# Patient Record
Sex: MALE | Race: OTHER | HISPANIC OR LATINO | URBAN - METROPOLITAN AREA
[De-identification: names, ages, dates, MRNs, and addresses within clinical notes are randomized per-mention and may not be internally consistent; named-entity substitution may affect disease eponyms.]

---

## 2019-09-30 ENCOUNTER — EMERGENCY (EMERGENCY)
Facility: HOSPITAL | Age: 49
LOS: 1 days | Discharge: HOME | End: 2019-09-30
Attending: INTERNAL MEDICINE | Admitting: EMERGENCY MEDICINE
Payer: MEDICAID

## 2019-09-30 VITALS
TEMPERATURE: 98 F | RESPIRATION RATE: 16 BRPM | HEART RATE: 67 BPM | OXYGEN SATURATION: 100 % | DIASTOLIC BLOOD PRESSURE: 94 MMHG | SYSTOLIC BLOOD PRESSURE: 159 MMHG

## 2019-09-30 LAB
ALBUMIN SERPL ELPH-MCNC: 4.6 G/DL — SIGNIFICANT CHANGE UP (ref 3.3–5)
ALP SERPL-CCNC: 97 U/L — SIGNIFICANT CHANGE UP (ref 40–120)
ALT FLD-CCNC: 32 U/L — SIGNIFICANT CHANGE UP (ref 4–41)
ANION GAP SERPL CALC-SCNC: 14 MMO/L — SIGNIFICANT CHANGE UP (ref 7–14)
APTT BLD: 29.8 SEC — SIGNIFICANT CHANGE UP (ref 27.5–36.3)
AST SERPL-CCNC: 19 U/L — SIGNIFICANT CHANGE UP (ref 4–40)
BILIRUB SERPL-MCNC: < 0.2 MG/DL — LOW (ref 0.2–1.2)
BUN SERPL-MCNC: 28 MG/DL — HIGH (ref 7–23)
CALCIUM SERPL-MCNC: 10.1 MG/DL — SIGNIFICANT CHANGE UP (ref 8.4–10.5)
CHLORIDE SERPL-SCNC: 98 MMOL/L — SIGNIFICANT CHANGE UP (ref 98–107)
CO2 SERPL-SCNC: 24 MMOL/L — SIGNIFICANT CHANGE UP (ref 22–31)
CREAT SERPL-MCNC: 0.82 MG/DL — SIGNIFICANT CHANGE UP (ref 0.5–1.3)
D DIMER BLD IA.RAPID-MCNC: 155 NG/ML — SIGNIFICANT CHANGE UP
GLUCOSE SERPL-MCNC: 354 MG/DL — HIGH (ref 70–99)
HBA1C BLD-MCNC: 12.5 % — HIGH (ref 4–5.6)
HCT VFR BLD CALC: 38.9 % — LOW (ref 39–50)
HGB BLD-MCNC: 13 G/DL — SIGNIFICANT CHANGE UP (ref 13–17)
INR BLD: 0.85 — LOW (ref 0.88–1.17)
MCHC RBC-ENTMCNC: 29.1 PG — SIGNIFICANT CHANGE UP (ref 27–34)
MCHC RBC-ENTMCNC: 33.4 % — SIGNIFICANT CHANGE UP (ref 32–36)
MCV RBC AUTO: 87 FL — SIGNIFICANT CHANGE UP (ref 80–100)
NRBC # FLD: 0 K/UL — SIGNIFICANT CHANGE UP (ref 0–0)
PLATELET # BLD AUTO: 233 K/UL — SIGNIFICANT CHANGE UP (ref 150–400)
PMV BLD: 10.4 FL — SIGNIFICANT CHANGE UP (ref 7–13)
POTASSIUM SERPL-MCNC: 4.8 MMOL/L — SIGNIFICANT CHANGE UP (ref 3.5–5.3)
POTASSIUM SERPL-SCNC: 4.8 MMOL/L — SIGNIFICANT CHANGE UP (ref 3.5–5.3)
PROT SERPL-MCNC: 7.5 G/DL — SIGNIFICANT CHANGE UP (ref 6–8.3)
PROTHROM AB SERPL-ACNC: 9.6 SEC — LOW (ref 9.8–13.1)
RBC # BLD: 4.47 M/UL — SIGNIFICANT CHANGE UP (ref 4.2–5.8)
RBC # FLD: 11.4 % — SIGNIFICANT CHANGE UP (ref 10.3–14.5)
SODIUM SERPL-SCNC: 136 MMOL/L — SIGNIFICANT CHANGE UP (ref 135–145)
TROPONIN T, HIGH SENSITIVITY: 27 NG/L — SIGNIFICANT CHANGE UP (ref ?–14)
TROPONIN T, HIGH SENSITIVITY: 27 NG/L — SIGNIFICANT CHANGE UP (ref ?–14)
WBC # BLD: 7.69 K/UL — SIGNIFICANT CHANGE UP (ref 3.8–10.5)
WBC # FLD AUTO: 7.69 K/UL — SIGNIFICANT CHANGE UP (ref 3.8–10.5)

## 2019-09-30 PROCEDURE — 93010 ELECTROCARDIOGRAM REPORT: CPT | Mod: 59

## 2019-09-30 PROCEDURE — 99218: CPT | Mod: 25

## 2019-09-30 PROCEDURE — 71046 X-RAY EXAM CHEST 2 VIEWS: CPT | Mod: 26

## 2019-09-30 RX ORDER — ASPIRIN/CALCIUM CARB/MAGNESIUM 324 MG
162 TABLET ORAL ONCE
Refills: 0 | Status: COMPLETED | OUTPATIENT
Start: 2019-09-30 | End: 2019-10-01

## 2019-09-30 RX ORDER — SODIUM CHLORIDE 9 MG/ML
1000 INJECTION INTRAMUSCULAR; INTRAVENOUS; SUBCUTANEOUS ONCE
Refills: 0 | Status: COMPLETED | OUTPATIENT
Start: 2019-09-30 | End: 2019-09-30

## 2019-09-30 RX ADMIN — SODIUM CHLORIDE 1000 MILLILITER(S): 9 INJECTION INTRAMUSCULAR; INTRAVENOUS; SUBCUTANEOUS at 23:42

## 2019-09-30 NOTE — ED CDU PROVIDER INITIAL DAY NOTE - OBJECTIVE STATEMENT
49 year old male with a PMHx of type 2 diabetes sent in by PCP for EKG changes. Pt endorses that he has had waxing/waning left sided shoulder/chest pain for ~2 days. Pain is pressure in nature, not reproducible. Denies n/v/f/c, diaphoresis, lightheadedness, SOB, abdominal pain, cough, dysuria, hematuria, melena, hematochezia, recent travel, sick contacts, travel, LE edema, calf pain, hx of tobacco use.   In the ED: 49 year old male with a PMHx of type 2 diabetes sent in by PCP (Dr. Bubba Campoverde) for EKG changes. Pt endorses that he has had waxing/waning left sided shoulder/chest pain for ~2 days. Pain is pressure in nature, not reproducible. Denies n/v/f/c, diaphoresis, lightheadedness, SOB, abdominal pain, cough, dysuria, hematuria, melena, hematochezia, recent travel, sick contacts, travel, LE edema, calf pain, hx of tobacco use.   In the ED: EKG showed 1 mm ST elevation in V1 and V2(concave-up in V2), no ST depressions, T-wave inversions in II, III, aVF, sent pt to the CDU for stress test and tele monitor.

## 2019-09-30 NOTE — ED CDU PROVIDER INITIAL DAY NOTE - MEDICAL DECISION MAKING DETAILS
49 year old male with a PMHx of type 2 diabetes sent in by PCP (Dr. Bubba Campoverde) for EKG changes.  Plan: stress test, tele monitor

## 2019-09-30 NOTE — ED CDU PROVIDER INITIAL DAY NOTE - ATTENDING CONTRIBUTION TO CARE
I Alvin Orlando MD have personally performed a face to face diagnostic evaluation on this patient.  I have reviewed the ACP note and agree with the history, exam, and plan of care, except as noted.   My medical decision making and observations are found above.  The patient is stable for CDU.    Awake, Alert, Conversant.  Resting comfortably.  Breath sounds clear in all lung fields.  Normal and regular heart rate without murmurs, rubs, or gallops.  Normal S1/S2.  Abdomen soft and nontender.  No lower extremity swelling or tenderness.  Oriented and conversant with fluent speech, moving all extremities with good strength.    Plan for serial troponins, aspirin, AM stress and cardiology consult

## 2019-09-30 NOTE — ED PROVIDER NOTE - ST/T WAVE
No ST elevations or Depressions, No T-wave inversions. 1 mm ST elevation in V1 and V2(concave-up in V2), no ST depressions, T-wave inversions in II, III, aVF

## 2019-09-30 NOTE — ED PROVIDER NOTE - CARDIAC, MLM
Normal rate, regular rhythm.  Heart sounds S1, S2.  No murmurs, rubs or gallops. Radial pulses 2+ Normal rate, regular rhythm.  Heart sounds S1, S2.  No murmurs, rubs or gallops. Radial pulses 2+ B/L

## 2019-09-30 NOTE — ED PROVIDER NOTE - CLINICAL SUMMARY MEDICAL DECISION MAKING FREE TEXT BOX
48 y/o male with history of diabetes sent in for atypical chest pain with EKG abnormalities not seen on EKG performed at triage at the ED. Currently comfortable. Will r/o ACS. Low doubt for bacterial infection such as pneumonia because there is no fever, cough, or chills. Low risk PE. Appropriate to rule out with d-dimer Doubt aortic dissection given no pain radiation to back. Pt nontoxic on exam and not hypertensive. Radial pulses 2+ on exam. 50 y/o male with history of diabetes sent in for atypical chest pain with EKG abnormalities. EKG in triage does not meet STEMI criteria.  Currently comfortable. Will r/o ACS. Low suspicion for pneumonia because there is no fever, cough, or chills. Low risk for PE and appropriate to rule out with d-dimer. Doubt aortic dissection given no pain radiation to back, Patient is nontoxic on exam and not hypertensive..

## 2019-09-30 NOTE — ED CDU PROVIDER INITIAL DAY NOTE - PROGRESS NOTE DETAILS
Emailed endocrine for consult - for hyperglycemia and A1c of 12.5. Will start pt pn weight based insulin. Emailed endocrine for consult - for hyperglycemia and A1c of 12.5. Will start pt on weight based insulin. Pt refusing insulin - discussed only giving insulin for hospital stay and discussing alternate options with endocrinologist - pt refusing insulin and would prefer to speak with endocrine tomorrow regarding alternate diabetes regimen.

## 2019-09-30 NOTE — ED ADULT NURSE REASSESSMENT NOTE - NS ED NURSE REASSESS COMMENT FT1
pt stable and comfortable denies n/v d cp sob, nsr on monitor, second trop sent to lab, will monitor

## 2019-09-30 NOTE — ED ADULT NURSE NOTE - OBJECTIVE STATEMENT
received pt A&Ox3 in no apparent distress at this time. #20g IVL to L AC, bloods drawn and sent to the lab. no s/s of infiltration noted at this time. family and MD at bedside. vss. dispo pending

## 2019-09-30 NOTE — ED PROVIDER NOTE - OBJECTIVE STATEMENT
48 y/o male with significant PMHx of type 2 diabetes presents to the ED with left sided chest discomfort described as pressure radiating to left arm constant since yesterday. No clear provoking factors. The patient was sent to the ED by his PMD due to EKG abnormalities. The EKG had 1mm ST elevation in view 1 and 2 without T-wave inversions or ST depressions. Patient denies SOB, diaphoresis, bloody or melenic stool, fevers, chills, cough, N/V/D, recent travel, sick contacts, or any other medical problems. 50 y/o male with significant PMHx of type 2 diabetes presents to the ED with left sided chest discomfort described as pressure radiating to left arm constant since yesterday. No clear provoking factors. The patient was sent to the ED by his PMD due to EKG abnormalities. The EKG had 1mm ST elevation in view 1 and 2 without T-wave inversions or ST depressions. Patient denies SOB, diaphoresis, bloody or melenic stool, fevers, chills, cough, N/V/D, recent travel, sick contacts, or any other medical problems.  No family hx of MI  Denies tobacco use 48 y/o male with significant PMHx of type 2 diabetes presents to the ED with left sided chest discomfort described as pressure radiating to left arm constant since yesterday. No clear provoking factors. The patient was sent to the ED by his PMD due to EKG abnormalities.  Patient denies SOB, diaphoresis, bloody or melenic stool, fevers, chills, cough, N/V/D, recent travel, sick contacts, or any other medical problems.  No family hx of MI  Denies tobacco use

## 2019-09-30 NOTE — ED ADULT TRIAGE NOTE - CHIEF COMPLAINT QUOTE
C/o left shoulder/arm and chest pain since yesterday. Seen at pmd today and told to come to ED for abnormal ekg. Denies sob or dizziness. H/o DM.

## 2019-10-01 VITALS
SYSTOLIC BLOOD PRESSURE: 161 MMHG | HEART RATE: 75 BPM | DIASTOLIC BLOOD PRESSURE: 74 MMHG | RESPIRATION RATE: 16 BRPM | OXYGEN SATURATION: 100 % | TEMPERATURE: 98 F

## 2019-10-01 DIAGNOSIS — I10 ESSENTIAL (PRIMARY) HYPERTENSION: ICD-10-CM

## 2019-10-01 DIAGNOSIS — E78.49 OTHER HYPERLIPIDEMIA: ICD-10-CM

## 2019-10-01 DIAGNOSIS — E11.65 TYPE 2 DIABETES MELLITUS WITH HYPERGLYCEMIA: ICD-10-CM

## 2019-10-01 PROCEDURE — 78452 HT MUSCLE IMAGE SPECT MULT: CPT | Mod: 26

## 2019-10-01 PROCEDURE — 99284 EMERGENCY DEPT VISIT MOD MDM: CPT | Mod: GC

## 2019-10-01 PROCEDURE — 93018 CV STRESS TEST I&R ONLY: CPT | Mod: GC

## 2019-10-01 PROCEDURE — 99217: CPT

## 2019-10-01 PROCEDURE — 93016 CV STRESS TEST SUPVJ ONLY: CPT | Mod: GC

## 2019-10-01 RX ORDER — SITAGLIPTIN AND METFORMIN HYDROCHLORIDE 500; 50 MG/1; MG/1
1 TABLET, FILM COATED ORAL
Qty: 28 | Refills: 0
Start: 2019-10-01 | End: 2019-10-14

## 2019-10-01 RX ORDER — SODIUM CHLORIDE 9 MG/ML
1000 INJECTION INTRAMUSCULAR; INTRAVENOUS; SUBCUTANEOUS ONCE
Refills: 0 | Status: COMPLETED | OUTPATIENT
Start: 2019-10-01 | End: 2019-10-01

## 2019-10-01 RX ORDER — GLIMEPIRIDE 1 MG
1 TABLET ORAL
Qty: 28 | Refills: 0
Start: 2019-10-01 | End: 2019-10-14

## 2019-10-01 RX ADMIN — Medication 162 MILLIGRAM(S): at 07:00

## 2019-10-01 RX ADMIN — SODIUM CHLORIDE 1000 MILLILITER(S): 9 INJECTION INTRAMUSCULAR; INTRAVENOUS; SUBCUTANEOUS at 13:28

## 2019-10-01 NOTE — ED CDU PROVIDER DISPOSITION NOTE - PATIENT PORTAL LINK FT
You can access the FollowMyHealth Patient Portal offered by [facility] by registering at the following website: [email or web address]. By joining [facility]’s FollowMyHealth portal, you will also be able to view your health information using other applications (apps) compatible with our system.

## 2019-10-01 NOTE — CONSULT NOTE ADULT - SUBJECTIVE AND OBJECTIVE BOX
HISTORY OF PRESENT ILLNESS: HPI:    49 year old male with a PMHx of type 2 diabetes sent in by PCP (Dr. Bubba Campoverde) for EKG changes. Pt c/o left shoulder intermittent pain. EKG showed 1 mm ST elevation in V1 and V2(concave-up in V2), no ST depressions, T-wave inversions in II, III, aVF. Cardiology consulted for r/o ACS, Abnormal EKG.       PAST MEDICAL & SURGICAL HISTORY:  Diabetes type 2, controlled  No significant past surgical history    MEDICATIONS:  MEDICATIONS  (STANDING):      Allergies    No Known Allergies    Intolerances        FAMILY HISTORY:    Non-contributary for premature coronary disease or sudden cardiac death    SOCIAL HISTORY:    [X ] Non-smoker  [ ] Smoker  [ ] Alcohol      REVIEW OF SYSTEMS:  [ ]chest pain  [  ]shortness of breath  [  ]palpitations  [  ]syncope  [ ]near syncope [ ]upper extremity weakness   [ ] lower extremity weakness  [  ]diplopia  [  ]altered mental status   [  ]fevers  [ ]chills [ ]nausea  [ ]vomitting  [  ]dysphagia    [ ]abdominal pain  [ ]melena  [ ]BRBPR    [  ]epistaxis  [  ]rash    [ ]lower extremity edema        [X ] All others negative	  [ ] Unable to obtain    LABS:	 	    CARDIAC MARKERS:                          13.0   7.69  )-----------( 233      ( 30 Sep 2019 16:30 )             38.9     Hb Trend: 13.0<--    09-30    136  |  98  |  28<H>  ----------------------------<  354<H>  4.8   |  24  |  0.82    Ca    10.1      30 Sep 2019 18:11    TPro  7.5  /  Alb  4.6  /  TBili  < 0.2<L>  /  DBili  x   /  AST  19  /  ALT  32  /  AlkPhos  97  09-30    Creatinine Trend: 0.82<--    Coags:  PT/INR - ( 30 Sep 2019 16:30 )   PT: 9.6 SEC;   INR: 0.85          PTT - ( 30 Sep 2019 16:30 )  PTT:29.8 SEC    proBNP:   Lipid Profile:   HgA1c: Hemoglobin A1C, Whole Blood: 12.5 % (09-30 @ 16:12)    TSH:       PHYSICAL EXAM:  T(C): 36.8 (10-01-19 @ 10:12), Max: 36.8 (09-30-19 @ 20:13)  HR: 69 (10-01-19 @ 10:12) (64 - 69)  BP: 138/76 (10-01-19 @ 10:12) (138/76 - 159/94)  RR: 16 (10-01-19 @ 10:12) (14 - 18)  SpO2: 100% (10-01-19 @ 10:12) (98% - 100%)  Wt(kg): --  I&O's Summary      Gen: Appears well in NAD  HEENT:  (-)icterus (-)pallor  CV: N S1 S2 1/6 GUI (+)2 Pulses B/l  Resp:  Clear to auscultation B/L, normal effort  GI: (+) BS Soft, NT, ND  Lymph:  (-)Edema, (-)obvious lymphadenopathy  Skin: Warm to touch, Normal turgor  Psych: Appropriate mood and affect      TELEMETRY: 	  NSR     ECG:  	  < from: 12 Lead ECG (09.30.19 @ 15:35) >  Normal sinus rhythm  Voltage criteria for left ventricular hypertrophy  Nonspecific T wave abnormality  Abnormal ECG    < end of copied text >      RADIOLOGY:         CXR:   < from: Xray Chest 2 Views PA/Lat (09.30.19 @ 17:52) >  IMPRESSION:   Clear lungs.    JAM ARROYO, RADIOLOGY RESIDENT  This document has been electronically signed.  NELA RAMIREZ M.D., ATTENDING RADIOLOGIST    < end of copied text >    ASSESSMENT/PLAN: 	    49 year old male with a PMHx of type 2 diabetes sent in by PCP (Dr. Bubba Campoverde) for EKG changes. Pt c/o left shoulder intermittent pain. EKG showed 1 mm ST elevation in V1 and V2(concave-up in V2), no ST depressions, T-wave inversions in II, III, aVF. Cardiology consulted for r/o ACS, Abnormal EKG.     -- no cp, or sob   -- trop hs 27 --> 27, indeterminant  -- pending NST results  -- if NST normal recommend oupt cardiac work up   --on d/c pt to follow with Dr. Sullivan  10/7/19 10:15 am HISTORY OF PRESENT ILLNESS: HPI:    49 year old male with a PMHx of type 2 diabetes sent in by PCP (Dr. Bubba Campoverde) for EKG changes. Pt c/o left shoulder intermittent pain. EKG showed 1 mm ST elevation in V1 and V2(concave-up in V2), no ST depressions, T-wave inversions in II, III, aVF. Cardiology consulted for r/o ACS, Abnormal EKG.  Pt. denies chest pain.  His left shoulder pain occurred at rest and was not worsened with exertion.  The patient denies any exertional symptoms in the past.       PAST MEDICAL & SURGICAL HISTORY:  Diabetes type 2, controlled  No significant past surgical history    MEDICATIONS:  MEDICATIONS  (STANDING):      Allergies    No Known Allergies    Intolerances        FAMILY HISTORY:    Non-contributary for premature coronary disease or sudden cardiac death    SOCIAL HISTORY:    [X ] Non-smoker  [ ] Smoker  [ ] Alcohol      REVIEW OF SYSTEMS:  [ ]chest pain  [  ]shortness of breath  [  ]palpitations  [  ]syncope  [ ]near syncope [ ]upper extremity weakness   [ ] lower extremity weakness  [  ]diplopia  [  ]altered mental status   [  ]fevers  [ ]chills [ ]nausea  [ ]vomitting  [  ]dysphagia    [ ]abdominal pain  [ ]melena  [ ]BRBPR    [  ]epistaxis  [  ]rash    [ ]lower extremity edema        [X ] All others negative	  [ ] Unable to obtain    LABS:	 	    CARDIAC MARKERS:                          13.0   7.69  )-----------( 233      ( 30 Sep 2019 16:30 )             38.9     Hb Trend: 13.0<--    09-30    136  |  98  |  28<H>  ----------------------------<  354<H>  4.8   |  24  |  0.82    Ca    10.1      30 Sep 2019 18:11    TPro  7.5  /  Alb  4.6  /  TBili  < 0.2<L>  /  DBili  x   /  AST  19  /  ALT  32  /  AlkPhos  97  09-30    Creatinine Trend: 0.82<--    Coags:  PT/INR - ( 30 Sep 2019 16:30 )   PT: 9.6 SEC;   INR: 0.85          PTT - ( 30 Sep 2019 16:30 )  PTT:29.8 SEC    proBNP:   Lipid Profile:   HgA1c: Hemoglobin A1C, Whole Blood: 12.5 % (09-30 @ 16:12)    TSH:       PHYSICAL EXAM:  T(C): 36.8 (10-01-19 @ 10:12), Max: 36.8 (09-30-19 @ 20:13)  HR: 69 (10-01-19 @ 10:12) (64 - 69)  BP: 138/76 (10-01-19 @ 10:12) (138/76 - 159/94)  RR: 16 (10-01-19 @ 10:12) (14 - 18)  SpO2: 100% (10-01-19 @ 10:12) (98% - 100%)  Wt(kg): --  I&O's Summary      Gen: Appears well in NAD  HEENT:  (-)icterus (-)pallor  CV: N S1 S2 1/6 GUI (+)2 Pulses B/l  Resp:  Clear to auscultation B/L, normal effort  GI: (+) BS Soft, NT, ND  Lymph:  (-)Edema, (-)obvious lymphadenopathy  Skin: Warm to touch, Normal turgor  Psych: Appropriate mood and affect      TELEMETRY: 	  NSR     ECG:  	  < from: 12 Lead ECG (09.30.19 @ 15:35) >  Normal sinus rhythm  Voltage criteria for left ventricular hypertrophy  Nonspecific T wave abnormality  Abnormal ECG    < end of copied text >      RADIOLOGY:         CXR:   < from: Xray Chest 2 Views PA/Lat (09.30.19 @ 17:52) >  IMPRESSION:   Clear lungs.    JAM ARROYO, RADIOLOGY RESIDENT  This document has been electronically signed.  NELA RAMIREZ M.D., ATTENDING RADIOLOGIST    < end of copied text >    ASSESSMENT/PLAN: 	    49 year old male with a PMHx of type 2 diabetes sent in by PCP (Dr. Bubba Campoverde) for EKG changes. Pt c/o left shoulder intermittent pain. EKG showed 1 mm ST elevation in V1 and V2(concave-up in V2), no ST depressions, T-wave inversions in II, III, aVF. Cardiology consulted for r/o ACS, Abnormal EKG.     -- no cp, or sob   -- trop hs 27 --> 27, indeterminant  -- pending NST results  -- if NST normal recommend oupt cardiac work up   --on d/c pt to follow with Dr. Sullivan  10/7/19 10:15 am

## 2019-10-01 NOTE — ED CDU PROVIDER SUBSEQUENT DAY NOTE - HISTORY
49 year old male with a PMHx of type 2 diabetes sent in by PCP (Dr. Bubba Campoverde) for EKG changes. EKG showed 1 mm ST elevation in V1 and V2(concave-up in V2), no ST depressions, T-wave inversions in II, III, aVF, sent pt to the CDU for stress test and tele monitor. 49 year old male with a PMHx of type 2 diabetes sent in by PCP (Dr. Bubba Campoverde) for EKG changes. EKG showed 1 mm ST elevation in V1 and V2(concave-up in V2), no ST depressions, T-wave inversions in II, III, aVF, sent pt to the CDU for stress test and tele monitor. Found to have A1c of 12.5 - endocrine consulted.

## 2019-10-01 NOTE — CONSULT NOTE ADULT - ASSESSMENT
Patient is a 49 year old male admitted to the hospital with left shoulder pain and EKG changes. Endocrine consulted for uncontrolled Type 2 DM with a Hemoglobin A1C of 12.5%. Patient reports following his PCP Dr. Campoverde for his DM, denies following an Endocrinologist. Patient currently on Januvia 1000 mg PO BID and currently refusing insulin inpatient. Blood glucose ranges > 200 currently.    1. Type 2 DM  - Blood glucose goal 100-180 mg/dl  - RD consult      2. Hypertension  - SBP running 138-153 in patient  - Recommend starting an ACE for renal protection    3. R/O Hyperlipidemia  - Send lipid profile Patient is a 49 year old male admitted to the hospital with left shoulder pain and EKG changes. Endocrine consulted for uncontrolled Type 2 DM with a Hemoglobin A1C of 12.5%. Patient does not follow an endocrinologist for DM management. Patient currently on Januvia at home (dose unknown) and currently refusing insulin inpatient. Blood glucose ranges > 200 currently.    1. Type 2 DM  - Blood glucose goal 100-180 mg/dl  - Recommend RD consult  - Recommend checking FS TID AC and QHS  - Recommend Lantus 12 units QHS, Humalog 3 units TID AC, Humalog low dose correctional scale TID AC, and Humalog low dose correctional scale QHS  - Patient refusing insulin, discussed at length patient risks with Hemoglobin A1C of 12.5%  - Check c-peptide with FS at same time, as well as SHEKHAR and islet cell antibodies    Outpatient plan:  - Ideally, would recommend Basal insulin (per insurance)- however patient is refusing insulin. Will then recommend to maximize oral agents  - Recommend checking FS TID at home  - If patient continues to refuse insulin, will then recommend to maximize oral agents  - Recommend keeping patient on home Januvia dosing as well as adding Glimepiride 2mg PO daily  - Follow up at office: Endocrinology at [place] @ [address], [address]      2. Hypertension  - SBP running 138-153 in patient  - Recommend starting an ACE for renal protection    3. R/O Hyperlipidemia  - Send lipid profile  - Recommend starting low dose statin Patient is a 49 year old male admitted to the hospital with left shoulder pain and EKG changes. Endocrine consulted for uncontrolled Type 2 DM with a Hemoglobin A1C of 12.5%. Patient does not follow an endocrinologist for DM management. Patient currently on Janumet at home and currently refusing insulin inpatient. Blood glucose ranges > 200 currently.    1. Type 2 DM  - Blood glucose goal 100-180 mg/dl  - Recommend RD consult  - Recommend checking FS TID AC and QHS  - Recommend Lantus 12 units QHS, Humalog 3 units TID AC, Humalog low dose correctional scale TID AC, and Humalog low dose correctional scale QHS  - Patient refusing insulin, discussed at length patient risks with Hemoglobin A1C of 12.5%  - Check c-peptide with FS at same time, as well as SHEKHAR and islet cell antibodies in AM or outpatient    Outpatient plan:  - Ideally, would recommend Basal insulin (per insurance)- however patient is refusing insulin. Will then recommend to maximize oral agents  - Recommend checking FS TID at home for goal FS .   - If patient continues to refuse insulin, will then recommend to maximize oral agents  - Recommend keeping patient on home Janumet 50/1000 mg BID and Glimepiride 2 mg BID  - Follow up at office: Endocrinology at [place] @ [address], [address]    2. Hypertension  - SBP running 138-153 in patient  - Recommend starting an ACE low dose, Lisinopril 2.5 mg QD  - Patient refusing any additional medications as he feels overwhelmed with "too many pills"    3. R/O Hyperlipidemia  - Send lipid profile  - Recommend starting low dose statin  - Patient refusing any additional medications as he feels overwhelmed with "too many pills"

## 2019-10-01 NOTE — ED CDU PROVIDER SUBSEQUENT DAY NOTE - MEDICAL DECISION MAKING DETAILS
49 year old male with a PMHx of type 2 diabetes sent in by PCP (Dr. Bubba Campoverde) for EKG changes. Plan: stress test and tele monitor. 49 year old male with a PMHx of type 2 diabetes sent in by PCP (Dr. Bubba Campoverde) for EKG changes. Plan: stress test and tele monitor. Endocrine consult for A1c of 12.5.

## 2019-10-01 NOTE — ED CDU PROVIDER SUBSEQUENT DAY NOTE - ATTENDING CONTRIBUTION TO CARE
I performed a face to face bedside interview with patient regarding history of present illness, review of symptoms and past medical history. I completed an independent physical exam.  I have discussed patient's plan of care.   I agree with note as stated above, having amended the EMR as needed to reflect my findings. I have discussed the assessment and plan of care.  This includes during the time I functioned as the attending physician for this patient.  Attending Contribution to Care: agree with plan of pa. 49 year old male with a PMHx of type 2 diabetes sent in by PCP (Dr. Bubba Campoverde) for EKG changes. EKG showed 1 mm ST elevation in V1 and V2(concave-up in V2), no ST depressions, T-wave inversions in II, III, aVF, sent pt to the CDU for stress test and tele monitor. Found to have A1c of 12.5 - endocrine consulted.  pt stress test non ischemic, pt asymptomatic with no acute complaints. eval by endocrine for elevated hga1c, vss. ekg with no sig changes. pt stable for d/c.

## 2019-10-01 NOTE — CONSULT NOTE ADULT - SUBJECTIVE AND OBJECTIVE BOX
Reason For Consult: Hemoglobin A1C 12.5%    HPI:  Patient is a 49-year-old male admitted to the hospital with left sided shoulder pain and EKG changes. Endocrine consulted for uncontrolled DMT2 with a hemoglobin A1C of 12.5%. Patient states he has been diagnosed with Type 2 DM for over twenty years. Patient states he has a TrueTrack glucometer and reports checking his blood glucose levels once daily in the morning. Per patient, average AM fasting blood glucose level is "around 200".  Patient average meals during the day for the past 5 months includes: at 8 am 4 hard boiled eggs, at 10 am a handful of cashews, at 12 pm a piece of broiled salmon, at 2 pm one pomegranate, at 4 pm a handful of almonds and at 6pm another piece of fish. Patient denies having any other snacks throughout the day,  "some times" he will make a smoothie with "green vegetables" but otherwise he states he only drinks water throughout the day. Patient denies any symptoms of polyphagia, polyuria, polydipsia, change in vision, neuropathy, weight gain/loss, heat/cold intolerances or N/V/D/C.    PAST MEDICAL & SURGICAL HISTORY:  Diabetes type 2, controlled  No significant past surgical history      FAMILY HISTORY: Denies any significant history      Social History: Denies any tobacco, alcohol or illicit drug use.    Outpatient Medications:   Januvia 1000 MG PO BID    MEDICATIONS  (STANDING): One time dose of Aspirin 162 mg PO    MEDICATIONS  (PRN): None      Allergies    No Known Allergies    Intolerances      Review of Systems:  Constitutional: No fever  Eyes: No blurry vision  HEENT: No pain  Cardiovascular: No chest pain, palpitations  Respiratory: No SOB, no cough  GI: No nausea, vomiting, abdominal pain  : No dysuria  Endocrine: no polyuria, polydipsia    ALL OTHER SYSTEMS REVIEWED AND NEGATIVE      PHYSICAL EXAM:  VITALS: T(C): 36.8 (10-01-19 @ 10:12)  T(F): 98.3 (10-01-19 @ 10:12), Max: 98.3 (09-30-19 @ 20:13)  HR: 69 (10-01-19 @ 10:12) (64 - 69)  BP: 138/76 (10-01-19 @ 10:12) (138/76 - 159/94)  RR:  (14 - 18)  SpO2:  (98% - 100%)  Wt(kg): --  GENERAL: NAD, well-groomed, well-developed  EYES: No proptosis, no lid lag, anicteric  HEENT:  Atraumatic, Normocephalic, moist mucous membranes  THYROID: Normal size, no palpable nodules  RESPIRATORY: Clear to auscultation bilaterally; No rales, rhonchi, wheezing, or rubs  CARDIOVASCULAR: Regular rate and rhythm; No murmurs; no peripheral edema  GI: Soft, nontender, non distended, normal bowel sounds  PSYCH: Alert and oriented x 3, normal affect, normal mood      POCT Blood Glucose.: 274 mg/dL (10-01-19 @ 09:35)  POCT Blood Glucose.: 227 mg/dL (09-30-19 @ 22:40)                            13.0   7.69  )-----------( 233      ( 30 Sep 2019 16:30 )             38.9       09-30    136  |  98  |  28<H>  ----------------------------<  354<H>  4.8   |  24  |  0.82    EGFR if : 122  EGFR if non : 105    Ca    10.1      09-30    TPro  7.5  /  Alb  4.6  /  TBili  < 0.2<L>  /  DBili  x   /  AST  19  /  ALT  32  /  AlkPhos  97  09-30        Hemoglobin A1C, Whole Blood: 12.5 % <H> [4.0 - 5.6] (09-30-19 @ 16:12) Reason For Consult: Hemoglobin A1C 12.5%    HPI:  Patient is a 49-year-old male admitted to the hospital with left sided shoulder pain and EKG changes. Endocrine consulted for uncontrolled DMT2 with a hemoglobin A1C of 12.5%. Patient states he has been diagnosed with Type 2 DM for over twenty years. Patient states he has a TrueTrack glucometer and reports checking his blood glucose levels once daily in the morning. Per patient, average AM fasting blood glucose level is "around 200".  Patient reports following-up with his PCP approximately 8 months ago and his Hemoglobin A1C was "around 12". Patient average meals during the day for the past 5 months includes: at 8 am 4 hard boiled eggs, at 10 am a handful of cashews, at 12 pm a piece of broiled salmon, at 2 pm one pomegranate, at 4 pm a handful of almonds and at 6pm another piece of fish. Patient denies having any other snacks throughout the day,  "some times" he will make a smoothie with "green vegetables" but otherwise he states he only drinks water throughout the day. Patient denies following an endocrinologist- his PCP manages his DM. Denies following an opthalmologist or podiatrist. Patient denies any symptoms of polyphagia, polyuria, polydipsia, change in vision, neuropathy, weight gain/loss, heat/cold intolerances or N/V/D/C.    PAST MEDICAL & SURGICAL HISTORY:  Diabetes type 2, controlled  No significant past surgical history      FAMILY HISTORY: Denies any significant history      Social History: Denies any tobacco, alcohol or illicit drug use.    Outpatient Medications:   Januvia- dose unknown    MEDICATIONS  (STANDING): One time dose of Aspirin 162 mg PO    MEDICATIONS  (PRN): None      Allergies    No Known Allergies    Intolerances      Review of Systems:  Constitutional: No fever  Eyes: No blurry vision  HEENT: No pain  Cardiovascular: No chest pain, palpitations  Respiratory: No SOB, no cough  GI: No nausea, vomiting, abdominal pain  : No dysuria  Endocrine: no polyuria, polydipsia    ALL OTHER SYSTEMS REVIEWED AND NEGATIVE      PHYSICAL EXAM:  VITALS: T(C): 36.8 (10-01-19 @ 10:12)  T(F): 98.3 (10-01-19 @ 10:12), Max: 98.3 (09-30-19 @ 20:13)  HR: 69 (10-01-19 @ 10:12) (64 - 69)  BP: 138/76 (10-01-19 @ 10:12) (138/76 - 159/94)  RR:  (14 - 18)  SpO2:  (98% - 100%)  Wt(kg): --  GENERAL: NAD, well-groomed, well-developed, thin  EYES: No proptosis  HEENT:  Atraumatic, Normocephalic, moist mucous membranes  THYROID: Normal size, no palpable nodules  RESPIRATORY: Clear to auscultation bilaterally; No rales, rhonchi, wheezing, or rubs  CARDIOVASCULAR: Regular rate and rhythm; No murmurs; no peripheral edema  GI: Soft, nontender, non distended, normal bowel sounds  PSYCH: Alert and oriented x 3, normal affect, normal mood      POCT Blood Glucose.: 274 mg/dL (10-01-19 @ 09:35)  POCT Blood Glucose.: 227 mg/dL (09-30-19 @ 22:40)                            13.0   7.69  )-----------( 233      ( 30 Sep 2019 16:30 )             38.9       09-30    136  |  98  |  28<H>  ----------------------------<  354<H>  4.8   |  24  |  0.82    EGFR if : 122  EGFR if non : 105    Ca    10.1      09-30    TPro  7.5  /  Alb  4.6  /  TBili  < 0.2<L>  /  DBili  x   /  AST  19  /  ALT  32  /  AlkPhos  97  09-30        Hemoglobin A1C, Whole Blood: 12.5 % <H> [4.0 - 5.6] (09-30-19 @ 16:12) Reason For Consult: Hemoglobin A1C 12.5%    HPI: Patient is a 49-year-old male admitted to the hospital with left sided shoulder pain and EKG changes. Endocrine consulted for uncontrolled DMT2 with a hemoglobin A1C of 12.5%. Patient states he has been diagnosed with Type 2 DM for over twenty years. Patient states he has a TrueTrack glucometer and reports checking his blood glucose levels once daily in the morning. Per patient, average AM fasting blood glucose level is "around 200".  Patient reports following-up with his PCP approximately 8 months ago and his Hemoglobin A1C was "around 12". Patient average meals during the day for the past 5 months includes: at 8 am 4 hard boiled eggs, at 10 am a handful of cashews, at 12 pm a piece of broiled salmon, at 2 pm one pomegranate, at 4 pm a handful of almonds and at 6pm another piece of fish. Patient denies having any other snacks throughout the day,  "some times" he will make a smoothie with "green vegetables" but otherwise he states he only drinks water throughout the day. Patient denies following an endocrinologist- his PCP manages his DM. Denies following an opthalmologist or podiatrist. Patient denies any symptoms of polyphagia, polyuria, polydipsia, change in vision, neuropathy, weight gain/loss, heat/cold intolerances or N/V/D/C.    Patient persistently refuses all insulin to CDU providers.    PAST MEDICAL & SURGICAL HISTORY:  Diabetes type 2, controlled  No significant past surgical history      FAMILY HISTORY: Denies any significant history of T2DM or autoimmune disease    Social History: Denies any tobacco, alcohol or illicit drug use. Admits to having kids.    Outpatient Medications:   Janumet 50/1000 mg BID as per patient, last filled in Dec 2018 as per pharmacy.    MEDICATIONS  (STANDING): One time dose of Aspirin 162 mg PO    MEDICATIONS  (PRN): None      Allergies  No Known Allergies    Review of Systems:  Constitutional: No fever  Eyes: No blurry vision  HEENT: No pain  Cardiovascular: No chest pain, palpitations  Respiratory: No SOB, no cough  GI: No nausea, vomiting, abdominal pain  : No dysuria  Endocrine: no polyuria, polydipsia    ALL OTHER SYSTEMS REVIEWED AND NEGATIVE      PHYSICAL EXAM:  VITALS: T(C): 36.8 (10-01-19 @ 10:12)  T(F): 98.3 (10-01-19 @ 10:12), Max: 98.3 (09-30-19 @ 20:13)  HR: 69 (10-01-19 @ 10:12) (64 - 69)  BP: 138/76 (10-01-19 @ 10:12) (138/76 - 159/94)  RR:  (14 - 18)  SpO2:  (98% - 100%)  Wt(kg): 70 kg    GENERAL: NAD, well-groomed, well-developed, normal weight, muscular male  EYES: No proptosis  HEENT:  Atraumatic, Normocephalic, moist mucous membranes  THYROID: Normal size, no palpable nodules  RESPIRATORY: Clear to auscultation bilaterally; No rales, rhonchi, wheezing, or rubs  CARDIOVASCULAR: Regular rate and rhythm; No murmurs; no peripheral edema  GI: Soft, nontender, non distended, normal bowel sounds  PSYCH: Alert and oriented x 3, normal affect, normal mood      POCT Blood Glucose.: 274 mg/dL (10-01-19 @ 09:35)  POCT Blood Glucose.: 227 mg/dL (09-30-19 @ 22:40)                            13.0   7.69  )-----------( 233      ( 30 Sep 2019 16:30 )             38.9       09-30    136  |  98  |  28<H>  ----------------------------<  354<H>  4.8   |  24  |  0.82    EGFR if : 122  EGFR if non : 105    Ca    10.1      09-30    TPro  7.5  /  Alb  4.6  /  TBili  < 0.2<L>  /  DBili  x   /  AST  19  /  ALT  32  /  AlkPhos  97  09-30        Hemoglobin A1C, Whole Blood: 12.5 % <H> [4.0 - 5.6] (09-30-19 @ 16:12)

## 2019-10-01 NOTE — ED CDU PROVIDER SUBSEQUENT DAY NOTE - PROGRESS NOTE DETAILS
TENZIN Griffith: pt NAD, VSS, no acute complaints. Pt resting comfortably. No events on tele. Will continue to monitor. Pending stress test. Pt has been resting comfortably. Seen by cardiology Dr. Angeles, if stress negative dc home with outpatient follow up. Stress normal study. Also seen by Endocrinology for elevated A1c. Pt has been refusing insulin in the hospital and at [facility].  understands the risks of untreated elevated sugar levels. Seen by Endocrinology who recommend melissa discharge Janumet 50/1000 BID and Glimeperide 2 mg BID. Pt to followup with PMD/Cards/ Endo outpatient. strict return precautions given, ready for dc. Pt has been resting comfortably. Seen by cardiology Dr. Angeles, if stress negative dc home with outpatient follow up. Stress normal study. Also seen by Endocrinology for elevated A1c. Pt has been refusing insulin in the hospital and at home.  understands the risks of untreated elevated sugar levels. Seen by Endocrinology who recommend for discharge Janumet 50/1000 BID and Glimeperide 2 mg BID. finger stick BID, pt has glucometer and test strips. additional labs of requested by Endo sent off ( does not have to wait fo results) left message with PA admin to call patient with results. Pt to followup with PMD/Cards/ Endo outpatient. strict return precautions given, ready for dc.

## 2019-10-01 NOTE — ED CDU PROVIDER DISPOSITION NOTE - NSFOLLOWUPINSTRUCTIONS_ED_ALL_ED_FT
Take Janumet twice day  Take Glimeperide twice a day   Check your Finger Stick twice a day as discussed  Follow up with your primary care physician in 48-72 hours- bring copies of your results.    Follow up with Endocrinology at [place] @ [address], [address] [phone]  Follow up with Cardiologist Dr. Sullivan 10/7/19 at 10:15 am.      Return to the Emergency Department for worsening or persistent symptoms OR ANY NEW OR CONCERNING SYMPTOMS. Take Janumet twice day  Take Glimeperide twice a day   Check your Finger Stick twice a day as discussed  Follow up with your primary care physician in 48-72 hours- bring copies of your results.    Follow up with Endocrinology at [place] @ [address], [address] [phone]  Follow up with Cardiologist Dr. Sullivan [phone] , on 10/7/19 at 10:15 am.      Return to the Emergency Department for worsening or persistent symptoms OR ANY NEW OR CONCERNING SYMPTOMS.

## 2019-10-01 NOTE — ED CDU PROVIDER DISPOSITION NOTE - CLINICAL COURSE
49 year old male with a PMHx of type 2 diabetes sent in by PCP (Dr. Bubba Campoverde) for EKG changes. EKG showed 1 mm ST elevation in V1 and V2(concave-up in V2), no ST depressions, T-wave inversions in II, III, aVF, sent pt to the CDU for stress test and tele monitor. Found to have A1c of 12.5 - endocrine consulted.  pt stress test non ischemic, pt asymptomatic with no acute complaints. eval by endocrine for elevated hga1c, vss. ekg with no sig changes. pt stable for d/c.

## 2019-10-01 NOTE — CONSULT NOTE ADULT - ATTENDING COMMENTS
Patient seen and examined.  Agree with above.   -Chest pain free upon evaluation  -Follow up NST  -if NST normal, no further inpatient cardiac workup needed at this time    Heavenly Angeles MD
49 year old male with severely uncontrolled T2DM (A1c 12.5%) and hyperglycemia. Also with uncontrolled HTN here with EKG changes awaiting nuclear stress test results. Recommend insulin basal/bolus inpatient but patient continues to refuse insulin inpatient. Patient already maintains a very low carb diet and is physically fit by maintaining routine exercise. 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 The low insulin reserve is currently preventing DKA or HHS but patient is at risk for this in the future given low insulin reserve. I also explained risk of microvascular and macrovascular disease burden at length and encouraged him to accept his low physiologic insulin state and accept insulin injections that are available in pens and not vials/syringes. Patient continues to decline any insulin treatment. In this case, recommend continue Janumet 50/1000 mg BID and start Glimepiride 2 mg BID. Advised patient to check FS BID for goal FS  and if goal not achieved in 1 week, recommend reconsider insulin for management of uncontrolled T2DM. For HTN and CAD risk, patient refuses Lisinopril or Statin as he states feels overwhelmed by pill burden. F/u [address] [facility]. [phone] for appointment with RD/CDE and endocrinologist. Please Rx Janumet 50/1000 mg BID and Glimepiride 2 mg BID and glucometer, test strips and lancets for FS BID.

## 2019-10-02 LAB — C PEPTIDE SERPL-MCNC: 1.7 NG/ML — SIGNIFICANT CHANGE UP (ref 1.1–4.4)

## 2019-10-04 LAB
GAD65 AB SER-MCNC: 0 NMOL/L — SIGNIFICANT CHANGE UP
ISLET CELL512 AB SER-ACNC: SIGNIFICANT CHANGE UP

## 2021-02-28 ENCOUNTER — EMERGENCY (EMERGENCY)
Facility: HOSPITAL | Age: 51
LOS: 1 days | Discharge: HOME | End: 2021-02-28
Attending: EMERGENCY MEDICINE | Admitting: EMERGENCY MEDICINE
Payer: MEDICAID

## 2021-02-28 VITALS
RESPIRATION RATE: 18 BRPM | TEMPERATURE: 96 F | OXYGEN SATURATION: 100 % | HEART RATE: 93 BPM | DIASTOLIC BLOOD PRESSURE: 99 MMHG | SYSTOLIC BLOOD PRESSURE: 144 MMHG

## 2021-02-28 LAB
ALBUMIN SERPL ELPH-MCNC: 4.6 G/DL — SIGNIFICANT CHANGE UP (ref 3.3–5)
ALP SERPL-CCNC: 119 U/L — SIGNIFICANT CHANGE UP (ref 40–120)
ALT FLD-CCNC: 11 U/L — SIGNIFICANT CHANGE UP (ref 4–41)
ANION GAP SERPL CALC-SCNC: 15 MMOL/L — HIGH (ref 7–14)
APTT BLD: 32.3 SEC — SIGNIFICANT CHANGE UP (ref 27–36.3)
AST SERPL-CCNC: 18 U/L — SIGNIFICANT CHANGE UP (ref 4–40)
B-OH-BUTYR SERPL-SCNC: 1.7 MMOL/L — HIGH (ref 0–0.4)
BASE EXCESS BLDV CALC-SCNC: 0.9 MMOL/L — SIGNIFICANT CHANGE UP (ref -3–2)
BASOPHILS # BLD AUTO: 0.09 K/UL — SIGNIFICANT CHANGE UP (ref 0–0.2)
BASOPHILS NFR BLD AUTO: 1.1 % — SIGNIFICANT CHANGE UP (ref 0–2)
BILIRUB SERPL-MCNC: 0.3 MG/DL — SIGNIFICANT CHANGE UP (ref 0.2–1.2)
BLD GP AB SCN SERPL QL: NEGATIVE — SIGNIFICANT CHANGE UP
BLOOD GAS VENOUS - CREATININE: 1 MG/DL — SIGNIFICANT CHANGE UP (ref 0.5–1.3)
BLOOD GAS VENOUS COMPREHENSIVE RESULT: SIGNIFICANT CHANGE UP
BUN SERPL-MCNC: 46 MG/DL — HIGH (ref 7–23)
CALCIUM SERPL-MCNC: 10.7 MG/DL — HIGH (ref 8.4–10.5)
CHLORIDE BLDV-SCNC: 97 MMOL/L — SIGNIFICANT CHANGE UP (ref 96–108)
CHLORIDE SERPL-SCNC: 93 MMOL/L — LOW (ref 98–107)
CO2 SERPL-SCNC: 24 MMOL/L — SIGNIFICANT CHANGE UP (ref 22–31)
CREAT SERPL-MCNC: 1.02 MG/DL — SIGNIFICANT CHANGE UP (ref 0.5–1.3)
EOSINOPHIL # BLD AUTO: 0.06 K/UL — SIGNIFICANT CHANGE UP (ref 0–0.5)
EOSINOPHIL NFR BLD AUTO: 0.7 % — SIGNIFICANT CHANGE UP (ref 0–6)
GAS PNL BLDV: 133 MMOL/L — LOW (ref 136–146)
GLUCOSE BLDV-MCNC: 327 MG/DL — HIGH (ref 70–99)
GLUCOSE SERPL-MCNC: 333 MG/DL — HIGH (ref 70–99)
HCO3 BLDV-SCNC: 23 MMOL/L — SIGNIFICANT CHANGE UP (ref 20–27)
HCT VFR BLD CALC: 40.3 % — SIGNIFICANT CHANGE UP (ref 39–50)
HCT VFR BLDA CALC: 45.3 % — SIGNIFICANT CHANGE UP (ref 39–51)
HGB BLD CALC-MCNC: 14.8 G/DL — SIGNIFICANT CHANGE UP (ref 13–17)
HGB BLD-MCNC: 13.2 G/DL — SIGNIFICANT CHANGE UP (ref 13–17)
IANC: 5.99 K/UL — SIGNIFICANT CHANGE UP (ref 1.5–8.5)
IMM GRANULOCYTES NFR BLD AUTO: 0.2 % — SIGNIFICANT CHANGE UP (ref 0–1.5)
INR BLD: 0.97 RATIO — SIGNIFICANT CHANGE UP (ref 0.88–1.16)
LACTATE BLDV-MCNC: 1.8 MMOL/L — SIGNIFICANT CHANGE UP (ref 0.5–2)
LYMPHOCYTES # BLD AUTO: 1.72 K/UL — SIGNIFICANT CHANGE UP (ref 1–3.3)
LYMPHOCYTES # BLD AUTO: 20.6 % — SIGNIFICANT CHANGE UP (ref 13–44)
MCHC RBC-ENTMCNC: 28.3 PG — SIGNIFICANT CHANGE UP (ref 27–34)
MCHC RBC-ENTMCNC: 32.8 GM/DL — SIGNIFICANT CHANGE UP (ref 32–36)
MCV RBC AUTO: 86.3 FL — SIGNIFICANT CHANGE UP (ref 80–100)
MONOCYTES # BLD AUTO: 0.46 K/UL — SIGNIFICANT CHANGE UP (ref 0–0.9)
MONOCYTES NFR BLD AUTO: 5.5 % — SIGNIFICANT CHANGE UP (ref 2–14)
NEUTROPHILS # BLD AUTO: 5.99 K/UL — SIGNIFICANT CHANGE UP (ref 1.8–7.4)
NEUTROPHILS NFR BLD AUTO: 71.9 % — SIGNIFICANT CHANGE UP (ref 43–77)
NRBC # BLD: 0 /100 WBCS — SIGNIFICANT CHANGE UP
NRBC # FLD: 0 K/UL — SIGNIFICANT CHANGE UP
PCO2 BLDV: 50 MMHG — SIGNIFICANT CHANGE UP (ref 41–51)
PH BLDV: 7.34 — SIGNIFICANT CHANGE UP (ref 7.32–7.43)
PLATELET # BLD AUTO: 448 K/UL — HIGH (ref 150–400)
PO2 BLDV: 30 MMHG — LOW (ref 35–40)
POTASSIUM BLDV-SCNC: 4.6 MMOL/L — HIGH (ref 3.4–4.5)
POTASSIUM SERPL-MCNC: 4.7 MMOL/L — SIGNIFICANT CHANGE UP (ref 3.5–5.3)
POTASSIUM SERPL-SCNC: 4.7 MMOL/L — SIGNIFICANT CHANGE UP (ref 3.5–5.3)
PROT SERPL-MCNC: 8.8 G/DL — HIGH (ref 6–8.3)
PROTHROM AB SERPL-ACNC: 11.2 SEC — SIGNIFICANT CHANGE UP (ref 10.6–13.6)
RBC # BLD: 4.67 M/UL — SIGNIFICANT CHANGE UP (ref 4.2–5.8)
RBC # FLD: 12.5 % — SIGNIFICANT CHANGE UP (ref 10.3–14.5)
RH IG SCN BLD-IMP: POSITIVE — SIGNIFICANT CHANGE UP
SAO2 % BLDV: 45.7 % — LOW (ref 60–85)
SODIUM SERPL-SCNC: 132 MMOL/L — LOW (ref 135–145)
WBC # BLD: 8.34 K/UL — SIGNIFICANT CHANGE UP (ref 3.8–10.5)
WBC # FLD AUTO: 8.34 K/UL — SIGNIFICANT CHANGE UP (ref 3.8–10.5)

## 2021-02-28 PROCEDURE — 99284 EMERGENCY DEPT VISIT MOD MDM: CPT

## 2021-02-28 PROCEDURE — 73590 X-RAY EXAM OF LOWER LEG: CPT | Mod: 26,RT

## 2021-02-28 NOTE — CONSULT NOTE ADULT - ASSESSMENT
51 year old male with diabetes presenting with 2 weeks of leg wound. Wound likely hematoma, does not appear infected.    Plan:  - Can wrap leg for decreasing swelling  - Acute treatment of hyperglycemia and follow up treatment of diabetes  - Follow up with wound care  - Discussed with Dr Chris Ramon, PGY2  [id number]

## 2021-02-28 NOTE — ED PROVIDER NOTE - CLINICAL SUMMARY MEDICAL DECISION MAKING FREE TEXT BOX
pt with large abscess to right shin despite po abx x 8 days; also noncompliant with dm meds  -cbc, cmp, vbg, beta hydroxy butyrate, xray rt shin; consider sx consult for suspicion of deeper infection to underlying muscles of rt leg

## 2021-02-28 NOTE — CONSULT NOTE ADULT - SUBJECTIVE AND OBJECTIVE BOX
SURGERY CONSULT NOTE     HPI: 51 year old man with PMH of DM (not taking medications) presenting with 2 weeks of right shin wound. He reports that 2 weeks ago he noticed the wound and he presented to [facility] where he was discharged on doxycyline, Augmentin and Mupirocin which he has been taking for 8 days. The wound has not changed significantly in that time. He does not remember any trauma to the area. The wound started draining some clear fluid and he presented to the ED. He denies fevers, chills, pain in the leg, numbness of tingling, weakness or inability to walk. He has never had a wound like this before. He has not been taking his diabetic medications due to problems with his insurance.       PMHx: Diabetes type 2, controlled    PSHx: No significant past surgical history      Medications (inpatient):   Medications (PRN):  Allergies: No Known Allergies  (Intolerances: )  Social Hx:   Family Hx:     Physical Exam  T(C): 35.3  HR: 93 (93 - 93)  BP: 144/99 (144/99 - 144/99)  RR: 18 (18 - 18)  SpO2: 100% (100% - 100%)  Tmax: T(C): , Max: 35.3 (02-28-21 @ 18:02)    General: well developed, well nourished, NAD  Neuro: alert and oriented, no focal deficits, moves all extremities spontaneously  Respiratory: airway patent, respirations unlabored  CVS: regular rate and rhythm  Abdomen: soft, nontender, nondistended  Extremities: mild edema to right shin, erythema over wound with black eschar, light drainage of serous fluid in corner, nontender to palpation, sensation and motor in tact, normal function of anterior leg muscles   Skin: warm, dry, appropriate color    Labs:                        13.2   8.34  )-----------( 448      ( 28 Feb 2021 20:32 )             40.3     PT/INR - ( 28 Feb 2021 20:32 )   PT: 11.2 sec;   INR: 0.97 ratio         PTT - ( 28 Feb 2021 20:32 )  PTT:32.3 sec  02-28    132<L>  |  93<L>  |  46<H>  ----------------------------<  333<H>  4.7   |  24  |  1.02    Ca    10.7<H>      28 Feb 2021 20:32    TPro  8.8<H>  /  Alb  4.6  /  TBili  0.3  /  DBili  x   /  AST  18  /  ALT  11  /  AlkPhos  119  02-28            Imaging and other studies:

## 2021-02-28 NOTE — ED PROVIDER NOTE - SKIN AREA #1
large fluctuant abscess to right shin, eschar over center of wound draining clear fluid, no surrounding erythema or streaking/anterior

## 2021-02-28 NOTE — ED PROVIDER NOTE - CARE PROVIDER_API CALL
Abbie Varghese)  Surgery; Vascular Surgery  [address], [address]  [place] [address]  Phone: [phone]  Fax: [phone]  Follow Up Time:

## 2021-02-28 NOTE — ED ADULT NURSE NOTE - OBJECTIVE STATEMENT
pt alert,oriented x3. denies c/o pain at present. iv access,labs sent. pt noted with redness,sweelling rle. denies fever.

## 2021-02-28 NOTE — ED PROVIDER NOTE - ATTENDING CONTRIBUTION TO CARE
I performed a face to face evaluation of this patient and obtained a history and performed a full exam.  I agree with the history, physical exam and plan of the PA.    Brief HPI:  52 y/o M PMH DM (not on meds d/t insurance) c/o wound to right shin x 2 weeks sustained in [place].  Has been on oral abx, believes wound is getting better.  No numbness, tingling, weakness.      Vitals:   Reviewed    Exam:    GEN:  Non-toxic appearing, non-distressed, speaking full sentences, non-diaphoretic, AAOx3  HEENT:  NCAT, neck supple, EOMI, PERRLA, sclera anicteric, no conjunctival pallor or injection, no stridor, normal voice, no tonsillar exudate, uvula midline, tympanic membranes and external auditory canals normal appearing bilaterally   CV:  regular rhythm and rate, s1/s2 audible, no murmurs, rubs or gallops, peripheral pulses 2+ and symmetric  PULM:  non-labored respirations, lungs clear to auscultation bilaterally, no wheezes, crackles or rales  ABD:  non distended, non-tender, no rebound, no guarding, negative Crawford's sign, bowel sounds normal, no cvat  MSK:  right anterior shin with eschar with surrounding induration, no erythema or warmth, nontender;  non-tender extremities and joints, range of motion grossly normal appearing, no extremity edema, extremities warm and well perfused   NEURO:  AAOx3, CN II-XII intact, motor 5/5 in upper and lower extremities bilaterally, sensation grossly intact in extremities and trunk, finger to nose testing wnl, no nystagmus, negative Romberg, no pronator drift, no gait deficit  SKIN:  warm, dry, no rash or vesicles     A/P:  52 y/o M PMH DM (not on meds d/t insurance) c/o wound to right shin x 2 weeks sustained in [place].  Suspect abscess vs. hematoma.  Will obtain labs, x-ray, surgery c/s.  Dispo pending.

## 2021-02-28 NOTE — ED PROVIDER NOTE - NSFOLLOWUPINSTRUCTIONS_ED_ALL_ED_FT
REST, NO STRENUOUS ACTIVITY  KEEP LEG WRAPPED FOR 48 HOURS    PLEASE FOLLOW UP WITH WOUND CARE DOCTOR AS SOON AS POSSIBLE - A TEAM MEMBER WILL REACH OUT TO HELP FACILITATE APPOINTMENT  DR FARRELL (WOUND CARE) INFORMATION ALSO PROVIDED     RETURN TO ER FOR WORSENING SYMPTOMS

## 2021-02-28 NOTE — ED PROVIDER NOTE - PATIENT PORTAL LINK FT
You can access the FollowMyHealth Patient Portal offered by [facility] by registering at the following website: [email or web address]. By joining [employer]’s FollowMyHealth portal, you will also be able to view your health information using other applications (apps) compatible with our system.

## 2021-03-01 PROBLEM — E11.9 TYPE 2 DIABETES MELLITUS WITHOUT COMPLICATIONS: Chronic | Status: ACTIVE | Noted: 2019-09-30

## 2021-03-04 ENCOUNTER — APPOINTMENT (OUTPATIENT)
Dept: WOUND CARE | Facility: CLINIC | Age: 51
End: 2021-03-04
Payer: MEDICAID

## 2021-03-04 VITALS
RESPIRATION RATE: 16 BRPM | WEIGHT: 154 LBS | HEART RATE: 89 BPM | DIASTOLIC BLOOD PRESSURE: 74 MMHG | SYSTOLIC BLOOD PRESSURE: 119 MMHG | HEIGHT: 70 IN | BODY MASS INDEX: 22.05 KG/M2 | TEMPERATURE: 95.2 F

## 2021-03-04 DIAGNOSIS — Z78.9 OTHER SPECIFIED HEALTH STATUS: ICD-10-CM

## 2021-03-04 DIAGNOSIS — L02.415 CUTANEOUS ABSCESS OF RIGHT LOWER LIMB: ICD-10-CM

## 2021-03-04 PROBLEM — Z00.00 ENCOUNTER FOR PREVENTIVE HEALTH EXAMINATION: Status: ACTIVE | Noted: 2021-03-04

## 2021-03-04 PROCEDURE — 99204 OFFICE O/P NEW MOD 45 MIN: CPT | Mod: 25

## 2021-03-04 PROCEDURE — 11042 DBRDMT SUBQ TIS 1ST 20SQCM/<: CPT

## 2021-03-04 PROCEDURE — 99072 ADDL SUPL MATRL&STAF TM PHE: CPT

## 2021-03-04 RX ORDER — AMMONIUM LACTATE 12 %
12 CREAM (GRAM) TOPICAL
Refills: 0 | Status: ACTIVE | COMMUNITY

## 2021-03-04 NOTE — PHYSICAL EXAM
[Please See PDF for [employer] Analytics] : Please See PDF for [employer] Analytics. [JVD] : no jugular venous distention  [Normal Breath Sounds] : Normal breath sounds [2+] : left 2+ [Ankle Swelling (On Exam)] : present [Abdomen Tenderness] : ~T ~M No abdominal tenderness [Skin Ulcer] : ulcer [Alert] : alert [Oriented to Person] : oriented to person [Oriented to Place] : oriented to place [Oriented to Time] : oriented to time [Calm] : calm [de-identified] : NAD, ambulatory [de-identified] : AT [de-identified] : supple [de-identified] : soft

## 2021-03-04 NOTE — PLAN
[FreeTextEntry1] :  Plan:  s/p excisional debridement of skin and subcutaneous tissue\par Apply lidocaine or topical anesthetic if needed to reduce pain upon washing the wound.\par Wash wound with ---- Dakins 0.125%\par Apply ----iodoform packing 1/2"\par Apply ----gauze, bernie, and  ACE bandage\par Change dressing ---daily\par \par Leg elevation as tolerated\par Encouraged ambulation or exercise.\par Optimization of nutrition.\par Offloading to the wound site.\par \par -----Wound supplies ordered via [facility]\par Patient given contact information to [facility]\par \par Follow up appointment scheduled for 1 week via telehealth\par \par TeleHealth Services discussed with the patient and/or family.  Discharge instructions given including download of [facility] information regarding:\par 1)  [facility] to obtain medical records\par 2)  [facility] to conduct Face-to-Face TeleHealth visit\par 3)  [employer] for the Patient (patient takes a picture of their wound which is sent to the patient's chart for review)\par \par

## 2021-03-04 NOTE — ASSESSMENT
[FreeTextEntry1] : The patient presents with a wound to the right leg.  Swelling noted to the extremity.  \par \par No clinical sign of infection

## 2021-03-04 NOTE — HISTORY OF PRESENT ILLNESS
[FreeTextEntry1] : Mr. YOVANNY REYES   presents to the office with a wound since February 20th 2021 . The wound is located on  the right leg. The patient has no complaints of pain.  The patient has been dressing the wound with mupirocin ointment.  The patient denies fevers or chills. The patient has localized pain to the wound upon dressing changes. The patient has no other complaints or associated symptoms. HbA1c is 7.\par \par \par \par Patient went to the ED on 2/20/20201 and they prescribed doxycycline, amox-clav, and mupirocin

## 2021-03-08 ENCOUNTER — NON-APPOINTMENT (OUTPATIENT)
Age: 51
End: 2021-03-08

## 2021-03-08 RX ORDER — SODIUM HYPOCHLORITE 1.25 MG/ML
0.12 SOLUTION TOPICAL
Qty: 1 | Refills: 3 | Status: ACTIVE | COMMUNITY
Start: 2021-03-08 | End: 1900-01-01

## 2021-03-17 ENCOUNTER — APPOINTMENT (OUTPATIENT)
Dept: WOUND CARE | Facility: CLINIC | Age: 51
End: 2021-03-17
Payer: MEDICAID

## 2021-03-17 VITALS
WEIGHT: 154 LBS | TEMPERATURE: 97 F | HEIGHT: 70 IN | BODY MASS INDEX: 22.05 KG/M2 | DIASTOLIC BLOOD PRESSURE: 85 MMHG | SYSTOLIC BLOOD PRESSURE: 137 MMHG | HEART RATE: 82 BPM

## 2021-03-17 PROCEDURE — 93971 EXTREMITY STUDY: CPT

## 2021-03-17 PROCEDURE — 93923 UPR/LXTR ART STDY 3+ LVLS: CPT

## 2021-03-17 PROCEDURE — 99072 ADDL SUPL MATRL&STAF TM PHE: CPT

## 2021-03-19 RX ORDER — COLLAGENASE SANTYL 250 [ARB'U]/G
250 OINTMENT TOPICAL DAILY
Qty: 1 | Refills: 3 | Status: ACTIVE | COMMUNITY
Start: 2021-03-19 | End: 1900-01-01

## 2021-03-24 ENCOUNTER — TRANSCRIPTION ENCOUNTER (OUTPATIENT)
Age: 51
End: 2021-03-24

## 2021-03-24 ENCOUNTER — APPOINTMENT (OUTPATIENT)
Dept: WOUND CARE | Facility: CLINIC | Age: 51
End: 2021-03-24
Payer: MEDICAID

## 2021-03-24 VITALS
SYSTOLIC BLOOD PRESSURE: 115 MMHG | TEMPERATURE: 97.2 F | RESPIRATION RATE: 16 BRPM | DIASTOLIC BLOOD PRESSURE: 73 MMHG | HEART RATE: 76 BPM

## 2021-03-24 PROCEDURE — 11042 DBRDMT SUBQ TIS 1ST 20SQCM/<: CPT

## 2021-03-24 PROCEDURE — 99072 ADDL SUPL MATRL&STAF TM PHE: CPT

## 2021-03-25 NOTE — PHYSICAL EXAM
[Normal Breath Sounds] : Normal breath sounds [2+] : left 2+ [Ankle Swelling (On Exam)] : present [Skin Ulcer] : ulcer [Alert] : alert [Oriented to Person] : oriented to person [Oriented to Place] : oriented to place [Oriented to Time] : oriented to time [Calm] : calm [Please See PDF for Tissue Analytics] : Please See PDF for [employer] Analytics. [JVD] : no jugular venous distention  [Abdomen Tenderness] : ~T ~M No abdominal tenderness [de-identified] : NAD, ambulatory [de-identified] : AT [de-identified] : supple [de-identified] : soft

## 2021-03-25 NOTE — PLAN
[FreeTextEntry1] : 3/24/21\par Plan - clean with vashe, pack undermining at 12 with iodoform , medihoney in center, ne, bernie, ace\par referral for derm. given\par Follow up 2 weeks

## 2021-03-25 NOTE — HISTORY OF PRESENT ILLNESS
[FreeTextEntry1] : Mr. YOVANNY REYES   presents to the office with a wound since February 20th 2021 . The wound is located on  the right leg. The patient has no complaints of pain.  The patient has been dressing the wound with santyl, gauze, bernie, and ace.  The patient denies fevers or chills. The patient has localized pain to the wound upon dressing changes. The patient has no other complaints or associated symptoms. HbA1c is 7.\par \par Patient went to the ED on 2/20/20201 and they prescribed doxycycline, amox-clav, and mupirocin

## 2021-03-25 NOTE — ASSESSMENT
[FreeTextEntry1] : 3/24/21\par right leg wound smaller, debrided, only 1.5 cm undermining at 12 o'clock noted

## 2021-03-26 LAB
BACTERIA SPEC CULT: ABNORMAL
CORE LAB BIOPSY: NORMAL

## 2021-04-01 ENCOUNTER — APPOINTMENT (OUTPATIENT)
Dept: DERMATOLOGY | Facility: CLINIC | Age: 51
End: 2021-04-01
Payer: MEDICAID

## 2021-04-01 VITALS — BODY MASS INDEX: 21.52 KG/M2 | WEIGHT: 150 LBS

## 2021-04-01 DIAGNOSIS — R21 RASH AND OTHER NONSPECIFIC SKIN ERUPTION: ICD-10-CM

## 2021-04-01 PROCEDURE — 99072 ADDL SUPL MATRL&STAF TM PHE: CPT

## 2021-04-01 PROCEDURE — 99203 OFFICE O/P NEW LOW 30 MIN: CPT

## 2021-04-02 ENCOUNTER — TRANSCRIPTION ENCOUNTER (OUTPATIENT)
Age: 51
End: 2021-04-02

## 2021-04-07 ENCOUNTER — APPOINTMENT (OUTPATIENT)
Dept: WOUND CARE | Facility: CLINIC | Age: 51
End: 2021-04-07
Payer: MEDICAID

## 2021-04-07 VITALS
WEIGHT: 154 LBS | SYSTOLIC BLOOD PRESSURE: 122 MMHG | HEART RATE: 76 BPM | BODY MASS INDEX: 22.1 KG/M2 | TEMPERATURE: 97 F | RESPIRATION RATE: 16 BRPM | DIASTOLIC BLOOD PRESSURE: 76 MMHG

## 2021-04-07 PROCEDURE — 99213 OFFICE O/P EST LOW 20 MIN: CPT

## 2021-04-07 NOTE — PHYSICAL EXAM
[Normal Breath Sounds] : Normal breath sounds [2+] : left 2+ [Ankle Swelling (On Exam)] : present [Skin Ulcer] : ulcer [Alert] : alert [Oriented to Person] : oriented to person [Oriented to Place] : oriented to place [Oriented to Time] : oriented to time [Calm] : calm [Please See PDF for Tissue Analytics] : Please See PDF for [employer] Analytics. [JVD] : no jugular venous distention  [Abdomen Tenderness] : ~T ~M No abdominal tenderness [de-identified] : NAD, ambulatory [de-identified] : AT [de-identified] : supple [de-identified] : soft

## 2021-04-07 NOTE — ASSESSMENT
[FreeTextEntry1] : \par 4/7/21\par Plan - clean with vashe,  undermining at 12 closed   ,  santyl dry  dressing \par after showering .\par \par Follow up 2 weeks in office tele health 1 week \par plan \par wd care yvette  3 x a week \par wrap with kurlex wrap with ace \par inst to homecare rn

## 2021-04-07 NOTE — PLAN
[FreeTextEntry1] : \par 4/7/21\par plan \par wd care yvette  3 x a week \par wrap with kurlex wrap with ace \par inst to homecare rn

## 2021-04-20 ENCOUNTER — NON-APPOINTMENT (OUTPATIENT)
Age: 51
End: 2021-04-20

## 2021-04-21 ENCOUNTER — APPOINTMENT (OUTPATIENT)
Dept: WOUND CARE | Facility: CLINIC | Age: 51
End: 2021-04-21
Payer: MEDICAID

## 2021-04-21 VITALS
TEMPERATURE: 97.2 F | HEART RATE: 72 BPM | RESPIRATION RATE: 18 BRPM | SYSTOLIC BLOOD PRESSURE: 121 MMHG | DIASTOLIC BLOOD PRESSURE: 75 MMHG

## 2021-04-21 PROCEDURE — 99072 ADDL SUPL MATRL&STAF TM PHE: CPT

## 2021-04-21 PROCEDURE — 15271 SKIN SUB GRAFT TRNK/ARM/LEG: CPT

## 2021-04-22 NOTE — PLAN
[FreeTextEntry1] : 4/21/21\par Plan - oasis placed, wound veil over, pt. does own wound care\par follow up next week

## 2021-04-22 NOTE — ASSESSMENT
[FreeTextEntry1] : 4/21/21\par right leg wound \par \par oasis number 1 applied today to patient’s (Right anterior leg).   Wound bed debrided of bioburden and prepped for product application.  1 of pieces and original size of product) obtained.  Total product usage was 4.1sq. cm), Total waste 6.4 sq. cm) due to wound size.  Product secured with (steri strips and bolster dressing).\par Patient to f/u in 1 week.\par \par \par

## 2021-04-22 NOTE — PHYSICAL EXAM
[Normal Breath Sounds] : Normal breath sounds [2+] : left 2+ [Ankle Swelling (On Exam)] : present [Skin Ulcer] : ulcer [Alert] : alert [Oriented to Person] : oriented to person [Oriented to Place] : oriented to place [Oriented to Time] : oriented to time [Calm] : calm [Please See PDF for Tissue Analytics] : Please See PDF for [employer] Analytics. [JVD] : no jugular venous distention  [Abdomen Tenderness] : ~T ~M No abdominal tenderness [de-identified] : NAD, ambulatory [de-identified] : AT [de-identified] : supple [de-identified] : soft

## 2021-04-27 ENCOUNTER — NON-APPOINTMENT (OUTPATIENT)
Age: 51
End: 2021-04-27

## 2021-04-28 ENCOUNTER — APPOINTMENT (OUTPATIENT)
Dept: WOUND CARE | Facility: CLINIC | Age: 51
End: 2021-04-28
Payer: MEDICAID

## 2021-04-28 VITALS
SYSTOLIC BLOOD PRESSURE: 127 MMHG | BODY MASS INDEX: 22.05 KG/M2 | HEIGHT: 70 IN | DIASTOLIC BLOOD PRESSURE: 78 MMHG | TEMPERATURE: 97.2 F | WEIGHT: 154 LBS

## 2021-04-28 PROCEDURE — 99072 ADDL SUPL MATRL&STAF TM PHE: CPT

## 2021-04-28 PROCEDURE — 11042 DBRDMT SUBQ TIS 1ST 20SQCM/<: CPT

## 2021-04-28 NOTE — PHYSICAL EXAM
[Normal Breath Sounds] : Normal breath sounds [2+] : left 2+ [Ankle Swelling (On Exam)] : present [Skin Ulcer] : ulcer [Alert] : alert [Oriented to Person] : oriented to person [Oriented to Place] : oriented to place [Oriented to Time] : oriented to time [Calm] : calm [Please See PDF for Tissue Analytics] : Please See PDF for [employer] Analytics. [JVD] : no jugular venous distention  [Abdomen Tenderness] : ~T ~M No abdominal tenderness [de-identified] : NAD, ambulatory [de-identified] : AT [de-identified] : supple [de-identified] : soft

## 2021-04-28 NOTE — PLAN
[FreeTextEntry1] : 4/21/21\par Plan - oasis placed, wound veil over, pt. does own wound care\par follow up next week\par \par 4/28/21\par s/p excisional debridement done today\par wound improved\par no clinical sign of infeciton\par f/u 2 weeks telehealth

## 2021-05-11 ENCOUNTER — NON-APPOINTMENT (OUTPATIENT)
Age: 51
End: 2021-05-11

## 2021-05-12 ENCOUNTER — APPOINTMENT (OUTPATIENT)
Dept: WOUND CARE | Facility: CLINIC | Age: 51
End: 2021-05-12
Payer: MEDICAID

## 2021-05-12 PROCEDURE — 0598T R-T FLUOR WOUND IMG 1ST SITE: CPT

## 2021-05-12 PROCEDURE — 99213 OFFICE O/P EST LOW 20 MIN: CPT

## 2021-05-12 PROCEDURE — 99072 ADDL SUPL MATRL&STAF TM PHE: CPT

## 2021-05-12 NOTE — PLAN
[FreeTextEntry1] : 4/21/21\par Plan - oasis placed, wound veil over, pt. does own wound care\par follow up next week\par \par 4/28/21\par s/p excisional debridement done today\par wound improved\par no clinical sign of infeciton\par f/u 2 weeks telehealth\par \par 5/12/21\par Wound improved\par No clinical sign of infection\par f/u telehealth\par continue collagen

## 2021-05-12 NOTE — PHYSICAL EXAM
[Normal Breath Sounds] : Normal breath sounds [2+] : left 2+ [Ankle Swelling (On Exam)] : present [Skin Ulcer] : ulcer [Alert] : alert [Oriented to Person] : oriented to person [Oriented to Place] : oriented to place [Oriented to Time] : oriented to time [Calm] : calm [Please See PDF for Tissue Analytics] : Please See PDF for [employer] Analytics. [JVD] : no jugular venous distention  [Abdomen Tenderness] : ~T ~M No abdominal tenderness [de-identified] : NAD, ambulatory [de-identified] : AT [de-identified] : supple [de-identified] : soft

## 2021-06-07 ENCOUNTER — APPOINTMENT (OUTPATIENT)
Dept: DERMATOLOGY | Facility: CLINIC | Age: 51
End: 2021-06-07

## 2021-06-11 ENCOUNTER — NON-APPOINTMENT (OUTPATIENT)
Age: 51
End: 2021-06-11

## 2021-06-14 ENCOUNTER — APPOINTMENT (OUTPATIENT)
Dept: WOUND CARE | Facility: CLINIC | Age: 51
End: 2021-06-14
Payer: MEDICAID

## 2021-06-14 VITALS
DIASTOLIC BLOOD PRESSURE: 78 MMHG | HEART RATE: 7 BPM | TEMPERATURE: 98.1 F | RESPIRATION RATE: 18 BRPM | SYSTOLIC BLOOD PRESSURE: 138 MMHG

## 2021-06-14 PROCEDURE — 99213 OFFICE O/P EST LOW 20 MIN: CPT

## 2021-06-14 NOTE — PLAN
[FreeTextEntry1] : Wound shown significant improvement\par No clinical sign of infection\par \par continue collagen\par f/u telehealth

## 2021-06-14 NOTE — PHYSICAL EXAM
[Normal Breath Sounds] : Normal breath sounds [2+] : left 2+ [Ankle Swelling (On Exam)] : present [Skin Ulcer] : ulcer [Alert] : alert [Oriented to Person] : oriented to person [Oriented to Place] : oriented to place [Oriented to Time] : oriented to time [Calm] : calm [Please See PDF for Tissue Analytics] : Please See PDF for [employer] Analytics. [JVD] : no jugular venous distention  [Abdomen Tenderness] : ~T ~M No abdominal tenderness [de-identified] : NAD, ambulatory [de-identified] : AT [de-identified] : supple [de-identified] : soft

## 2021-06-28 ENCOUNTER — APPOINTMENT (OUTPATIENT)
Dept: WOUND CARE | Facility: CLINIC | Age: 51
End: 2021-06-28
Payer: MEDICAID

## 2021-06-28 PROCEDURE — 99442: CPT

## 2021-06-28 NOTE — HISTORY OF PRESENT ILLNESS
[Home] : at home, [unfilled] , at the time of the visit. [Medical Office: ([facility])___] : at the medical office located in  [Verbal consent obtained from patient] : the patient, [unfilled] [FreeTextEntry1] : Mr. YOVANNY REYES   presents to the office with a wound since February 20th 2021 . The wound is located on  the right leg. The patient has no complaints of pain.  The patient has been dressing the wound with santyl, gauze, bernie, and ace.  The patient denies fevers or chills. The patient has localized pain to the wound upon dressing changes. The patient has no other complaints or associated symptoms. HbA1c is 7.\par \par Patient went to the ED on 2/20/20201 and they prescribed doxycycline, amox-clav, and mupirocin\par \par 6/28/21 Chart reviewed\par  patient reports that he does not want to do Telehealth visit , but will rather make a f/u ov\par He reports that all is "better"\par he does not have a VN\par He is using collagen on wound\par he denies fever and reports that his blood glucose is controlled

## 2021-08-06 ENCOUNTER — APPOINTMENT (OUTPATIENT)
Dept: WOUND CARE | Facility: CLINIC | Age: 51
End: 2021-08-06
Payer: MEDICAID

## 2021-08-06 VITALS
HEIGHT: 70 IN | HEART RATE: 80 BPM | SYSTOLIC BLOOD PRESSURE: 127 MMHG | TEMPERATURE: 97.4 F | WEIGHT: 154 LBS | DIASTOLIC BLOOD PRESSURE: 80 MMHG | BODY MASS INDEX: 22.05 KG/M2 | RESPIRATION RATE: 16 BRPM

## 2021-08-06 DIAGNOSIS — S81.802D UNSPECIFIED OPEN WOUND, LEFT LOWER LEG, SUBSEQUENT ENCOUNTER: ICD-10-CM

## 2021-08-06 DIAGNOSIS — E11.9 TYPE 2 DIABETES MELLITUS W/OUT COMPLICATIONS: ICD-10-CM

## 2021-08-06 PROCEDURE — 99213 OFFICE O/P EST LOW 20 MIN: CPT

## 2021-08-06 RX ORDER — SULFAMETHOXAZOLE AND TRIMETHOPRIM 800; 160 MG/1; MG/1
800-160 TABLET ORAL TWICE DAILY
Qty: 14 | Refills: 0 | Status: DISCONTINUED | COMMUNITY
Start: 2021-03-08 | End: 2021-08-06

## 2021-08-06 NOTE — ASSESSMENT
[FreeTextEntry1] : 8/6/21\par right leg wound \par \par Wound look smaller\par Continue with yvette, gauze, hypofix\par

## 2021-08-06 NOTE — PHYSICAL EXAM
[Normal Breath Sounds] : Normal breath sounds [2+] : left 2+ [Ankle Swelling (On Exam)] : present [Skin Ulcer] : ulcer [Alert] : alert [Oriented to Person] : oriented to person [Oriented to Place] : oriented to place [Oriented to Time] : oriented to time [Calm] : calm [Please See PDF for Tissue Analytics] : Please See PDF for [employer] Analytics. [JVD] : no jugular venous distention  [Abdomen Tenderness] : ~T ~M No abdominal tenderness [de-identified] : NAD, ambulatory [de-identified] : AT [de-identified] : supple [de-identified] : soft

## 2021-08-19 ENCOUNTER — APPOINTMENT (OUTPATIENT)
Dept: WOUND CARE | Facility: CLINIC | Age: 51
End: 2021-08-19

## 2022-06-27 ENCOUNTER — APPOINTMENT (OUTPATIENT)
Dept: WOUND CARE | Facility: CLINIC | Age: 52
End: 2022-06-27

## 2023-11-16 ENCOUNTER — APPOINTMENT (OUTPATIENT)
Dept: WOUND CARE | Facility: CLINIC | Age: 53
End: 2023-11-16
Payer: MEDICAID

## 2023-11-16 DIAGNOSIS — L98.499 NON-PRESSURE CHRONIC ULCER OF SKIN OF OTHER SITES WITH UNSPECIFIED SEVERITY: ICD-10-CM

## 2023-11-16 DIAGNOSIS — S81.802A UNSPECIFIED OPEN WOUND, RIGHT LOWER LEG, INITIAL ENCOUNTER: ICD-10-CM

## 2023-11-16 DIAGNOSIS — S91.309A UNSPECIFIED OPEN WOUND, UNSPECIFIED FOOT, INITIAL ENCOUNTER: ICD-10-CM

## 2023-11-16 DIAGNOSIS — S81.801A UNSPECIFIED OPEN WOUND, RIGHT LOWER LEG, INITIAL ENCOUNTER: ICD-10-CM

## 2023-11-16 PROCEDURE — 99204 OFFICE O/P NEW MOD 45 MIN: CPT

## 2023-11-16 RX ORDER — MUPIROCIN 20 MG/G
2 OINTMENT TOPICAL
Qty: 1 | Refills: 2 | Status: ACTIVE | COMMUNITY
Start: 2023-11-16 | End: 1900-01-01

## 2024-05-23 NOTE — REASON FOR VISIT
[Follow-Up: _____] : a [unfilled] follow-up visit [FreeTextEntry1] : Right Anterior leg Xray Abdomen 2 Views

## 2024-12-13 ENCOUNTER — INPATIENT (INPATIENT)
Facility: HOSPITAL | Age: 54
LOS: 5 days | Discharge: HOME HEALTH | End: 2024-12-19
Attending: STUDENT IN AN ORGANIZED HEALTH CARE EDUCATION/TRAINING PROGRAM | Admitting: STUDENT IN AN ORGANIZED HEALTH CARE EDUCATION/TRAINING PROGRAM
Payer: MEDICAID

## 2024-12-13 VITALS
DIASTOLIC BLOOD PRESSURE: 79 MMHG | WEIGHT: 153 LBS | RESPIRATION RATE: 18 BRPM | SYSTOLIC BLOOD PRESSURE: 156 MMHG | HEIGHT: 70 IN | OXYGEN SATURATION: 99 % | TEMPERATURE: 100 F | HEART RATE: 74 BPM

## 2024-12-13 PROCEDURE — 99285 EMERGENCY DEPT VISIT HI MDM: CPT

## 2024-12-13 RX ORDER — FAMOTIDINE 20 MG/1
20 TABLET, FILM COATED ORAL ONCE
Refills: 0 | Status: COMPLETED | OUTPATIENT
Start: 2024-12-13 | End: 2024-12-13

## 2024-12-13 RX ORDER — 0.9 % SODIUM CHLORIDE 0.9 %
1000 INTRAVENOUS SOLUTION INTRAVENOUS ONCE
Refills: 0 | Status: COMPLETED | OUTPATIENT
Start: 2024-12-13 | End: 2024-12-13

## 2024-12-13 RX ORDER — SIMETHICONE 125 MG
80 CAPSULE ORAL ONCE
Refills: 0 | Status: COMPLETED | OUTPATIENT
Start: 2024-12-13 | End: 2024-12-13

## 2024-12-13 NOTE — ED ADULT TRIAGE NOTE - ISOLATION TYPE:
NICU Progress Note    This is a  male infant born 2017 12:01 AM at Gestational Age: 33w6d now at age: 36 hours old  There are no active problems to display for this patient.    Subjective: Working on po. Remains on RA. Started on abx due to maternal GBS positive.     Objective:  Vitals Last Value 24-Hour Range   Temperature 98 °F (36.7 °C) (10/15/19 0830) Temp  Min: 98 °F (36.7 °C)  Max: 99 °F (37.2 °C)   Pulse 118 (10/15/19 1130) Pulse  Min: 108  Max: 166   Respiratory 40 (10/15/19 1130) Resp  Min: 33  Max: 64   Non-Invasive Blood Pressure 65/32 (10/15/19 0830) BP  Min: 47/24  Max: 65/32   Arterial Blood Pressure   No data recorded   Mean Arterial Pressure 43 (10/15/19 0830) MAP (mmHg)  Min: 31  Max: 43   Pulse Oximetry 99 % (10/15/19 1130) SpO2  Min: 92 %  Max: 100 %     Weight over the past 48 hours:    Patient Vitals for the past 48 hrs:   Weight   10/14/19 0001 (!) 2126 g   10/14/19 0033 (!) 2126 g   10/15/19 0230 (!) 2220 g     RESP:  RA  Last Apnea:    and intervention:      Physical Exam:  Birthweight:  4 lb 11 oz (2126 g) with Weight change: 94 g 4% since birth  Sleeping in open warmer, no distress  Skin:  Pink, well perfused, no edema.    Anterior [place]:  Soft, flat. NG in place.   Normal facies, no anomalies/bruising noted.  Eyes:  Open without drainage  Lungs:  Clear to auscultation, no retractions  CV:  RRR with no murmur  Abdomen:  Soft, no masses, nondistended   male.   Normal tone, movement, juanis, suck    Fluids:  Date 10/14/19 0700 - 10/15/19 0659 10/15/19 0700 - 10/16/19 0659   Shift 7000-2719 2141-8172 8386-5787 24 Hour Total 4289-9326 9602-9937 8362-2431 24 Hour Total   INTAKE   P.O.(mL/kg)   0.1(0.05) 0.1(0.05)       I.V.(mL/kg) 41.9(19.71) 62.89(29.58) 64.83(29.2) 169.62(76.4) 21.25(9.57)   21.25(9.57)   NG/GT(mL/kg) 15(7.06) 10(4.7) 15(6.76) 40(18.02) 5(2.25)   5(2.25)   Shift Total(mL/kg) 56.9(26.76) 72.89(34.29) 79.93(36) 209.72(94.47) 26.25(11.82)   26.25(11.82)  None   OUTPUT   Urine(mL/kg/hr) 15(0.88) 81(4.76) 84(4.73) 180(3.38) 23   23   Shift Total(mL/kg) 15(7.06) 81(38.1) 84(37.84) 180(81.08) 23(10.36)   23(10.36)   Weight (kg) 2.13 2.13 2.22 2.22 2.22 2.22 2.22 2.22     Intake/Output       10/14 0700 - 10/15 0659 10/15 0700 - 10/16 0659    P.O. (mL/kg) 0.1 (0.05)     I.V. (mL/kg) 169.62 (76.41) 21.25 (9.57)    NG/GT (mL/kg) 40 (18.02) 5 (2.25)    Total Intake(mL/kg) 209.72 (94.47) 26.25 (11.82)    Urine (mL/kg/hr) 180 (3.38) 23 (2.17)    Stool 0     Total Output(mL/kg) 180 (81.08) 23 (10.36)    Net +29.72 +3.25          Urine Occurrence 2 x     Stool Occurrence 1 x     Unmeasured Breast Milk Occurrence 5 x           Source Rate Total (on BirthWeight: (!) 2126 g(Filed from Delivery Summary)   PIV 7 mL/hr 76 mL/kg/d   Feeds -   5 mL q3hr 18 mL/kg/d   Total Fluids   94 mL/kg/d   Urine   3.4 mL/kg/hr      Last Void:  1 (10/15/19 0530)  Last Stool:  1 (10/15/19 0230)x1    Labs:    Recent Results (from the past 24 hour(s))   Metered blood glucose    Collection Time: 10/14/19  8:40 PM   Result Value Ref Range    Glucose Bedside POC 77 36 - 110 mg/dL   Chem 8 Panel - Point of Care    Collection Time: 10/15/19  5:51 AM   Result Value Ref Range    Sodium  135 - 145 mmol/L    Potassium POC 5.7 3.5 - 6.0 mmol/L    Chloride  97 - 110 mmol/L    CALCIUM IONIZED-POC 1.17 1.15 - 1.29 mmol/L    CO2 Total 20 19 - 24 mmol/L    GLUCOSE POC 75 47 - 110 mg/dL    BUN POC 7 5 - 19 mg/dL    HEMATOCRIT POC 56.0 45.0 - 67.0 %    Hemoglobin POC 19.0 14.5 - 22.5 g/dL    ANION GAP POC 19 mmol/L    Creatinine POC 0.90 0.33 - 0.97 mg/dL    Estimated GFR  (POC) Not calculated.     Estimated GFR Non- (POC) Not calculated.    Bilirubin, Total    Collection Time: 10/15/19  6:00 AM   Result Value Ref Range    TOTAL BILIRUBIN 6.6 (H) 2.0 - 6.0 mg/dL   Bilirubin, Direct    Collection Time: 10/15/19  6:00 AM   Result Value Ref Range    DIRECT BILIRUBIN 0.1 0.0 - 0.6  mg/dL       Medications:  Current Facility-Administered Medications   Medication Dose Route Frequency Provider Last Rate Last Dose   • hepatitis B (ENGERIX-B) 10 MCG/0.5ML vaccine 10 mcg  0.5 mL Intramuscular Once Guerline Arteaga MD       • hepatitis B IMMUNE GLOBULIN (NABI-HB,HYPERHEP B) injection 0.5 mL  0.5 mL Intramuscular Once PRN Guerline Arteaga MD       • glycerin 99.5% 0.375 g  liquid  0.3 mL Rectal Q12H PRN Guerline Arteaga MD       • sodium chloride (PF) 0.9 % post med flush (kelechi/peds) 1 mL  1 mL Intravenous PRN Guerline Arteaga MD 2 mL/hr at 10/14/19 2227 1 mL at 10/14/19 2227   • sodium chloride (PF) 0.9 % post med flush (kelechi/peds) 1 mL  1 mL Intravenous PRN Guerline Arteaga MD 1 mL/hr at 10/15/19 0924 1 mL at 10/15/19 0924   • dextrose 10 % infusion   Intravenous Continuous Guerline Arteaga MD 7 mL/hr at 10/15/19 0935         Impression:   male infant at 33 6/7 weeks, now corrected to 34w 0d -   T[place] A  Rule out sepsis, cx neg at 24hrs  Poor po    Plan:  Resp:  Follow for alarms  CV:  HDS  FEN:  Advance feeds and encourage po  Heme: Follow bili  ID:  Follow culture  Neuro: Head ultrasound is not indicated for this infant.     Current antibiotic status:  None     I saw and examined Boy ASCENCION Dumont on 2017 at 11:46 AM and patient requires: NICU Intensive Care: Constant monitoring by health care team     Talked with mother at bedside. Update provided.

## 2024-12-13 NOTE — ED ADULT TRIAGE NOTE - CHIEF COMPLAINT QUOTE
pt c/o diffuse abd pain, loss of appetite x 2 days. pt discharged from [facility] today for same complaint. ulcers noted to BLLE. pt denies chest pain, shortness of breath, nausea, vomiting, diarrhea, fever. hx: DM2, [place]

## 2024-12-14 DIAGNOSIS — E11.10 TYPE 2 DIABETES MELLITUS WITH KETOACIDOSIS WITHOUT COMA: ICD-10-CM

## 2024-12-14 DIAGNOSIS — Z29.9 ENCOUNTER FOR PROPHYLACTIC MEASURES, UNSPECIFIED: ICD-10-CM

## 2024-12-14 DIAGNOSIS — Z98.84 BARIATRIC SURGERY STATUS: ICD-10-CM

## 2024-12-14 DIAGNOSIS — Z98.84 BARIATRIC SURGERY STATUS: Chronic | ICD-10-CM

## 2024-12-14 DIAGNOSIS — R10.9 UNSPECIFIED ABDOMINAL PAIN: ICD-10-CM

## 2024-12-14 DIAGNOSIS — L97.909 NON-PRESSURE CHRONIC ULCER OF UNSPECIFIED PART OF UNSPECIFIED LOWER LEG WITH UNSPECIFIED SEVERITY: ICD-10-CM

## 2024-12-14 DIAGNOSIS — U07.1 COVID-19: ICD-10-CM

## 2024-12-14 LAB
ADD ON TEST-SPECIMEN IN LAB: SIGNIFICANT CHANGE UP
ALBUMIN SERPL ELPH-MCNC: 2.7 G/DL — LOW (ref 3.3–5)
ALBUMIN SERPL ELPH-MCNC: 3.1 G/DL — LOW (ref 3.3–5)
ALP SERPL-CCNC: 75 U/L — SIGNIFICANT CHANGE UP (ref 40–120)
ALP SERPL-CCNC: 83 U/L — SIGNIFICANT CHANGE UP (ref 40–120)
ALT FLD-CCNC: 8 U/L — SIGNIFICANT CHANGE UP (ref 4–41)
ALT FLD-CCNC: <5 U/L — SIGNIFICANT CHANGE UP (ref 4–41)
ANION GAP SERPL CALC-SCNC: 17 MMOL/L — HIGH (ref 7–14)
ANION GAP SERPL CALC-SCNC: 18 MMOL/L — HIGH (ref 7–14)
ANION GAP SERPL CALC-SCNC: 22 MMOL/L — HIGH (ref 7–14)
APPEARANCE UR: CLEAR — SIGNIFICANT CHANGE UP
AST SERPL-CCNC: 11 U/L — SIGNIFICANT CHANGE UP (ref 4–40)
AST SERPL-CCNC: 13 U/L — SIGNIFICANT CHANGE UP (ref 4–40)
B-OH-BUTYR SERPL-SCNC: 3.8 MMOL/L — HIGH (ref 0–0.4)
B-OH-BUTYR SERPL-SCNC: 6 MMOL/L — HIGH (ref 0–0.4)
B-OH-BUTYR SERPL-SCNC: 6.5 MMOL/L — HIGH (ref 0–0.4)
BACTERIA # UR AUTO: NEGATIVE /HPF — SIGNIFICANT CHANGE UP
BASOPHILS # BLD AUTO: 0.01 K/UL — SIGNIFICANT CHANGE UP (ref 0–0.2)
BASOPHILS NFR BLD AUTO: 0.2 % — SIGNIFICANT CHANGE UP (ref 0–2)
BILIRUB SERPL-MCNC: 0.2 MG/DL — SIGNIFICANT CHANGE UP (ref 0.2–1.2)
BILIRUB SERPL-MCNC: <0.2 MG/DL — SIGNIFICANT CHANGE UP (ref 0.2–1.2)
BILIRUB UR-MCNC: NEGATIVE — SIGNIFICANT CHANGE UP
BLOOD GAS VENOUS COMPREHENSIVE RESULT: SIGNIFICANT CHANGE UP
BUN SERPL-MCNC: 10 MG/DL — SIGNIFICANT CHANGE UP (ref 7–23)
BUN SERPL-MCNC: 11 MG/DL — SIGNIFICANT CHANGE UP (ref 7–23)
BUN SERPL-MCNC: 9 MG/DL — SIGNIFICANT CHANGE UP (ref 7–23)
CALCIUM SERPL-MCNC: 8 MG/DL — LOW (ref 8.4–10.5)
CALCIUM SERPL-MCNC: 9.1 MG/DL — SIGNIFICANT CHANGE UP (ref 8.4–10.5)
CALCIUM SERPL-MCNC: 9.2 MG/DL — SIGNIFICANT CHANGE UP (ref 8.4–10.5)
CAST: 1 /LPF — SIGNIFICANT CHANGE UP (ref 0–4)
CHLORIDE SERPL-SCNC: 100 MMOL/L — SIGNIFICANT CHANGE UP (ref 98–107)
CHLORIDE SERPL-SCNC: 96 MMOL/L — LOW (ref 98–107)
CHLORIDE SERPL-SCNC: 96 MMOL/L — LOW (ref 98–107)
CO2 SERPL-SCNC: 19 MMOL/L — LOW (ref 22–31)
CO2 SERPL-SCNC: 20 MMOL/L — LOW (ref 22–31)
CO2 SERPL-SCNC: 23 MMOL/L — SIGNIFICANT CHANGE UP (ref 22–31)
COLOR SPEC: YELLOW — SIGNIFICANT CHANGE UP
CREAT SERPL-MCNC: 0.52 MG/DL — SIGNIFICANT CHANGE UP (ref 0.5–1.3)
CREAT SERPL-MCNC: 0.61 MG/DL — SIGNIFICANT CHANGE UP (ref 0.5–1.3)
CREAT SERPL-MCNC: 0.61 MG/DL — SIGNIFICANT CHANGE UP (ref 0.5–1.3)
CRP SERPL-MCNC: 22 MG/L — HIGH
DIFF PNL FLD: NEGATIVE — SIGNIFICANT CHANGE UP
EGFR: 116 ML/MIN/1.73M2 — SIGNIFICANT CHANGE UP
EGFR: 116 ML/MIN/1.73M2 — SIGNIFICANT CHANGE UP
EGFR: 121 ML/MIN/1.73M2 — SIGNIFICANT CHANGE UP
EOSINOPHIL # BLD AUTO: 0 K/UL — SIGNIFICANT CHANGE UP (ref 0–0.5)
EOSINOPHIL NFR BLD AUTO: 0 % — SIGNIFICANT CHANGE UP (ref 0–6)
ERYTHROCYTE [SEDIMENTATION RATE] IN BLOOD: 34 MM/HR — HIGH (ref 1–15)
FLUAV AG NPH QL: SIGNIFICANT CHANGE UP
FLUBV AG NPH QL: SIGNIFICANT CHANGE UP
GAS PNL BLDV: SIGNIFICANT CHANGE UP
GLUCOSE BLDC GLUCOMTR-MCNC: 156 MG/DL — HIGH (ref 70–99)
GLUCOSE BLDC GLUCOMTR-MCNC: 185 MG/DL — HIGH (ref 70–99)
GLUCOSE BLDC GLUCOMTR-MCNC: 210 MG/DL — HIGH (ref 70–99)
GLUCOSE BLDC GLUCOMTR-MCNC: 344 MG/DL — HIGH (ref 70–99)
GLUCOSE SERPL-MCNC: 148 MG/DL — HIGH (ref 70–99)
GLUCOSE SERPL-MCNC: 177 MG/DL — HIGH (ref 70–99)
GLUCOSE SERPL-MCNC: 181 MG/DL — HIGH (ref 70–99)
GLUCOSE UR QL: >=1000 MG/DL
HCT VFR BLD CALC: 30.1 % — LOW (ref 39–50)
HGB BLD-MCNC: 9.5 G/DL — LOW (ref 13–17)
IANC: 4.47 K/UL — SIGNIFICANT CHANGE UP (ref 1.8–7.4)
IMM GRANULOCYTES NFR BLD AUTO: 0.2 % — SIGNIFICANT CHANGE UP (ref 0–0.9)
KETONES UR-MCNC: 80 MG/DL
LACTATE SERPL-SCNC: 0.9 MMOL/L — SIGNIFICANT CHANGE UP (ref 0.5–2)
LEUKOCYTE ESTERASE UR-ACNC: NEGATIVE — SIGNIFICANT CHANGE UP
LYMPHOCYTES # BLD AUTO: 0.76 K/UL — LOW (ref 1–3.3)
LYMPHOCYTES # BLD AUTO: 13.2 % — SIGNIFICANT CHANGE UP (ref 13–44)
MAGNESIUM SERPL-MCNC: 1.8 MG/DL — SIGNIFICANT CHANGE UP (ref 1.6–2.6)
MAGNESIUM SERPL-MCNC: 1.8 MG/DL — SIGNIFICANT CHANGE UP (ref 1.6–2.6)
MCHC RBC-ENTMCNC: 27.9 PG — SIGNIFICANT CHANGE UP (ref 27–34)
MCHC RBC-ENTMCNC: 31.6 G/DL — LOW (ref 32–36)
MCV RBC AUTO: 88.5 FL — SIGNIFICANT CHANGE UP (ref 80–100)
MONOCYTES # BLD AUTO: 0.49 K/UL — SIGNIFICANT CHANGE UP (ref 0–0.9)
MONOCYTES NFR BLD AUTO: 8.5 % — SIGNIFICANT CHANGE UP (ref 2–14)
NEUTROPHILS # BLD AUTO: 4.47 K/UL — SIGNIFICANT CHANGE UP (ref 1.8–7.4)
NEUTROPHILS NFR BLD AUTO: 77.9 % — HIGH (ref 43–77)
NITRITE UR-MCNC: NEGATIVE — SIGNIFICANT CHANGE UP
NRBC # BLD: 0 /100 WBCS — SIGNIFICANT CHANGE UP (ref 0–0)
NRBC # FLD: 0 K/UL — SIGNIFICANT CHANGE UP (ref 0–0)
PH UR: 5.5 — SIGNIFICANT CHANGE UP (ref 5–8)
PHOSPHATE SERPL-MCNC: 2.3 MG/DL — LOW (ref 2.5–4.5)
PHOSPHATE SERPL-MCNC: 2.5 MG/DL — SIGNIFICANT CHANGE UP (ref 2.5–4.5)
PLATELET # BLD AUTO: 309 K/UL — SIGNIFICANT CHANGE UP (ref 150–400)
POTASSIUM SERPL-MCNC: 3.6 MMOL/L — SIGNIFICANT CHANGE UP (ref 3.5–5.3)
POTASSIUM SERPL-MCNC: 3.7 MMOL/L — SIGNIFICANT CHANGE UP (ref 3.5–5.3)
POTASSIUM SERPL-MCNC: 3.8 MMOL/L — SIGNIFICANT CHANGE UP (ref 3.5–5.3)
POTASSIUM SERPL-SCNC: 3.6 MMOL/L — SIGNIFICANT CHANGE UP (ref 3.5–5.3)
POTASSIUM SERPL-SCNC: 3.7 MMOL/L — SIGNIFICANT CHANGE UP (ref 3.5–5.3)
POTASSIUM SERPL-SCNC: 3.8 MMOL/L — SIGNIFICANT CHANGE UP (ref 3.5–5.3)
PROT SERPL-MCNC: 5.5 G/DL — LOW (ref 6–8.3)
PROT SERPL-MCNC: 6.2 G/DL — SIGNIFICANT CHANGE UP (ref 6–8.3)
PROT UR-MCNC: 30 MG/DL
RBC # BLD: 3.4 M/UL — LOW (ref 4.2–5.8)
RBC # FLD: 12.7 % — SIGNIFICANT CHANGE UP (ref 10.3–14.5)
RBC CASTS # UR COMP ASSIST: 1 /HPF — SIGNIFICANT CHANGE UP (ref 0–4)
RSV RNA NPH QL NAA+NON-PROBE: SIGNIFICANT CHANGE UP
SARS-COV-2 RNA SPEC QL NAA+PROBE: DETECTED
SODIUM SERPL-SCNC: 136 MMOL/L — SIGNIFICANT CHANGE UP (ref 135–145)
SODIUM SERPL-SCNC: 137 MMOL/L — SIGNIFICANT CHANGE UP (ref 135–145)
SODIUM SERPL-SCNC: 138 MMOL/L — SIGNIFICANT CHANGE UP (ref 135–145)
SP GR SPEC: 1.04 — HIGH (ref 1–1.03)
SQUAMOUS # UR AUTO: 1 /HPF — SIGNIFICANT CHANGE UP (ref 0–5)
TROPONIN T, HIGH SENSITIVITY RESULT: 20 NG/L — SIGNIFICANT CHANGE UP
UROBILINOGEN FLD QL: 1 MG/DL — SIGNIFICANT CHANGE UP (ref 0.2–1)
WBC # BLD: 5.74 K/UL — SIGNIFICANT CHANGE UP (ref 3.8–10.5)
WBC # FLD AUTO: 5.74 K/UL — SIGNIFICANT CHANGE UP (ref 3.8–10.5)
WBC UR QL: 2 /HPF — SIGNIFICANT CHANGE UP (ref 0–5)

## 2024-12-14 PROCEDURE — 93010 ELECTROCARDIOGRAM REPORT: CPT

## 2024-12-14 PROCEDURE — 74177 CT ABD & PELVIS W/CONTRAST: CPT | Mod: 26,MC

## 2024-12-14 PROCEDURE — 99222 1ST HOSP IP/OBS MODERATE 55: CPT

## 2024-12-14 PROCEDURE — 71046 X-RAY EXAM CHEST 2 VIEWS: CPT | Mod: 26

## 2024-12-14 RX ORDER — 0.9 % SODIUM CHLORIDE 0.9 %
1000 INTRAVENOUS SOLUTION INTRAVENOUS
Refills: 0 | Status: DISCONTINUED | OUTPATIENT
Start: 2024-12-14 | End: 2024-12-15

## 2024-12-14 RX ORDER — POTASSIUM CHLORIDE 600 MG/1
20 TABLET, EXTENDED RELEASE ORAL ONCE
Refills: 0 | Status: COMPLETED | OUTPATIENT
Start: 2024-12-14 | End: 2024-12-14

## 2024-12-14 RX ORDER — SODIUM,POTASSIUM PHOSPHATES 278-250MG
1 POWDER IN PACKET (EA) ORAL ONCE
Refills: 0 | Status: COMPLETED | OUTPATIENT
Start: 2024-12-14 | End: 2024-12-14

## 2024-12-14 RX ORDER — CYANOCOBALAMIN/FOLIC AC/VIT B6 1-2.2-25MG
1 TABLET ORAL DAILY
Refills: 0 | Status: DISCONTINUED | OUTPATIENT
Start: 2024-12-14 | End: 2024-12-14

## 2024-12-14 RX ORDER — PANTOPRAZOLE SODIUM 40 MG/1
40 TABLET, DELAYED RELEASE ORAL
Refills: 0 | Status: DISCONTINUED | OUTPATIENT
Start: 2024-12-14 | End: 2024-12-17

## 2024-12-14 RX ORDER — CYANOCOBALAMIN/FOLIC AC/VIT B6 1-2.2-25MG
1 TABLET ORAL DAILY
Refills: 0 | Status: DISCONTINUED | OUTPATIENT
Start: 2024-12-14 | End: 2024-12-19

## 2024-12-14 RX ORDER — INSULIN GLARGINE 100 [IU]/ML
10 INJECTION, SOLUTION SUBCUTANEOUS AT BEDTIME
Refills: 0 | Status: DISCONTINUED | OUTPATIENT
Start: 2024-12-14 | End: 2024-12-14

## 2024-12-14 RX ORDER — INFLUENZA VIRUS VACCINE 15; 15; 15; 15 UG/.5ML; UG/.5ML; UG/.5ML; UG/.5ML
0.5 SUSPENSION INTRAMUSCULAR ONCE
Refills: 0 | Status: DISCONTINUED | OUTPATIENT
Start: 2024-12-14 | End: 2024-12-19

## 2024-12-14 RX ORDER — ACETAMINOPHEN 500MG 500 MG/1
1000 TABLET, COATED ORAL ONCE
Refills: 0 | Status: COMPLETED | OUTPATIENT
Start: 2024-12-14 | End: 2024-12-14

## 2024-12-14 RX ORDER — ACETAMINOPHEN, DIPHENHYDRAMINE HCL, PHENYLEPHRINE HCL 325; 25; 5 MG/1; MG/1; MG/1
3 TABLET ORAL AT BEDTIME
Refills: 0 | Status: DISCONTINUED | OUTPATIENT
Start: 2024-12-14 | End: 2024-12-19

## 2024-12-14 RX ORDER — INSULIN GLARGINE 100 [IU]/ML
10 INJECTION, SOLUTION SUBCUTANEOUS ONCE
Refills: 0 | Status: COMPLETED | OUTPATIENT
Start: 2024-12-14 | End: 2024-12-14

## 2024-12-14 RX ORDER — LANOLIN ALCOHOL/MO/W.PET/CERES
500 CREAM (GRAM) TOPICAL EVERY 8 HOURS
Refills: 0 | Status: COMPLETED | OUTPATIENT
Start: 2024-12-14 | End: 2024-12-17

## 2024-12-14 RX ORDER — 0.9 % SODIUM CHLORIDE 0.9 %
1000 INTRAVENOUS SOLUTION INTRAVENOUS
Refills: 0 | Status: DISCONTINUED | OUTPATIENT
Start: 2024-12-14 | End: 2024-12-19

## 2024-12-14 RX ORDER — FAMOTIDINE 20 MG/1
20 TABLET, FILM COATED ORAL ONCE
Refills: 0 | Status: COMPLETED | OUTPATIENT
Start: 2024-12-14 | End: 2024-12-14

## 2024-12-14 RX ORDER — SODIUM CHLORIDE 9 MG/ML
1000 INJECTION, SOLUTION INTRAMUSCULAR; INTRAVENOUS; SUBCUTANEOUS
Refills: 0 | Status: DISCONTINUED | OUTPATIENT
Start: 2024-12-14 | End: 2024-12-14

## 2024-12-14 RX ORDER — IOHEXOL 300 MG/ML
30 INJECTION, SOLUTION INTRAVENOUS ONCE
Refills: 0 | Status: COMPLETED | OUTPATIENT
Start: 2024-12-14 | End: 2024-12-14

## 2024-12-14 RX ORDER — CHLORHEXIDINE GLUCONATE 1.2 MG/ML
1 RINSE ORAL DAILY
Refills: 0 | Status: DISCONTINUED | OUTPATIENT
Start: 2024-12-14 | End: 2024-12-19

## 2024-12-14 RX ORDER — GLUCAGON INJECTION, SOLUTION 0.5 MG/.1ML
1 INJECTION, SOLUTION SUBCUTANEOUS ONCE
Refills: 0 | Status: DISCONTINUED | OUTPATIENT
Start: 2024-12-14 | End: 2024-12-19

## 2024-12-14 RX ADMIN — ACETAMINOPHEN 500MG 1000 MILLIGRAM(S): 500 TABLET, COATED ORAL at 23:50

## 2024-12-14 RX ADMIN — Medication 105 MILLIGRAM(S): at 13:14

## 2024-12-14 RX ADMIN — INSULIN GLARGINE 10 UNIT(S): 100 INJECTION, SOLUTION SUBCUTANEOUS at 13:16

## 2024-12-14 RX ADMIN — POTASSIUM CHLORIDE 20 MILLIEQUIVALENT(S): 600 TABLET, EXTENDED RELEASE ORAL at 21:59

## 2024-12-14 RX ADMIN — Medication 1: at 18:30

## 2024-12-14 RX ADMIN — ACETAMINOPHEN 500MG 400 MILLIGRAM(S): 500 TABLET, COATED ORAL at 02:00

## 2024-12-14 RX ADMIN — Medication 50 MILLILITER(S): at 17:21

## 2024-12-14 RX ADMIN — Medication 100 GRAM(S): at 22:00

## 2024-12-14 RX ADMIN — ACETAMINOPHEN 500MG 400 MILLIGRAM(S): 500 TABLET, COATED ORAL at 22:50

## 2024-12-14 RX ADMIN — Medication 80 MILLIGRAM(S): at 00:40

## 2024-12-14 RX ADMIN — Medication 2: at 21:58

## 2024-12-14 RX ADMIN — FAMOTIDINE 20 MILLIGRAM(S): 20 TABLET, FILM COATED ORAL at 00:40

## 2024-12-14 RX ADMIN — Medication 1000 MILLILITER(S): at 00:44

## 2024-12-14 RX ADMIN — IOHEXOL 30 MILLILITER(S): 300 INJECTION, SOLUTION INTRAVENOUS at 01:49

## 2024-12-14 RX ADMIN — Medication 1000 MICROGRAM(S): at 15:00

## 2024-12-14 RX ADMIN — Medication 105 MILLIGRAM(S): at 23:26

## 2024-12-14 RX ADMIN — Medication 1 PACKET(S): at 22:00

## 2024-12-14 NOTE — H&P ADULT - ATTENDING COMMENTS
54M PMHx DM (only on Lantus 10 daily but recently non-adherent), hx of gastric bypass 6mo ago in [place], recent Covid diagnosis, p/w 6 days of abd pain/nausea, found to have poorly controlled DM (A1c 12.5%), with hyperglycemia, elevated AG, BHB 6.5, glucosuria and ketonuria as well as non-healing ulcers to bilateral lower extremities, admitted for management of hyperglycemia, endocrinology evaluation and wound care.      #Poorly controlled DM with hyperglycemia and long-term insulin use.   Pt reports non-adherence to home insulin regimen. Recent admission to OSH, treated with fluids and insulin. Labs on presentation w/ pH 7.41, elevated AG, glucose 181, BHB 6.5, A1C 12.5, glucosuria/ketonuria  - f/u endocrinology recommendations  - repeat BMP, VBg q6  - cont. IV fluid hydration with LR    #Non-healing bilateral ulcerations in lower extremities, chronic, non-healing for months. Extremities appear perfused.   Mild warmth surrounding L foot ulceration, otherwise, no active discharge, bleeding, or swelling.   - vasc. eval  - obtain ESR, CRP, Xrays  - f/u blood cx  - monitor off antibiotics    #Abdominal pain, resolved prior to assessment.  - monitor  CTAP IMPRESSION:  1.  Diffuse edematous changes throughout the peritoneal fat and peripheral subcutaneous soft tissues; likely related to an underlying edematous state.  2.  Status post Jennifer-en-Y gastric bypass. No bowel obstruction. No CT evident complications.  3.  Colonic diverticulosis.    #Covid, asymptomatic, afebrile, on RA,  not requiring steroids or remdesevir  - monitor    #DVT ppx - encourage ambulation  Rest of care per Resident's note

## 2024-12-14 NOTE — H&P ADULT - PROBLEM SELECTOR PLAN 2
CT AP w/ oral contrast showed edematous state, s/p Jennifer en Y gastric bypass with no bowel obstruction, colonic diverticulitis  - Surgery consulted and no acute interventions - B/l LE ulcerations chronic. L>R  - never evaluated by podiatry. These ulcers likely in setting of DM, but no sensory loss in LE    Plan:  - F/u blood cultures  - CRP, ESR  - Podiatry consult  - Hold off on antibiotics for now. Will observe

## 2024-12-14 NOTE — ED ADULT NURSE NOTE - CHIEF COMPLAINT QUOTE
pt c/o diffuse abd pain, loss of appetite x 2 days. pt discharged from [facility] today for same complaint. ulcers noted to BLLE. pt denies chest pain, shortness of breath, nausea, vomiting, diarrhea, fever. hx: DM2, [place] 6[place]

## 2024-12-14 NOTE — H&P ADULT - HISTORY OF PRESENT ILLNESS
53 y/o M w/ pmh DM, gastric bypass who presented with abd pain and nausea for the last 2 days.     Denies fevers, chills, SOB, N/V or dyuria.       At ED,     CT AP w/ oral contrast showed anasarca, s/p Jennifer en Y gastric bypass with no bowel obstruction, colonic diverticulitis, CXR wnl 55 y/o M w/ pmh T2DM, gastric bypass (in [place] 04/24)  who presented with abd pain and nausea for the last 2 days.     He states 5 days ago he started having lethargy, abdominal pain with nausea and chills. Denies diarrhea or constipation and is having regular BM. Around this time he was also going back and forth to [place] so was not home in time to take insulin, while not having much oral intake. 2 days ago he presented to [facility] with these symptoms and was told he has a fever with covid and his BS was in the 400s. He received fluids and insulin and discharged from the ED the following day. The past 2 days his symptoms worsened and he had no food intake with increasing lethargy and more chills. He continued not using his insulin during this time and presented to [facility] for further management. Of note he endorses following up with PCP at [facility] regularly and he also endorses getting a gastric bypass surgery in [place] on 04/24 for his diabetes.       At ED, afebrile, hemodynamically stable and on RA. Labs were significant for Hg 9.5, no leukocytosis, , grossly normal electrotypes  SCr 0.61, HCO3 22, BHB 6.5, A1c 12.5%, pH 7.41, lactate 1.2. CT AP w/ oral contrast showed edematous state, s/p Jennifer en Y gastric bypass with no bowel obstruction, colonic diverticulitis, CXR wnl. Patient was covid positive. Given LR 1L bolus, lantus 10u, thiamine, Vitamin B12. Surgery consulted for abdominal pain and ni acute interventions as no gastric BP complications.  53 y/o M w/ pmh T2DM, gastric bypass (in [place] 04/24)  who presented with abd pain and nausea for the last 2 days.     He states 5 days ago he started having lethargy, burning abdominal pain with nausea and chills. Denies diarrhea or constipation and is having regular BM. Around this time he was also going back and forth to [place] so was not home in time to take insulin, while not having much oral intake. 2 days ago he presented to [facility] with these symptoms and was told he has a fever with covid and his BS was in the 400s. He received fluids and insulin and discharged from the ED the following day. The past 2 days his symptoms worsened and he had no food intake with increasing lethargy and more chills. He continued not using his insulin during this time and presented to [facility] for further management. Of note he endorses following up with PCP at [facility] regularly and he also endorses getting a gastric bypass surgery in [place] on 04/24 for his diabetes. States he takes Lantus 10u and Glipizide "25mg".       At ED, afebrile, hemodynamically stable and on RA. Labs were significant for Hg 9.5, no leukocytosis, , grossly normal electrotypes  SCr 0.61, HCO3 22, BHB 6.5, A1c 12.5%, pH 7.41, lactate 1.2. UA positive for ketones and glucose. CT AP w/ oral contrast showed edematous state, s/p Jennifer en Y gastric bypass with no bowel obstruction, colonic diverticulitis, CXR wnl. Patient was covid positive. Given LR 1L bolus, lantus 10u, thiamine, Vitamin B12. Surgery consulted for abdominal pain and ni acute interventions as no gastric BP complications.  55 y/o M w/ pmh T2DM on lantus, gastric bypass (in [place] 05/24)  who presented with abd pain and nausea for the last 2 days.     He states 5 days ago he started having lethargy, burning abdominal pain with nausea and chills. Denies diarrhea or constipation and is having regular BM. Around this time he was also going back and forth to [place] so was not home in time to take insulin, while not having much oral intake. 2 days ago he presented to [facility] with these symptoms and was told he has a fever with covid and his BS was in the 400s. He received fluids and insulin and discharged from the ED the following day. The past 2 days his symptoms worsened and he had no food intake with increasing lethargy and more chills. He continued not using his insulin during this time and presented to [facility] for further management. Of note he endorses following up with PCP at [facility] regularly and he also endorses getting a gastric bypass surgery in [place] on 05/24 for his diabetes. States he takes Lantus 10u and Glipizide "25mg".       At ED, afebrile, hemodynamically stable and on RA. Labs were significant for Hg 9.5, no leukocytosis, , grossly normal electrotypes  SCr 0.61, HCO3 22, BHB 6.5, A1c 12.5%, pH 7.41, lactate 1.2. UA positive for ketones and glucose. CT AP w/ oral contrast showed edematous state, s/p Jennifer en Y gastric bypass with no bowel obstruction, colonic diverticulitis, CXR wnl. Patient was covid positive. Given LR 1L bolus, lantus 10u, thiamine, Vitamin B12. Surgery consulted for abdominal pain and ni acute interventions as no gastric BP complications.

## 2024-12-14 NOTE — H&P ADULT - PROBLEM SELECTOR PLAN 1
- s/p 1L NS and 10u Lantus in ED  - On admission BS not elevated but HCO3 22, BHB 6.5, A1c 12.5%, pH 7.41, lactate 1.2. UA positive for ketones and glucose  - Abdominal pain now improved but initially burning in nature    Plan:  - Started on NS 50mL/hr for 12 hours  - admelog 3u premeal per endo with insulin scales  - Hold lantus for now. Plan to start tomorrow  - CC diet  - Endocrine consulted - s/p 1L NS and 10u Lantus in ED  - On admission BS not elevated but HCO3 22, BHB 6.5, A1c 12.5%, pH 7.41, lactate 1.2. UA positive for ketones and glucose  - Abdominal pain now improved but initially burning in nature    Plan:  - Started on LR 50mL/hr for 12 hours  - Endocrinology consulted.   - admelog 3u premeal per endo with insulin scales  - Hold lantus for now. Plan to start tomorrow  - CC diet, PPI

## 2024-12-14 NOTE — PATIENT PROFILE ADULT - IS THERE A SUSPICION OF ABUSE/NEGLIGENCE?
[FreeTextEntry2] : This note was written by ANGELA WASHINGTON on 06/09/2021, acting solely as a scribe for Dr. Raina Stephens MD. \par \par All medical record entries made by the scribe, ANGELA WASHINGTON, were at my, Dr. Eloisa Phillips MD, direction and personally dictated by me on 06/09/2021. I have personally reviewed the chart and agree that the record accurately reflects my personal performance and care. 
no

## 2024-12-14 NOTE — H&P ADULT - PROBLEM SELECTOR PLAN 4
- Fluids: None  - Electrolytes: Will replete to maintain K>4, Phos>3, and Mag>2  - Nutrition: CC diet  - Activity: As tolerated  - DVT Prophylaxis: None  - Stress Ulcer/GI Prophylaxis: PPI  - Disposition: Admit for management Covid + by RVP  xray clear and patient on RA    Plan:  - Supportive treatment  - isolation

## 2024-12-14 NOTE — H&P ADULT - PROBLEM SELECTOR PLAN 5
- Fluids: LR  - Electrolytes: Will replete to maintain K>4, Phos>3, and Mag>2  - Nutrition: CC diet  - Activity: As tolerated  - DVT Prophylaxis: None  - Stress Ulcer/GI Prophylaxis: PPI  - Disposition: Admit for management

## 2024-12-14 NOTE — ED PROVIDER NOTE - CLINICAL SUMMARY MEDICAL DECISION MAKING FREE TEXT BOX
53 y/o M with PMHx DM, gastric bypass presenting with burning abdominal pain and nausea for the past two days. Concern for gastric bypass complications, pancreatitis, gastritis, gallbladder pathology, DKA. Will obtain CT abd/pelvis with oral and IV contrast, cbc, cmp, lipase, dka labs. Will give GI cocktail, fluids and Tylenol as pt febrile. Surgery consulted for gastric bypass pt with abdominal pain. Dipso pending labs and imaging

## 2024-12-14 NOTE — PATIENT PROFILE ADULT - VISION (WITH CORRECTIVE LENSES IF THE PATIENT USUALLY WEARS THEM):
Normal vision: sees adequately in most situations; can see medication labels, newsprint breastfeeding exclusively

## 2024-12-14 NOTE — H&P ADULT - NSHPPHYSICALEXAM_GEN_ALL_CORE
VITALS:   T(C): 37.1 (12-14-24 @ 11:05), Max: 37.9 (12-14-24 @ 00:53)  HR: 69 (12-14-24 @ 11:05) (67 - 88)  BP: 168/76 (12-14-24 @ 11:05) (136/76 - 168/76)  RR: 18 (12-14-24 @ 11:05) (17 - 18)  SpO2: 100% (12-14-24 @ 11:05) (98% - 100%)    GENERAL: NAD, lying in bed comfortably  HEAD:  Atraumatic, normocephalic  EYES: EOMI, PERRLA, conjunctiva and sclera clear  ENT: Moist mucous membranes  NECK: Supple, no JVD  HEART: Regular rate and rhythm, no murmurs, rubs, or gallops  LUNGS: Unlabored respirations.  Clear to auscultation bilaterally, no crackles, wheezing, or rhonchi  ABDOMEN: Soft, nontender, nondistended, +BS  EXTREMITIES: 2+ peripheral pulses bilaterally. No clubbing, cyanosis, or edema  NERVOUS SYSTEM:  A&Ox3, no focal deficits   SKIN: No rashes or lesions VITALS:   T(C): 37.1 (12-14-24 @ 11:05), Max: 37.9 (12-14-24 @ 00:53)  HR: 69 (12-14-24 @ 11:05) (67 - 88)  BP: 168/76 (12-14-24 @ 11:05) (136/76 - 168/76)  RR: 18 (12-14-24 @ 11:05) (17 - 18)  SpO2: 100% (12-14-24 @ 11:05) (98% - 100%)    GENERAL: NAD, lying in bed comfortably  HEAD:  Atraumatic, normocephalic  EYES: EOMI, PERRLA, conjunctiva and sclera clear  ENT: Moist mucous membranes  NECK: Supple, no JVD  HEART: Regular rate and rhythm, no murmurs, rubs, or gallops  LUNGS: Unlabored respirations.  Clear to auscultation bilaterally, no crackles, wheezing, or rhonchi  ABDOMEN: Soft, nontender, nondistended, +BS  EXTREMITIES: 2+ peripheral pulses bilaterally. No clubbing, cyanosis, or edema  NERVOUS SYSTEM:  A&Ox3, no focal deficits   SKIN: B/l LE injuries that are erythematous and tender to palpation. No active bleeding

## 2024-12-14 NOTE — H&P ADULT - TIME BILLING
The necessity of the time spent during the encounter on this date of service was due to:     Time spent reviewing the chart documentation, reviewing labs and imaging studies, evaluating the patient, discussing the plan of care with the consultants & medical team, and documenting.

## 2024-12-14 NOTE — H&P ADULT - PROBLEM SELECTOR PLAN 3
Covid + by RVP  xray clear and patient on RA    Plan:  - Supportive treatment  - isolation CT AP w/ oral contrast showed edematous state, s/p Jennifer en Y gastric bypass with no bowel obstruction, colonic diverticulitis  - Surgery consulted and no acute interventions

## 2024-12-14 NOTE — H&P ADULT - NSHPLABSRESULTS_GEN_ALL_CORE
LABS:                        9.5    5.74  )-----------( 309      ( 14 Dec 2024 00:48 )             30.1     12-14    136  |  100  |  9   ----------------------------<  148[H]  3.6   |  19[L]  |  0.52    Ca    8.0[L]      14 Dec 2024 05:17  Phos  2.3     12-14  Mg     1.80     12-14    TPro  5.5[L]  /  Alb  2.7[L]  /  TBili  <0.2  /  DBili  x   /  AST  13  /  ALT  <5  /  AlkPhos  75  12-14          Urinalysis Basic - ( 14 Dec 2024 05:17 )    Color: x / Appearance: x / SG: x / pH: x  Gluc: 148 mg/dL / Ketone: x  / Bili: x / Urobili: x   Blood: x / Protein: x / Nitrite: x   Leuk Esterase: x / RBC: x / WBC x   Sq Epi: x / Non Sq Epi: x / Bacteria: x          ADDITIONAL TESTS & IMAGING  RADIOLOGY:    Radiology Reviewed

## 2024-12-14 NOTE — ED PROVIDER NOTE - OBJECTIVE STATEMENT
53 y/o M with PMHx DM, gastric bypass presenting with burning abdominal pain and nausea for the past two days. Pt denies fever, chest pain, sob, vomiting, diarrhea, dysuria. Pt was seen at [facility] where no imaging was performed. 53 y/o M with PMHx DM, gastric bypass presenting with burning abdominal pain and nausea for the past two days. Pt denies fever, chest pain, sob, vomiting, diarrhea, dysuria. Pt was seen at [facility] where no imaging was performed. Pt's gastric bypass was performed in [place] 6 months ago.

## 2024-12-14 NOTE — PATIENT PROFILE ADULT - MONEY FOR FOOD
Additional Note: Benign Genetic Growths Detail Level: Zone Hide Include Location In Plan Question?: No no

## 2024-12-14 NOTE — ED ADULT NURSE REASSESSMENT NOTE - NS ED NURSE REASSESS COMMENT FT1
report received from night RN. Pt awake and alert, A&OX4, resp even and unlabored. pt states that his pain is better than when he first came. Denies CP, SOB, HA, dizziness, palpitations, blurry vision. Resting comfortably.
received report from JUAN CARLOS Guerra, pt A&Ox4 resting in stretcher. respirations even and unlabored. diabetic dry ulcers noted BLLE. medicated per orders. pending transport to bed assignment. no acute distress noted at this time. Plan of care continued.

## 2024-12-14 NOTE — ED PROVIDER NOTE - PROGRESS NOTE DETAILS
Surgery consulted for gastric bypass pt with fever, abdominal pain and diffuse edematous peritoneal fat on CT scan. Gastric bypass intact. Surgery will come to evaluate the pt at bedside.   Corazon Wilcox PGY1 Carmen Bernard D.O.  EM PGY-1: Patient signed out to me. EKG with TWI in inferior/lateral leads with no prior for comparison, trop pending. Labs notable for A1C of 12, betahydroxy elevated, gluc 148, gap 17, will initiate insulin. CTAP with diffuse edematous changes however no evidence of complications from bypass. Surgery at bedside, pending official recs.  Plan for admission to med vs surgery. Carmen Bernard D.O.  EM PGY-1: Pt reassessed, feeling improvement of nausea s/p meds, attempting PO chall. No acute surgical intervention per surgery recs. Home DM regimen of glipizide and 10U lantus, pt states last insulin 6pm yesterday. Will order insulin in ED. Plan for admit to medicine for glucose optimization. Pt aware of plan and agreeable. Carmen Bernard D.O.  EM PGY-1: Pt reassessed, feeling improvement of nausea s/p meds, attempting PO chall. No acute surgical intervention per surgery recs. Home DM regimen of glipizide and 10U lantus, pt states last insulin 6pm yesterday. Will see if pt can tolerate PO and then order insulin. Plan for admit to medicine for glucose optimization. Pt aware of plan and agreeable.

## 2024-12-14 NOTE — H&P ADULT - ASSESSMENT
55 y/o M w/ pmh T2DM, gastric bypass who presented with abd pain and nausea for the last 2 days. Admitted for likely starvation ketoacidosis     55 y/o M w/ pmh T2DM, gastric bypass who presented with abd pain and nausea for the last 2 days. Admitted for likely starvation ketosis.

## 2024-12-14 NOTE — ED PROVIDER NOTE - ATTENDING CONTRIBUTION TO CARE
Patient is a 54-year-old male with a history of type 2 diabetes mellitus on glimepiride and insulin, s/p gastric bypass surgery in [facility] about 6 months ago here for burning abdominal pain x 2 days.  Patient states he went to [facility] and had blood work done and he was treated symptomatically.  He states he is left the hospital still with pain so came here for evaluation.  He denies any chest pain or shortness of breath.  No fevers, vomiting.  No urinary symptoms.      VS noted  Gen. no acute distress, Non toxic   HEENT: EOMI, mmm  Lungs: CTAB/L no C/ W /R   CVS: RRR   Abd; Soft non tender, non distended, no CVA tenderness bilaterally  Ext: no edema, chronic ulcers to bilateral lower extremities, ulcer present on dorsal surface of left foot, no pus, erythema or drainage, no warmth  Skin: no rash  Neuro AAOx3 non focal clear speech  a/p:  abdominal pain–patient has a history of gastric bypass 6 months ago.  He states that he has had burning pain for about 2 days.  He has nausea but no vomiting.  Plan for labs, CT abdomen pelvis, GI meds and reassess.  Patient will need surgery consult secondary to history of gastric bypass surgery.  - Isa URIBE

## 2024-12-14 NOTE — CONSULT NOTE ADULT - SUBJECTIVE AND OBJECTIVE BOX
*** SURGERY CONSULT NOTE    Consulting Team: Bariatric Surgery     Patient: REYES, YOVANNY , 54y ([birth year])Male   MRN: [id number]  Location: [facility] ED  Visit: 12-13-24 Emergency  Date: 12-14-24 @ 08:08      HPI: 54M with PMHx DM (on insulin), gastric bypass 6mo ago in [place] presenting with burning abdominal pain and nausea for the past two days. Pt otherwise denies subjective fever/chills, chest pain, sob, vomiting, diarrhea, dysuria. Patient is having bowel function. Pt was seen at [facility] where no imaging was performed. In the ED AVSS, labs notable for elevated AG, glucose 181, BHB 6.5, glucosuria and A1c 12.5, and CT performed without acute findings of bypass complications or obstruction.       PAST MEDICAL HISTORY:  Diabetes type 2, controlled        PAST SURGICAL HISTORY:  No significant past surgical history        MEDICATIONS:      ALLERGIES:  No Known Allergies      VITALS & I/Os:  Vital Signs Last 24 Hrs  T(C): 37.2 (14 Dec 2024 07:42), Max: 37.9 (14 Dec 2024 00:53)  T(F): 98.9 (14 Dec 2024 07:42), Max: 100.2 (14 Dec 2024 00:53)  HR: 69 (14 Dec 2024 07:42) (67 - 74)  BP: 151/60 (14 Dec 2024 07:42) (136/76 - 156/79)  BP(mean): 91 (14 Dec 2024 04:24) (91 - 91)  RR: 17 (14 Dec 2024 07:42) (17 - 18)  SpO2: 99% (14 Dec 2024 07:42) (98% - 99%)    Parameters below as of 14 Dec 2024 07:42  Patient On (Oxygen Delivery Method): room air        I&O's Summary      PHYSICAL EXAM:  General: No acute distress  Respiratory: Nonlabored  Cardiovascular: RRR  Abdominal: Soft, nondistended, nontender. No rebound or guarding. No organomegaly, no palpable mass.  Extremities: Warm    LABS:                        9.5    5.74  )-----------( 309      ( 14 Dec 2024 00:48 )             30.1     12-14    136  |  100  |  9   ----------------------------<  148[H]  3.6   |  19[L]  |  0.52    Ca    8.0[L]      14 Dec 2024 05:17  Phos  2.3     12-14  Mg     1.80     12-14    TPro  5.5[L]  /  Alb  2.7[L]  /  TBili  <0.2  /  DBili  x   /  AST  13  /  ALT  <5  /  AlkPhos  75  12-14    Lactate: Lactate, Blood: 0.9 mmol/L (12-14 @ 00:48)   12-14 @ 00:48  1.2              Urinalysis Basic - ( 14 Dec 2024 05:17 )    Color: x / Appearance: x / SG: x / pH: x  Gluc: 148 mg/dL / Ketone: x  / Bili: x / Urobili: x   Blood: x / Protein: x / Nitrite: x   Leuk Esterase: x / RBC: x / WBC x   Sq Epi: x / Non Sq Epi: x / Bacteria: x          IMAGING:  FINDINGS:  LOWER CHEST: Coronary artery calcifications. Small right pleural effusion.    LIVER: Within normal limits.  BILE DUCTS: Normal caliber.  GALLBLADDER: Within normal limits.  SPLEEN: Within normal limits.  PANCREAS: Within normal limits.  ADRENALS: Within normal limits.  KIDNEYS/URETERS: Within normal limits.    BLADDER: Circumferential wall thickening.  REPRODUCTIVE ORGANS: Prostate is enlarged.    BOWEL: Status post Jennifer-en-Y gastric bypass. No bowel obstruction.   Appendix is normal. Colonic diverticulosis.  PERITONEUM/RETROPERITONEUM: Diffuse edema throughout the entirety of the   peritoneal fat and peripheral subcutaneous soft tissues; consistent with   an underlying edematous state. Small volume (trace) ascites.  VESSELS: Atherosclerotic changes.  LYMPH NODES: No lymphadenopathy.  ABDOMINAL WALL: Within normal limits.  BONES: Within normal limits.    IMPRESSION:  1.  Diffuse edematous changes throughout the peritoneal fat and   peripheral subcutaneous soft tissues; likely related to an underlying   edematous state.  2.  Status post Jennifer-en-Y gastric bypass. No bowel obstruction. No CT   evident complications.  3.  Colonic diverticulosis.    --- End of Report ---

## 2024-12-14 NOTE — ED ADULT NURSE NOTE - OBJECTIVE STATEMENT
pt c/o diffuse abd pain, loss of appetite x 2 days. pt discharged from [facility] today for same complaint. ulcers noted to BLLE. pt denies chest pain, shortness of breath, nausea, vomiting, diarrhea, fever. hx: DM2, [place]  patient laying in semi fowlers position on the stretcher. patient alert and oriented times four. patient denies shortness of breath, chest pain, nausea, vomiting, chill, fever. Patient normal sinus on the monitor. Respirations equal and adequate. Patients 20 gauge left arm IV patent, no signs of infiltration. Safety measures in place, call bell within reach. Patient stable upon leaving the room.

## 2024-12-14 NOTE — PATIENT PROFILE ADULT - FALL HARM RISK - FALL HARM RISK
Depo Provera injection given. Patient aware to return to clinic in AUG 1 - 15 for next injection.. Patient tolerated without incident.       
Depo Provera injection given. Patient aware to return to clinic in AUGUST 1 - 15 for next injection.. Patient tolerated without incident.       
No indicators present

## 2024-12-14 NOTE — CONSULT NOTE ADULT - ASSESSMENT
54M with PMHx DM (on insulin), gastric bypass 6mo ago in [place] presenting with burning abdominal pain and nausea for the past two days with labs c/w DKA > elevated AG, glucose 181, BHB 6.5, glucosuria/ketonuria. Surgery consulted given recent bariatric hx and adnominal pain.     Rec  - no acute surgical intervention, physical exam and imaging findings without c/f for gastric BP complications  - abd pain and nausea c/w DKA picture > rec med admission for IVF resus and resolution of serum ketones  - Endocrine consult for blood glucose optimization > A1c in ED 12.5 on reported DM regimen of glipizide and 10U lantus  - diet as tolerated  - plan discussed with Dr. Louie Kapoor per ED      B Team Surgery [id number]  Glenn Harding MD (PGY2)

## 2024-12-14 NOTE — ED PROVIDER NOTE - PHYSICAL EXAMINATION
GENERAL: no acute distress, non-toxic appearing  HEENT: normal conjunctiva, oral mucosa moist  CARDIAC: regular rate and regular rhythm  PULM: clear to ascultation bilaterally, no appreciable crackles, rales, rhonchi, or wheezing, no increased work of breathing  GI: abdomen nondistended, soft, nontender  : no CVA tenderness, no suprapubic tenderness  NEURO: alert and oriented x 3, normal speech, moving all extremities without lateralization  MSK: no visible deformities, no peripheral edema, calf tenderness/redness/swelling  SKIN: no visible rashes GENERAL: no acute distress, non-toxic appearing  HEENT: normal conjunctiva, oral mucosa moist  CARDIAC: regular rate and regular rhythm  PULM: clear to ascultation bilaterally, no appreciable crackles, rales, rhonchi, or wheezing, no increased work of breathing  GI: abdomen nondistended, soft, nontender  : no CVA tenderness, no suprapubic tenderness

## 2024-12-15 LAB
ANION GAP SERPL CALC-SCNC: 20 MMOL/L — HIGH (ref 7–14)
ANION GAP SERPL CALC-SCNC: 21 MMOL/L — HIGH (ref 7–14)
B-OH-BUTYR SERPL-SCNC: 6.3 MMOL/L — HIGH (ref 0–0.4)
B-OH-BUTYR SERPL-SCNC: 7.3 MMOL/L — HIGH (ref 0–0.4)
BUN SERPL-MCNC: 10 MG/DL — SIGNIFICANT CHANGE UP (ref 7–23)
BUN SERPL-MCNC: 11 MG/DL — SIGNIFICANT CHANGE UP (ref 7–23)
CALCIUM SERPL-MCNC: 9 MG/DL — SIGNIFICANT CHANGE UP (ref 8.4–10.5)
CALCIUM SERPL-MCNC: 9 MG/DL — SIGNIFICANT CHANGE UP (ref 8.4–10.5)
CHLORIDE SERPL-SCNC: 95 MMOL/L — LOW (ref 98–107)
CHLORIDE SERPL-SCNC: 96 MMOL/L — LOW (ref 98–107)
CO2 SERPL-SCNC: 22 MMOL/L — SIGNIFICANT CHANGE UP (ref 22–31)
CO2 SERPL-SCNC: 22 MMOL/L — SIGNIFICANT CHANGE UP (ref 22–31)
CREAT SERPL-MCNC: 0.68 MG/DL — SIGNIFICANT CHANGE UP (ref 0.5–1.3)
CREAT SERPL-MCNC: 0.76 MG/DL — SIGNIFICANT CHANGE UP (ref 0.5–1.3)
EGFR: 108 ML/MIN/1.73M2 — SIGNIFICANT CHANGE UP
EGFR: 112 ML/MIN/1.73M2 — SIGNIFICANT CHANGE UP
GAS PNL BLDV: SIGNIFICANT CHANGE UP
GLUCOSE BLDC GLUCOMTR-MCNC: 176 MG/DL — HIGH (ref 70–99)
GLUCOSE BLDC GLUCOMTR-MCNC: 217 MG/DL — HIGH (ref 70–99)
GLUCOSE BLDC GLUCOMTR-MCNC: 237 MG/DL — HIGH (ref 70–99)
GLUCOSE BLDC GLUCOMTR-MCNC: 237 MG/DL — HIGH (ref 70–99)
GLUCOSE BLDC GLUCOMTR-MCNC: 239 MG/DL — HIGH (ref 70–99)
GLUCOSE SERPL-MCNC: 172 MG/DL — HIGH (ref 70–99)
GLUCOSE SERPL-MCNC: 222 MG/DL — HIGH (ref 70–99)
HCT VFR BLD CALC: 29 % — LOW (ref 39–50)
HGB BLD-MCNC: 9.4 G/DL — LOW (ref 13–17)
MAGNESIUM SERPL-MCNC: 2 MG/DL — SIGNIFICANT CHANGE UP (ref 1.6–2.6)
MCHC RBC-ENTMCNC: 28.2 PG — SIGNIFICANT CHANGE UP (ref 27–34)
MCHC RBC-ENTMCNC: 32.4 G/DL — SIGNIFICANT CHANGE UP (ref 32–36)
MCV RBC AUTO: 87.1 FL — SIGNIFICANT CHANGE UP (ref 80–100)
MRSA PCR RESULT.: SIGNIFICANT CHANGE UP
NRBC # BLD: 0 /100 WBCS — SIGNIFICANT CHANGE UP (ref 0–0)
NRBC # FLD: 0 K/UL — SIGNIFICANT CHANGE UP (ref 0–0)
PHOSPHATE SERPL-MCNC: 2.8 MG/DL — SIGNIFICANT CHANGE UP (ref 2.5–4.5)
PLATELET # BLD AUTO: 291 K/UL — SIGNIFICANT CHANGE UP (ref 150–400)
POTASSIUM SERPL-MCNC: 4 MMOL/L — SIGNIFICANT CHANGE UP (ref 3.5–5.3)
POTASSIUM SERPL-MCNC: 4.1 MMOL/L — SIGNIFICANT CHANGE UP (ref 3.5–5.3)
POTASSIUM SERPL-SCNC: 4 MMOL/L — SIGNIFICANT CHANGE UP (ref 3.5–5.3)
POTASSIUM SERPL-SCNC: 4.1 MMOL/L — SIGNIFICANT CHANGE UP (ref 3.5–5.3)
RBC # BLD: 3.33 M/UL — LOW (ref 4.2–5.8)
RBC # FLD: 12.4 % — SIGNIFICANT CHANGE UP (ref 10.3–14.5)
S AUREUS DNA NOSE QL NAA+PROBE: DETECTED
SODIUM SERPL-SCNC: 137 MMOL/L — SIGNIFICANT CHANGE UP (ref 135–145)
SODIUM SERPL-SCNC: 139 MMOL/L — SIGNIFICANT CHANGE UP (ref 135–145)
WBC # BLD: 5.6 K/UL — SIGNIFICANT CHANGE UP (ref 3.8–10.5)
WBC # FLD AUTO: 5.6 K/UL — SIGNIFICANT CHANGE UP (ref 3.8–10.5)

## 2024-12-15 PROCEDURE — 99254 IP/OBS CNSLTJ NEW/EST MOD 60: CPT

## 2024-12-15 PROCEDURE — 99233 SBSQ HOSP IP/OBS HIGH 50: CPT | Mod: GC

## 2024-12-15 PROCEDURE — 99222 1ST HOSP IP/OBS MODERATE 55: CPT

## 2024-12-15 RX ORDER — ONDANSETRON HYDROCHLORIDE 4 MG/1
4 TABLET, FILM COATED ORAL ONCE
Refills: 0 | Status: COMPLETED | OUTPATIENT
Start: 2024-12-15 | End: 2024-12-15

## 2024-12-15 RX ORDER — INSULIN GLARGINE 100 [IU]/ML
8 INJECTION, SOLUTION SUBCUTANEOUS AT BEDTIME
Refills: 0 | Status: DISCONTINUED | OUTPATIENT
Start: 2024-12-15 | End: 2024-12-16

## 2024-12-15 RX ORDER — 0.9 % SODIUM CHLORIDE 0.9 %
1000 INTRAVENOUS SOLUTION INTRAVENOUS
Refills: 0 | Status: DISCONTINUED | OUTPATIENT
Start: 2024-12-15 | End: 2024-12-15

## 2024-12-15 RX ORDER — ACETAMINOPHEN 500MG 500 MG/1
1000 TABLET, COATED ORAL ONCE
Refills: 0 | Status: COMPLETED | OUTPATIENT
Start: 2024-12-15 | End: 2024-12-15

## 2024-12-15 RX ORDER — INSULIN GLARGINE 100 [IU]/ML
10 INJECTION, SOLUTION SUBCUTANEOUS AT BEDTIME
Refills: 0 | Status: DISCONTINUED | OUTPATIENT
Start: 2024-12-15 | End: 2024-12-15

## 2024-12-15 RX ORDER — 0.9 % SODIUM CHLORIDE 0.9 %
1000 INTRAVENOUS SOLUTION INTRAVENOUS
Refills: 0 | Status: DISCONTINUED | OUTPATIENT
Start: 2024-12-15 | End: 2024-12-16

## 2024-12-15 RX ORDER — ACETAMINOPHEN 500MG 500 MG/1
650 TABLET, COATED ORAL EVERY 6 HOURS
Refills: 0 | Status: DISCONTINUED | OUTPATIENT
Start: 2024-12-15 | End: 2024-12-19

## 2024-12-15 RX ADMIN — Medication 2: at 18:19

## 2024-12-15 RX ADMIN — CHLORHEXIDINE GLUCONATE 1 APPLICATION(S): 1.2 RINSE ORAL at 11:34

## 2024-12-15 RX ADMIN — Medication 105 MILLIGRAM(S): at 13:33

## 2024-12-15 RX ADMIN — Medication 2 MILLIGRAM(S): at 19:20

## 2024-12-15 RX ADMIN — Medication 2 MILLIGRAM(S): at 02:45

## 2024-12-15 RX ADMIN — PANTOPRAZOLE SODIUM 40 MILLIGRAM(S): 40 TABLET, DELAYED RELEASE ORAL at 05:43

## 2024-12-15 RX ADMIN — Medication 3 UNIT(S): at 12:41

## 2024-12-15 RX ADMIN — Medication 2: at 08:51

## 2024-12-15 RX ADMIN — Medication 105 MILLIGRAM(S): at 05:42

## 2024-12-15 RX ADMIN — Medication 1: at 12:40

## 2024-12-15 RX ADMIN — FAMOTIDINE 20 MILLIGRAM(S): 20 TABLET, FILM COATED ORAL at 01:00

## 2024-12-15 RX ADMIN — Medication 3 UNIT(S): at 08:51

## 2024-12-15 RX ADMIN — Medication 3 UNIT(S): at 18:20

## 2024-12-15 RX ADMIN — Medication 1 TABLET(S): at 11:34

## 2024-12-15 RX ADMIN — Medication 105 MILLIGRAM(S): at 22:32

## 2024-12-15 RX ADMIN — Medication 80 MILLILITER(S): at 14:31

## 2024-12-15 RX ADMIN — ONDANSETRON HYDROCHLORIDE 4 MILLIGRAM(S): 4 TABLET, FILM COATED ORAL at 01:45

## 2024-12-15 RX ADMIN — Medication 80 MILLILITER(S): at 14:59

## 2024-12-15 RX ADMIN — ACETAMINOPHEN 500MG 1000 MILLIGRAM(S): 500 TABLET, COATED ORAL at 17:08

## 2024-12-15 RX ADMIN — ACETAMINOPHEN 500MG 400 MILLIGRAM(S): 500 TABLET, COATED ORAL at 16:08

## 2024-12-15 RX ADMIN — Medication 2 MILLIGRAM(S): at 01:45

## 2024-12-15 RX ADMIN — Medication 2 MILLIGRAM(S): at 20:20

## 2024-12-15 NOTE — PROGRESS NOTE ADULT - PROBLEM SELECTOR PLAN 3
CT AP w/ oral contrast showed edematous state, s/p Jennifer en Y gastric bypass with no bowel obstruction, colonic diverticulitis  - Surgery consulted and no acute interventions

## 2024-12-15 NOTE — PROGRESS NOTE ADULT - SUBJECTIVE AND OBJECTIVE BOX
Karthik Garibay M.D  Resident  Department of Medicine  Available on [employer] Teams    ***********************************************************************  Patient is a 54y old  Male who presents with a chief complaint of Abdominal pain (14 Dec 2024 13:44)      SUBJECTIVE / OVERNIGHT EVENTS: Patient seen and examined at bedside.     ADDITIONAL REVIEW OF SYSTEMS:    MEDICATIONS  (STANDING):  chlorhexidine 2% Cloths 1 Application(s) Topical daily  dextrose 5%. 1000 milliLiter(s) (50 mL/Hr) IV Continuous <Continuous>  dextrose 5%. 1000 milliLiter(s) (100 mL/Hr) IV Continuous <Continuous>  dextrose 50% Injectable 25 Gram(s) IV Push once  dextrose 50% Injectable 12.5 Gram(s) IV Push once  dextrose 50% Injectable 25 Gram(s) IV Push once  glucagon  Injectable 1 milliGRAM(s) IntraMuscular once  influenza   Vaccine 0.5 milliLiter(s) IntraMuscular once  insulin lispro (ADMELOG) corrective regimen sliding scale   SubCutaneous three times a day before meals  insulin lispro (ADMELOG) corrective regimen sliding scale   SubCutaneous at bedtime  insulin lispro Injectable (ADMELOG) 3 Unit(s) SubCutaneous three times a day before meals  lactated ringers. 1000 milliLiter(s) (50 mL/Hr) IV Continuous <Continuous>  multivitamin 1 Tablet(s) Oral daily  pantoprazole    Tablet 40 milliGRAM(s) Oral before breakfast  thiamine IVPB 500 milliGRAM(s) IV Intermittent every 8 hours    MEDICATIONS  (PRN):  dextrose Oral Gel 15 Gram(s) Oral once PRN Blood Glucose LESS THAN 70 milliGRAM(s)/deciliter  melatonin 3 milliGRAM(s) Oral at bedtime PRN Insomnia      CAPILLARY BLOOD GLUCOSE      POCT Blood Glucose.: 344 mg/dL (14 Dec 2024 21:51)  POCT Blood Glucose.: 185 mg/dL (14 Dec 2024 18:11)  POCT Blood Glucose.: 210 mg/dL (14 Dec 2024 14:24)  POCT Blood Glucose.: 156 mg/dL (14 Dec 2024 13:15)    I&O's Summary      PHYSICAL EXAM:    Vital Signs Last 24 Hrs  T(C): 36.8 (15 Dec 2024 06:57), Max: 37.2 (14 Dec 2024 13:45)  T(F): 98.2 (15 Dec 2024 06:57), Max: 99 (14 Dec 2024 13:45)  HR: 70 (15 Dec 2024 06:57) (69 - 88)  BP: 155/79 (15 Dec 2024 06:57) (149/77 - 168/76)  BP(mean): --  RR: 18 (15 Dec 2024 06:57) (15 - 18)  SpO2: 100% (15 Dec 2024 06:57) (99% - 100%)    Parameters below as of 15 Dec 2024 06:57  Patient On (Oxygen Delivery Method): room air        CONSTITUTIONAL: NAD, well-developed, well-groomed  EYES: Conjunctiva and sclera clear  ENMT: Moist oral mucosa, no pharyngeal injection or exudates; normal dentition  NECK: Supple, no palpable masses; no thyromegaly  RESPIRATORY: Normal respiratory effort; lungs are clear to auscultation bilaterally  CARDIOVASCULAR: Regular rate and rhythm, normal S1 and S2, no murmur/rub/gallop; No lower extremity edema; Peripheral pulses are 2+ bilaterally  ABDOMEN: Soft, nontender to palpation, normoactive bowel sounds, no rebound/guarding  MUSCULOSKELETAL: No clubbing or cyanosis of digits; no joint swelling or tenderness to palpation  PSYCH: A+O to person, place, and time; affect appropriate  NEUROLOGY: CN 2-12 are intact and symmetric; no gross sensory deficits   SKIN: No rashes; no palpable lesions    LABS:                        9.5    5.74  )-----------( 309      ( 14 Dec 2024 00:48 )             30.1     12-14    137  |  96[L]  |  10  ----------------------------<  177[H]  3.8   |  23  |  0.61    Ca    9.2      14 Dec 2024 18:20  Phos  2.5     12-14  Mg     1.80     12-14    TPro  5.5[L]  /  Alb  2.7[L]  /  TBili  <0.2  /  DBili  x   /  AST  13  /  ALT  <5  /  AlkPhos  75  12-14          Urinalysis Basic - ( 14 Dec 2024 18:20 )    Color: x / Appearance: x / SG: x / pH: x  Gluc: 177 mg/dL / Ketone: x  / Bili: x / Urobili: x   Blood: x / Protein: x / Nitrite: x   Leuk Esterase: x / RBC: x / WBC x   Sq Epi: x / Non Sq Epi: x / Bacteria: x          RADIOLOGY & ADDITIONAL TESTS: No new imaging  Results Reviewed: Yes  Imaging Personally Reviewed:  Electrocardiogram Personally Reviewed:    COORDINATION OF CARE:  Care Discussed with Consultants/Other Providers [Y/N]:  Prior or Outpatient Records Reviewed [Y/N]:

## 2024-12-15 NOTE — PROGRESS NOTE ADULT - PROBLEM SELECTOR PLAN 3
CT AP w/ oral contrast showed edematous state, s/p Jennifer en Y gastric bypass with no bowel obstruction, or colonic diverticulitis  - Surgery consulted and no acute interventions

## 2024-12-15 NOTE — PROGRESS NOTE ADULT - PROBLEM SELECTOR PLAN 1
- s/p 1L NS and 10u Lantus in ED  - On admission BS not elevated but HCO3 22, BHB 6.5, A1c 12.5%, pH 7.41, lactate 1.2. UA positive for ketones and glucose  - Abdominal pain now improved but initially burning in nature    Plan:  - Started on LR 50mL/hr for 12 hours  - Endocrinology consulted.   - admelog 3u premeal per endo with insulin scales  - Hold lantus for now. Plan to start tomorrow  - CC diet, PPI - s/p 1L NS and 10u Lantus in ED  - On admission BS not elevated but HCO3 22, BHB 6.5, A1c 12.5%, pH 7.41, lactate 1.2. UA positive for ketones and glucose  - Abdominal pain now improved but initially burning in nature    Plan:  - F/u on 6pm BMP, BHB and VBG   - Started on LR 80 mL/hr for 12 hours  - Endocrinology consulted. Awaiting recs  - admelog 3u premeal per endo with insulin scales  - Lantus 10u qhs  - CC diet, PPI - s/p 1L NS and 10u Lantus in ED  - On admission BS not elevated but HCO3 22, BHB 6.5, A1c 12.5%, pH 7.41, lactate 1.2. UA positive for ketones and glucose  - Abdominal pain now improved but initially burning in nature    Plan:  - F/u on 6pm BMP, BHB and VBG   - Started on d5+LR 80 mL/hr for 12 hours  - Endocrinology consulted. Awaiting recs  - admelog 3u premeal per endo with insulin scales  - Lantus 10u qhs  - CC diet, PPI

## 2024-12-15 NOTE — PROGRESS NOTE ADULT - ASSESSMENT
53 y/o M w/ pmh T2DM, gastric bypass who presented with abd pain and nausea for the last 2 days. Admitted for possible starvation ketosis.

## 2024-12-15 NOTE — PROGRESS NOTE ADULT - PROBLEM SELECTOR PLAN 1
- s/p 1L NS and 10u Lantus in ED  - On admission BS not elevated but HCO3 22, BHB 6.5, A1c 12.5%, pH 7.41, lactate 1.2. UA positive for ketones and glucose  - Abdominal pain now improved but initially burning in nature    Plan:  - Started on LR 50mL/hr for 12 hours  - Endocrinology consulted.   - admelog 3u premeal per endo with insulin scales  - Hold lantus for now. Plan to start tomorrow  - CC diet, PPI

## 2024-12-15 NOTE — PROGRESS NOTE ADULT - PROBLEM SELECTOR PLAN 2
- B/l LE ulcerations chronic. L>R  - never evaluated by podiatry. These ulcers likely in setting of DM, but no sensory loss in LE  - ESR: 34, CRP 22    Plan:  - F/u blood cultures  - CRP, ESR  - Surgery consulted.   - F/u VA INDY w/ PVR (with toe pressures) per surgery  - Hold off on antibiotics for now. Will observe - B/l LE ulcerations chronic. L>R  - never evaluated by podiatry. These ulcers likely in setting of DM, but no sensory loss in LE  - ESR: 34, CRP 22  - Blood Cx NGTD  - Low concern for osteo given labs and exam  Plan:  - Surgery consulted and wound care consulted  - F/u VA INDY w/ PVR (with toe pressures) per surgery  - Hold off on antibiotics for now. Will observe

## 2024-12-15 NOTE — CONSULT NOTE ADULT - ASSESSMENT
54M PMHx DM (only on Lantus 10 daily but recently non-adherent), hx of gastric bypass 6mo ago in [place], recent Covid diagnosis, p/w 6 days of abd pain/nausea, found to have poorly controlled DM (A1c 12.5%), with hyperglycemia, elevated AG, BHB 6.5, glucosuria and ketonuria as well as non-healing ulcers to bilateral lower extremities.     endocrine called for DM   Endocrine called for diabetes assistance patient has a longstanding history of diabetes he is currently on Lantus 10 units per the patient at home as well as glimepiride. ? (needs med rec)    A1c was found to be 12.5 inpatient patient with admission glucose 187 he is also followed found to have elevated BHB at 6.5 that down trended to 3.8 and is now back up to 7.3 today.  Patient does not report any history of ICU admissions or diabetic ketoacidosis states that he monitors fingersticks at home intermittently.      1. concern for starvation ketosis given BHOB of 7 and glucose not markedly elevated  he is ordered for dextrose but no fluids at bedside during my exam   please start dextrose with IVF to maintain glucose 100-180 (place this on pump to titrate the rate), dextrose should help decrease ketone production   titrate down the dextrose once taking po   bicarb is 22 and pt is not eating. do not suspect DKA  bc he is not eating do not give premeal insulin.   d.w nursing to assess 10 min into meal to give premeal insulin.  pt reports not eating BF or lunch but is noted to have premeal insulin both times  i am not starting lantus on this pt at this time bc he is not eating  if glucose above >180 X2 start lantus 8 units  if not eating q6hr low dose scale for correction  BHOB expected to be high if not eating        rule out FRANCA  labs to check   cpeptide with am labs   anti ISLET  anti SHEKHAR   zn transporter 8   IA -2 antibody     consider GI consult for continued abdominal pain, gastric bypass hx and inability to tolerate PO        2. HTN  goal BP in DM <130/80  currently above goal with SBP 150s   monitor cautious with BP goals in setting of COVID infection   outpt mc/cr ratio          3. HLD  check lipid panel  consider statin for CV risk reduction if no contrainidcations         message sent via teams to MD Sigifredo Rachel MD  Attending Physician   Department of Endocrinology, Diabetes and Metabolism     weekdays: 9am to 5pm: email [facility] or [facility] and or TEAMS     Nights and weekends: [phone]  Please note that this patient may be followed by a different provider tomorrow.   If no answer or after hours, please contact [phone].  For final dc reccomendations, please call [phone] or page the endocrine fellow on call.

## 2024-12-15 NOTE — PROGRESS NOTE ADULT - SUBJECTIVE AND OBJECTIVE BOX
Karthik Garibay M.D  Resident  Department of Medicine  Available on [employer] Teams    ***********************************************************************  Patient is a 54y old  Male who presents with a chief complaint of Abdominal pain (15 Dec 2024 08:06)      SUBJECTIVE / OVERNIGHT EVENTS: Patient seen and examined at bedside. Patient refused dinner insulin yesterday. Overnight had significant diffuse abd pain that improved with morphine (please check chart note from 12/14 pm).     ADDITIONAL REVIEW OF SYSTEMS:    MEDICATIONS  (STANDING):  chlorhexidine 2% Cloths 1 Application(s) Topical daily  dextrose 5%. 1000 milliLiter(s) (50 mL/Hr) IV Continuous <Continuous>  dextrose 5%. 1000 milliLiter(s) (100 mL/Hr) IV Continuous <Continuous>  dextrose 50% Injectable 25 Gram(s) IV Push once  dextrose 50% Injectable 12.5 Gram(s) IV Push once  dextrose 50% Injectable 25 Gram(s) IV Push once  glucagon  Injectable 1 milliGRAM(s) IntraMuscular once  influenza   Vaccine 0.5 milliLiter(s) IntraMuscular once  insulin lispro (ADMELOG) corrective regimen sliding scale   SubCutaneous three times a day before meals  insulin lispro (ADMELOG) corrective regimen sliding scale   SubCutaneous at bedtime  insulin lispro Injectable (ADMELOG) 3 Unit(s) SubCutaneous three times a day before meals  lactated ringers. 1000 milliLiter(s) (50 mL/Hr) IV Continuous <Continuous>  multivitamin 1 Tablet(s) Oral daily  pantoprazole    Tablet 40 milliGRAM(s) Oral before breakfast  thiamine IVPB 500 milliGRAM(s) IV Intermittent every 8 hours    MEDICATIONS  (PRN):  dextrose Oral Gel 15 Gram(s) Oral once PRN Blood Glucose LESS THAN 70 milliGRAM(s)/deciliter  melatonin 3 milliGRAM(s) Oral at bedtime PRN Insomnia      CAPILLARY BLOOD GLUCOSE      POCT Blood Glucose.: 344 mg/dL (14 Dec 2024 21:51)  POCT Blood Glucose.: 185 mg/dL (14 Dec 2024 18:11)  POCT Blood Glucose.: 210 mg/dL (14 Dec 2024 14:24)  POCT Blood Glucose.: 156 mg/dL (14 Dec 2024 13:15)    I&O's Summary      PHYSICAL EXAM:    Vital Signs Last 24 Hrs  T(C): 36.8 (15 Dec 2024 06:57), Max: 37.2 (14 Dec 2024 13:45)  T(F): 98.2 (15 Dec 2024 06:57), Max: 99 (14 Dec 2024 13:45)  HR: 70 (15 Dec 2024 06:57) (69 - 88)  BP: 155/79 (15 Dec 2024 06:57) (149/77 - 168/76)  BP(mean): --  RR: 18 (15 Dec 2024 06:57) (15 - 18)  SpO2: 100% (15 Dec 2024 06:57) (99% - 100%)    Parameters below as of 15 Dec 2024 06:57  Patient On (Oxygen Delivery Method): room air        CONSTITUTIONAL: NAD, well-developed, well-groomed  EYES: Conjunctiva and sclera clear  ENMT: Moist oral mucosa, no pharyngeal injection or exudates; normal dentition  NECK: Supple, no palpable masses; no thyromegaly  RESPIRATORY: Normal respiratory effort; lungs are clear to auscultation bilaterally  CARDIOVASCULAR: Regular rate and rhythm, normal S1 and S2, no murmur/rub/gallop; No lower extremity edema; Peripheral pulses are 2+ bilaterally  ABDOMEN: Soft, nontender to palpation, normoactive bowel sounds, no rebound/guarding  MUSCULOSKELETAL: No clubbing or cyanosis of digits; no joint swelling or tenderness to palpation  PSYCH: A+O to person, place, and time; affect appropriate  NEUROLOGY: CN 2-12 are intact and symmetric; no gross sensory deficits   SKIN: No rashes; no palpable lesions    LABS:                        9.5    5.74  )-----------( 309      ( 14 Dec 2024 00:48 )             30.1     12-14    137  |  96[L]  |  10  ----------------------------<  177[H]  3.8   |  23  |  0.61    Ca    9.2      14 Dec 2024 18:20  Phos  2.5     12-14  Mg     1.80     12-14    TPro  5.5[L]  /  Alb  2.7[L]  /  TBili  <0.2  /  DBili  x   /  AST  13  /  ALT  <5  /  AlkPhos  75  12-14          Urinalysis Basic - ( 14 Dec 2024 18:20 )    Color: x / Appearance: x / SG: x / pH: x  Gluc: 177 mg/dL / Ketone: x  / Bili: x / Urobili: x   Blood: x / Protein: x / Nitrite: x   Leuk Esterase: x / RBC: x / WBC x   Sq Epi: x / Non Sq Epi: x / Bacteria: x          RADIOLOGY & ADDITIONAL TESTS: No new imaging  Results Reviewed: Yes  Imaging Personally Reviewed:  Electrocardiogram Personally Reviewed:    COORDINATION OF CARE:  Care Discussed with Consultants/Other Providers [Y/N]:  Prior or Outpatient Records Reviewed [Y/N]:

## 2024-12-15 NOTE — PROGRESS NOTE ADULT - ASSESSMENT
53 y/o M w/ pmh T2DM, gastric bypass who presented with abd pain and nausea for the last 2 days. Admitted for likely starvation ketosis.

## 2024-12-15 NOTE — PROGRESS NOTE ADULT - PROBLEM SELECTOR PLAN 2
- B/l LE ulcerations chronic. L>R  - never evaluated by podiatry. These ulcers likely in setting of DM, but no sensory loss in LE    Plan:  - F/u blood cultures  - CRP, ESR  - surgery consulted  - F/u on INDY w/ PVR and toe pressures (per surgery)  - Hold off on antibiotics for now. Will observe

## 2024-12-15 NOTE — CONSULT NOTE ADULT - SUBJECTIVE AND OBJECTIVE BOX
Reason For Consult: DM    HPI:  53 y/o M w/ pmh T2DM on lantus, gastric bypass (in [place] 05/24)  who presented with abd pain and nausea for the last 2 days.     He states 5 days ago he started having lethargy, burning abdominal pain with nausea and chills. Denies diarrhea or constipation and is having regular BM. Around this time he was also going back and forth to [place] so was not home in time to take insulin, while not having much oral intake. 2 days ago he presented to [facility] with these symptoms and was told he has a fever with covid and his BS was in the 400s. He received fluids and insulin and discharged from the ED the following day. The past 2 days his symptoms worsened and he had no food intake with increasing lethargy and more chills. He continued not using his insulin during this time and presented to [facility] for further management. Of note he endorses following up with PCP at [facility] regularly and he also endorses getting a gastric bypass surgery in [place] on 05/24 for his diabetes. States he takes Lantus 10u and Glipizide "25mg".       At ED, afebrile, hemodynamically stable and on RA. Labs were significant for Hg 9.5, no leukocytosis, , grossly normal electrotypes  SCr 0.61, HCO3 22, BHB 6.5, A1c 12.5%, pH 7.41, lactate 1.2. UA positive for ketones and glucose. CT AP w/ oral contrast showed edematous state, s/p Jennifer en Y gastric bypass with no bowel obstruction, colonic diverticulitis, CXR wnl. Patient was covid positive. Given LR 1L bolus, lantus 10u, thiamine, Vitamin B12. Surgery consulted for abdominal pain and ni acute interventions as no gastric BP complications.  (14 Dec 2024 13:44)    Endocrine called for diabetes assistance patient has a longstanding history of diabetes he is currently on Lantus 10 units per the patient at home as well as glimepiride.  A1c was found to be 12.5 inpatient patient with admission glucose 187 he is also followed found to have elevated BHB at 6.5 that down trended to 3.8 and is now back up to 7.3 today.  Patient does not report any history of ICU admissions or diabetic ketoacidosis states that he monitors fingersticks at home intermittently.  He states that overall he is a very healthy diet BMI is 56 kg patient reports he does not feel like eating and did not have any breakfast or lunch today we spoke about inpatient diabetes monitoring and outpatient follow-up      PAST MEDICAL & SURGICAL HISTORY:  Diabetes type 2,       History of Jennifer-en-Y gastric bypass          FAMILY HISTORY:  reports DM in family     Social History:  does not report illcitis       Outpatient Medications:  does not have med [facility]  MEDICATIONS  (STANDING):  chlorhexidine 2% Cloths 1 Application(s) Topical daily  dextrose 5% + lactated ringers. 1000 milliLiter(s) (80 mL/Hr) IV Continuous <Continuous>  dextrose 5%. 1000 milliLiter(s) (50 mL/Hr) IV Continuous <Continuous>  dextrose 5%. 1000 milliLiter(s) (100 mL/Hr) IV Continuous <Continuous>  dextrose 50% Injectable 25 Gram(s) IV Push once  dextrose 50% Injectable 12.5 Gram(s) IV Push once  dextrose 50% Injectable 25 Gram(s) IV Push once  glucagon  Injectable 1 milliGRAM(s) IntraMuscular once  influenza   Vaccine 0.5 milliLiter(s) IntraMuscular once  insulin glargine Injectable (LANTUS) 10 Unit(s) SubCutaneous at bedtime  insulin lispro (ADMELOG) corrective regimen sliding scale   SubCutaneous three times a day before meals  insulin lispro (ADMELOG) corrective regimen sliding scale   SubCutaneous at bedtime  insulin lispro Injectable (ADMELOG) 3 Unit(s) SubCutaneous three times a day before meals  multivitamin 1 Tablet(s) Oral daily  pantoprazole    Tablet 40 milliGRAM(s) Oral before breakfast  thiamine IVPB 500 milliGRAM(s) IV Intermittent every 8 hours    MEDICATIONS  (PRN):  dextrose Oral Gel 15 Gram(s) Oral once PRN Blood Glucose LESS THAN 70 milliGRAM(s)/deciliter  melatonin 3 milliGRAM(s) Oral at bedtime PRN Insomnia      Allergies    No Known Allergies    Intolerances      Review of Systems:  Constitutional: No fever  Eyes: No blurry vision  Neuro: No tremors  HEENT: No pain  Cardiovascular: No chest pain, palpitations  Respiratory: No SOB, no cough  GI: No nausea, vomiting, abdominal pain  : No dysuria  Skin: no rash  Psych: no depression  Endocrine: no polyuria, polydipsia  Hem/lymph: no swelling  Osteoporosis: no fractures    ALL OTHER SYSTEMS REVIEWED AND NEGATIVE        PHYSICAL EXAM:  VITALS: T(C): 36.9 (12-15-24 @ 13:35)  T(F): 98.4 (12-15-24 @ 13:35), Max: 98.4 (12-15-24 @ 13:35)  HR: 75 (12-15-24 @ 13:35) (70 - 75)  BP: 148/81 (12-15-24 @ 13:35) (148/81 - 155/79)  RR:  (17 - 18)  SpO2:  (100% - 100%)  Wt(kg): --  GENERAL: NAD, well-groomed, well-developed  EYES: No proptosis, no lid lag, anicteric  HEENT:  Atraumatic, Normocephalic, moist mucous membranes  RESPIRATORY: Clear to auscultation bilaterally; No rales, rhonchi, wheezing, or rubs  CARDIOVASCULAR: Regular rate and rhythm; No murmurs; no peripheral edema  GI: Soft, nontender, non distended, normal bowel sounds  SKIN: Dry, intact, No rashes or lesions  MUSCULOSKELETAL: Full range of motion, normal strength  NEURO: sensation intact, extraocular movements intact, no tremor, normal reflexes  PSYCH: Alert and oriented x 3, normal affect, normal mood  CUSHING'S SIGNS: no striae    POCT Blood Glucose.: 176 mg/dL (12-15-24 @ 12:16)  POCT Blood Glucose.: 217 mg/dL (12-15-24 @ 08:36)  POCT Blood Glucose.: 344 mg/dL (12-14-24 @ 21:51)  POCT Blood Glucose.: 185 mg/dL (12-14-24 @ 18:11)  POCT Blood Glucose.: 210 mg/dL (12-14-24 @ 14:24)  POCT Blood Glucose.: 156 mg/dL (12-14-24 @ 13:15)  POCT Blood Glucose.: 150 mg/dL (12-14-24 @ 07:32)  POCT Blood Glucose.: 187 mg/dL (12-13-24 @ 23:03)                            9.4    5.60  )-----------( 291      ( 15 Dec 2024 05:41 )             29.0       12-15    137  |  95[L]  |  10  ----------------------------<  172[H]  4.0   |  22  |  0.68    eGFR: 112    Ca    9.0      12-15  Mg     2.00     12-15  Phos  2.8     12-15    TPro  5.5[L]  /  Alb  2.7[L]  /  TBili  <0.2  /  DBili  x   /  AST  13  /  ALT  <5  /  AlkPhos  75  12-14      Thyroid Function Tests:              Radiology:

## 2024-12-16 ENCOUNTER — RESULT REVIEW (OUTPATIENT)
Age: 54
End: 2024-12-16

## 2024-12-16 LAB
ALBUMIN SERPL ELPH-MCNC: 3 G/DL — LOW (ref 3.3–5)
ALP SERPL-CCNC: 75 U/L — SIGNIFICANT CHANGE UP (ref 40–120)
ALT FLD-CCNC: 6 U/L — SIGNIFICANT CHANGE UP (ref 4–41)
ANION GAP SERPL CALC-SCNC: 16 MMOL/L — HIGH (ref 7–14)
AST SERPL-CCNC: 10 U/L — SIGNIFICANT CHANGE UP (ref 4–40)
B-OH-BUTYR SERPL-SCNC: 5.1 MMOL/L — HIGH (ref 0–0.4)
BASOPHILS # BLD AUTO: 0.02 K/UL — SIGNIFICANT CHANGE UP (ref 0–0.2)
BASOPHILS NFR BLD AUTO: 0.4 % — SIGNIFICANT CHANGE UP (ref 0–2)
BILIRUB SERPL-MCNC: 0.3 MG/DL — SIGNIFICANT CHANGE UP (ref 0.2–1.2)
BUN SERPL-MCNC: 9 MG/DL — SIGNIFICANT CHANGE UP (ref 7–23)
C PEPTIDE SERPL-MCNC: 0.7 NG/ML — LOW (ref 1.1–4.4)
CALCIUM SERPL-MCNC: 8.9 MG/DL — SIGNIFICANT CHANGE UP (ref 8.4–10.5)
CHLORIDE SERPL-SCNC: 95 MMOL/L — LOW (ref 98–107)
CHOLEST SERPL-MCNC: 199 MG/DL — SIGNIFICANT CHANGE UP
CO2 SERPL-SCNC: 24 MMOL/L — SIGNIFICANT CHANGE UP (ref 22–31)
CREAT SERPL-MCNC: 0.67 MG/DL — SIGNIFICANT CHANGE UP (ref 0.5–1.3)
EGFR: 112 ML/MIN/1.73M2 — SIGNIFICANT CHANGE UP
EOSINOPHIL # BLD AUTO: 0.09 K/UL — SIGNIFICANT CHANGE UP (ref 0–0.5)
EOSINOPHIL NFR BLD AUTO: 1.7 % — SIGNIFICANT CHANGE UP (ref 0–6)
GLUCOSE BLDC GLUCOMTR-MCNC: 217 MG/DL — HIGH (ref 70–99)
GLUCOSE BLDC GLUCOMTR-MCNC: 230 MG/DL — HIGH (ref 70–99)
GLUCOSE BLDC GLUCOMTR-MCNC: 260 MG/DL — HIGH (ref 70–99)
GLUCOSE BLDC GLUCOMTR-MCNC: 270 MG/DL — HIGH (ref 70–99)
GLUCOSE SERPL-MCNC: 250 MG/DL — HIGH (ref 70–99)
HCT VFR BLD CALC: 30.4 % — LOW (ref 39–50)
HDLC SERPL-MCNC: 50 MG/DL — SIGNIFICANT CHANGE UP
HGB BLD-MCNC: 10 G/DL — LOW (ref 13–17)
IANC: 3.39 K/UL — SIGNIFICANT CHANGE UP (ref 1.8–7.4)
IMM GRANULOCYTES NFR BLD AUTO: 0.4 % — SIGNIFICANT CHANGE UP (ref 0–0.9)
LIPID PNL WITH DIRECT LDL SERPL: 132 MG/DL — HIGH
LYMPHOCYTES # BLD AUTO: 1.3 K/UL — SIGNIFICANT CHANGE UP (ref 1–3.3)
LYMPHOCYTES # BLD AUTO: 24.3 % — SIGNIFICANT CHANGE UP (ref 13–44)
MAGNESIUM SERPL-MCNC: 1.8 MG/DL — SIGNIFICANT CHANGE UP (ref 1.6–2.6)
MCHC RBC-ENTMCNC: 28.8 PG — SIGNIFICANT CHANGE UP (ref 27–34)
MCHC RBC-ENTMCNC: 32.9 G/DL — SIGNIFICANT CHANGE UP (ref 32–36)
MCV RBC AUTO: 87.6 FL — SIGNIFICANT CHANGE UP (ref 80–100)
MONOCYTES # BLD AUTO: 0.52 K/UL — SIGNIFICANT CHANGE UP (ref 0–0.9)
MONOCYTES NFR BLD AUTO: 9.7 % — SIGNIFICANT CHANGE UP (ref 2–14)
NEUTROPHILS # BLD AUTO: 3.39 K/UL — SIGNIFICANT CHANGE UP (ref 1.8–7.4)
NEUTROPHILS NFR BLD AUTO: 63.5 % — SIGNIFICANT CHANGE UP (ref 43–77)
NON HDL CHOLESTEROL: 149 MG/DL — HIGH
NRBC # BLD: 0 /100 WBCS — SIGNIFICANT CHANGE UP (ref 0–0)
NRBC # FLD: 0 K/UL — SIGNIFICANT CHANGE UP (ref 0–0)
PHOSPHATE SERPL-MCNC: 2.5 MG/DL — SIGNIFICANT CHANGE UP (ref 2.5–4.5)
PLATELET # BLD AUTO: 261 K/UL — SIGNIFICANT CHANGE UP (ref 150–400)
POTASSIUM SERPL-MCNC: 4 MMOL/L — SIGNIFICANT CHANGE UP (ref 3.5–5.3)
POTASSIUM SERPL-SCNC: 4 MMOL/L — SIGNIFICANT CHANGE UP (ref 3.5–5.3)
PROT SERPL-MCNC: 5.6 G/DL — LOW (ref 6–8.3)
RBC # BLD: 3.47 M/UL — LOW (ref 4.2–5.8)
RBC # FLD: 12 % — SIGNIFICANT CHANGE UP (ref 10.3–14.5)
SODIUM SERPL-SCNC: 135 MMOL/L — SIGNIFICANT CHANGE UP (ref 135–145)
TRIGL SERPL-MCNC: 93 MG/DL — SIGNIFICANT CHANGE UP
WBC # BLD: 5.34 K/UL — SIGNIFICANT CHANGE UP (ref 3.8–10.5)
WBC # FLD AUTO: 5.34 K/UL — SIGNIFICANT CHANGE UP (ref 3.8–10.5)

## 2024-12-16 PROCEDURE — 99222 1ST HOSP IP/OBS MODERATE 55: CPT | Mod: 25

## 2024-12-16 PROCEDURE — 93923 UPR/LXTR ART STDY 3+ LVLS: CPT | Mod: 26

## 2024-12-16 PROCEDURE — 97597 DBRDMT OPN WND 1ST 20 CM/<: CPT

## 2024-12-16 PROCEDURE — 99232 SBSQ HOSP IP/OBS MODERATE 35: CPT | Mod: GC

## 2024-12-16 PROCEDURE — 93922 UPR/L XTREMITY ART 2 LEVELS: CPT | Mod: 26,59

## 2024-12-16 RX ORDER — INSULIN GLARGINE 100 [IU]/ML
10 INJECTION, SOLUTION SUBCUTANEOUS AT BEDTIME
Refills: 0 | Status: DISCONTINUED | OUTPATIENT
Start: 2024-12-16 | End: 2024-12-16

## 2024-12-16 RX ORDER — ACETAMINOPHEN 500MG 500 MG/1
1000 TABLET, COATED ORAL ONCE
Refills: 0 | Status: COMPLETED | OUTPATIENT
Start: 2024-12-16 | End: 2024-12-16

## 2024-12-16 RX ORDER — SUCRALFATE 1 G/1
1 TABLET ORAL
Refills: 0 | Status: COMPLETED | OUTPATIENT
Start: 2024-12-16 | End: 2024-12-16

## 2024-12-16 RX ORDER — METOCLOPRAMIDE HYDROCHLORIDE 10 MG/1
5 TABLET ORAL EVERY 8 HOURS
Refills: 0 | Status: COMPLETED | OUTPATIENT
Start: 2024-12-16 | End: 2024-12-18

## 2024-12-16 RX ORDER — 0.9 % SODIUM CHLORIDE 0.9 %
1000 INTRAVENOUS SOLUTION INTRAVENOUS
Refills: 0 | Status: DISCONTINUED | OUTPATIENT
Start: 2024-12-16 | End: 2024-12-17

## 2024-12-16 RX ORDER — INSULIN GLARGINE 100 [IU]/ML
12 INJECTION, SOLUTION SUBCUTANEOUS AT BEDTIME
Refills: 0 | Status: DISCONTINUED | OUTPATIENT
Start: 2024-12-16 | End: 2024-12-17

## 2024-12-16 RX ADMIN — Medication 2 MILLIGRAM(S): at 01:39

## 2024-12-16 RX ADMIN — Medication 3 UNIT(S): at 12:46

## 2024-12-16 RX ADMIN — SUCRALFATE 1 GRAM(S): 1 TABLET ORAL at 23:22

## 2024-12-16 RX ADMIN — ACETAMINOPHEN 500MG 400 MILLIGRAM(S): 500 TABLET, COATED ORAL at 10:54

## 2024-12-16 RX ADMIN — Medication 1 APPLICATION(S): at 18:47

## 2024-12-16 RX ADMIN — Medication 2 MILLIGRAM(S): at 00:39

## 2024-12-16 RX ADMIN — Medication 80 MILLILITER(S): at 13:41

## 2024-12-16 RX ADMIN — CHLORHEXIDINE GLUCONATE 1 APPLICATION(S): 1.2 RINSE ORAL at 11:04

## 2024-12-16 RX ADMIN — SUCRALFATE 1 GRAM(S): 1 TABLET ORAL at 17:10

## 2024-12-16 RX ADMIN — Medication 80 MILLILITER(S): at 00:01

## 2024-12-16 RX ADMIN — Medication 1 TABLET(S): at 11:05

## 2024-12-16 RX ADMIN — INSULIN GLARGINE 8 UNIT(S): 100 INJECTION, SOLUTION SUBCUTANEOUS at 00:02

## 2024-12-16 RX ADMIN — Medication 105 MILLIGRAM(S): at 23:21

## 2024-12-16 RX ADMIN — INSULIN GLARGINE 12 UNIT(S): 100 INJECTION, SOLUTION SUBCUTANEOUS at 23:23

## 2024-12-16 RX ADMIN — Medication 105 MILLIGRAM(S): at 06:01

## 2024-12-16 RX ADMIN — Medication 80 MILLILITER(S): at 23:20

## 2024-12-16 RX ADMIN — Medication 5 UNIT(S): at 18:31

## 2024-12-16 RX ADMIN — SUCRALFATE 1 GRAM(S): 1 TABLET ORAL at 12:36

## 2024-12-16 RX ADMIN — SUCRALFATE 1 GRAM(S): 1 TABLET ORAL at 08:55

## 2024-12-16 RX ADMIN — Medication 2: at 12:46

## 2024-12-16 RX ADMIN — Medication 3 UNIT(S): at 08:54

## 2024-12-16 RX ADMIN — Medication 3: at 18:31

## 2024-12-16 RX ADMIN — Medication 80 MILLILITER(S): at 10:10

## 2024-12-16 RX ADMIN — ACETAMINOPHEN 500MG 1000 MILLIGRAM(S): 500 TABLET, COATED ORAL at 11:54

## 2024-12-16 RX ADMIN — METOCLOPRAMIDE HYDROCHLORIDE 5 MILLIGRAM(S): 10 TABLET ORAL at 23:22

## 2024-12-16 RX ADMIN — PANTOPRAZOLE SODIUM 40 MILLIGRAM(S): 40 TABLET, DELAYED RELEASE ORAL at 06:00

## 2024-12-16 RX ADMIN — ACETAMINOPHEN 500MG 650 MILLIGRAM(S): 500 TABLET, COATED ORAL at 23:22

## 2024-12-16 RX ADMIN — Medication 3: at 08:54

## 2024-12-16 RX ADMIN — Medication 105 MILLIGRAM(S): at 13:15

## 2024-12-16 RX ADMIN — METOCLOPRAMIDE HYDROCHLORIDE 5 MILLIGRAM(S): 10 TABLET ORAL at 13:20

## 2024-12-16 RX ADMIN — Medication 40 MILLIGRAM(S): at 23:21

## 2024-12-16 NOTE — DISCHARGE NOTE PROVIDER - NSDCMRMEDTOKEN_GEN_ALL_CORE_FT
alogliptin 25 mg oral tablet: 1 tab(s) orally once a day  glipiZIDE 10 mg oral tablet, extended release: 1 tab(s) orally once a day  insulin glargine 100 units/mL subcutaneous solution: 10 unit(s) subcutaneous once a day   alcohol swabs: Apply topically to affected area 4 times a day  Dexcom G7 : Use as directed  Dexcom G7 Sensors: Change every 10 days  glipiZIDE 10 mg oral tablet, extended release: 1 tab(s) orally once a day ...(filled 9/6/2024 x 90 days)  glucometer (per patient&#x27;s insurance): Test blood sugars four times a day. Dispense #1 glucometer.  glucometer (per patient&#x27;s insurance): Test blood sugars four times a day. Dispense #1 glucometer.  glucose tablets: Follow instructions on bottle when sugar is low.  glucose tablets: Follow instructions on bottle when sugar is low.  lancets: 1 application subcutaneously 4 times a day  lancets: 1 application subcutaneously 4 times a day  test strips (per patient&#x27;s insurance): 1 application subcutaneously 4 times a day. ** Compatible with patient&#x27;s glucometer **  test strips (per patient&#x27;s insurance): 1 application subcutaneously 4 times a day. ** Compatible with patient&#x27;s glucometer **  Tresiba 100 units/mL subcutaneous solution: 20 unit(s) subcutaneous once a day ...(last filled 6/2024)  Urine ketone strips: Use strip mid-urine stream as needed to assess for ketones. Call medical provider or 911 if moderate to high ketones are present.   Admelog 100 units/mL injectable solution: 7 unit(s) injectable 3 times a day (before meals)  alcohol swabs: Apply topically to affected area 4 times a day  atorvastatin 40 mg oral tablet: 1 tab(s) orally once a day (at bedtime)  Dexcom G7 : Use as directed  Dexcom G7 Sensors: Change every 10 days  ferrous sulfate 325 mg (65 mg elemental iron) oral tablet: 1 tab(s) orally 3 times a week Take Monday, Wednesday and Friday  glucometer (per patient&#x27;s insurance): Test blood sugars four times a day. Dispense #1 glucometer.  glucometer (per patient&#x27;s insurance): Test blood sugars four times a day. Dispense #1 glucometer.  glucose tablets: Follow instructions on bottle when sugar is low.  glucose tablets: Follow instructions on bottle when sugar is low.  lancets: 1 application subcutaneously 4 times a day  lancets: 1 application subcutaneously 4 times a day  Lantus 100 units/mL subcutaneous solution: 12 unit(s) subcutaneous once a day (at bedtime)  Multiple Vitamins oral tablet: 1 tab(s) orally once a day  pantoprazole 40 mg oral granule, delayed release: 1 orally 2 times a day  sucralfate 1 g oral tablet: 1 tab(s) orally 4 times a day  test strips (per patient&#x27;s insurance): 1 application subcutaneously 4 times a day. ** Compatible with patient&#x27;s glucometer **  test strips (per patient&#x27;s insurance): 1 application subcutaneously 4 times a day. ** Compatible with patient&#x27;s glucometer **  Urine ketone strips: Use strip mid-urine stream as needed to assess for ketones. Call medical provider or 911 if moderate to high ketones are present.   Admelog 100 units/mL injectable solution: 7 unit(s) injectable 3 times a day (before meals)  alcohol swabs: Apply topically to affected area 4 times a day  atorvastatin 40 mg oral tablet: 1 tab(s) orally once a day (at bedtime)  Dexcom G7 : Use as directed  Dexcom G7 Sensors: Change every 10 days  ferrous sulfate 325 mg (65 mg elemental iron) oral tablet: 1 tab(s) orally 3 times a week Take Monday, Wednesday and Friday  glucometer (per patient&#x27;s insurance): Test blood sugars four times a day. Dispense #1 glucometer.  glucometer (per patient&#x27;s insurance): Test blood sugars four times a day. Dispense #1 glucometer.  glucose tablets: Follow instructions on bottle when sugar is low.  glucose tablets: Follow instructions on bottle when sugar is low.  lancets: 1 application subcutaneously 4 times a day  lancets: 1 application subcutaneously 4 times a day  Lantus 100 units/mL subcutaneous solution: 12 unit(s) subcutaneous once a day (at bedtime)  Multiple Vitamins oral tablet: 1 tab(s) orally once a day  pantoprazole 4 mg/mL oral suspension: 10 milliliter(s) orally 2 times a day  pantoprazole 40 mg oral granule, delayed release: 1 orally 2 times a day  sucralfate 1 g oral tablet: 1 tab(s) orally 4 times a day  test strips (per patient&#x27;s insurance): 1 application subcutaneously 4 times a day. ** Compatible with patient&#x27;s glucometer **  test strips (per patient&#x27;s insurance): 1 application subcutaneously 4 times a day. ** Compatible with patient&#x27;s glucometer **  Urine ketone strips: Use strip mid-urine stream as needed to assess for ketones. Call medical provider or 911 if moderate to high ketones are present.   Admelog 100 units/mL injectable solution: 7 unit(s) injectable 3 times a day (before meals)  alcohol swabs: Apply topically to affected area 4 times a day  atorvastatin 40 mg oral tablet: 1 tab(s) orally once a day (at bedtime)  Dexcom G7 : Use as directed  Dexcom G7 Sensors: Change every 10 days  ferrous sulfate 325 mg (65 mg elemental iron) oral tablet: 1 tab(s) orally 3 times a week Take Monday, Wednesday and Friday  glucometer (per patient&#x27;s insurance): Test blood sugars four times a day. Dispense #1 glucometer.  glucometer (per patient&#x27;s insurance): Test blood sugars four times a day. Dispense #1 glucometer.  glucose tablets: Follow instructions on bottle when sugar is low.  glucose tablets: Follow instructions on bottle when sugar is low.  lancets: 1 application subcutaneously 4 times a day  lancets: 1 application subcutaneously 4 times a day  Lantus 100 units/mL subcutaneous solution: 12 unit(s) subcutaneous once a day (at bedtime)  Multiple Vitamins oral tablet: 1 tab(s) orally once a day  pantoprazole 4 mg/mL oral suspension: 10 milliliter(s) orally 2 times a day  pantoprazole 40 mg oral delayed release tablet: 1 tab(s) orally 2 times a day  pantoprazole 40 mg oral granule, delayed release: 1 orally 2 times a day  sucralfate 1 g oral tablet: 1 tab(s) orally 4 times a day  Syringes: Use for the injection of your insulin  test strips (per patient&#x27;s insurance): 1 application subcutaneously 4 times a day. ** Compatible with patient&#x27;s glucometer **  test strips (per patient&#x27;s insurance): 1 application subcutaneously 4 times a day. ** Compatible with patient&#x27;s glucometer **  Urine ketone strips: Use strip mid-urine stream as needed to assess for ketones. Call medical provider or 911 if moderate to high ketones are present.   Admelog SoloStar 100 units/mL injectable solution: 7 unit(s) injectable 3 times a day (before meals)  alcohol swabs: Apply topically to affected area 4 times a day  atorvastatin 40 mg oral tablet: 1 tab(s) orally once a day (at bedtime)  Dexcom G7 : Use as directed  Dexcom G7 Sensors: Change every 10 days  ferrous sulfate 325 mg (65 mg elemental iron) oral tablet: 1 tab(s) orally 3 times a week Take Monday, Wednesday and Friday  glucometer (per patient&#x27;s insurance): Test blood sugars four times a day. Dispense #1 glucometer.  glucometer (per patient&#x27;s insurance): Test blood sugars four times a day. Dispense #1 glucometer.  glucose tablets: Follow instructions on bottle when sugar is low.  glucose tablets: Follow instructions on bottle when sugar is low.  lancets: 1 application subcutaneously 4 times a day  lancets: 1 application subcutaneously 4 times a day  Lantus Solostar Pen 100 units/mL subcutaneous solution: 12 unit(s) subcutaneous once a day (at bedtime)  Multiple Vitamins oral tablet: 1 tab(s) orally once a day  pantoprazole 40 mg oral delayed release tablet: 1 tab(s) orally 2 times a day  Pen Needles: Please use for your insulin pens  sucralfate 1 g oral tablet: 1 tab(s) orally 4 times a day  Syringes: Use for the injection of your insulin  test strips (per patient&#x27;s insurance): 1 application subcutaneously 4 times a day. ** Compatible with patient&#x27;s glucometer **  test strips (per patient&#x27;s insurance): 1 application subcutaneously 4 times a day. ** Compatible with patient&#x27;s glucometer **  Urine ketone strips: Use strip mid-urine stream as needed to assess for ketones. Call medical provider or 911 if moderate to high ketones are present.

## 2024-12-16 NOTE — DISCHARGE NOTE PROVIDER - NSDCCPCAREPLAN_GEN_ALL_CORE_FT
PRINCIPAL DISCHARGE DIAGNOSIS  Diagnosis: Diabetic ketosis  Assessment and Plan of Treatment:       SECONDARY DISCHARGE DIAGNOSES  Diagnosis: Abdominal pain  Assessment and Plan of Treatment:      PRINCIPAL DISCHARGE DIAGNOSIS  Diagnosis: Diabetic ketosis  Assessment and Plan of Treatment: You came in lethargic and found to have ketosis, which is a finding in diabetes when you do not have adequate intake. For this, we started you on insulin and gave you fluids. Your ketosis resolved but due to your abdominal pain you were not able to eat well. However, on the day of discharge your pain improved and you were able to eat well. Please use the insulin as prescribed and follow up with your endocrinologist. Please also followup with your primary care physician. Please always take your lantus at bedtime even if you ar enot eating. Please do not take the pre-meal insulin if you are not eating. If you experience lightheadedness, lethargy, worsening abdominal pain, or are not able to take food or liquis, please return to the hospital.      SECONDARY DISCHARGE DIAGNOSES  Diagnosis: Abdominal pain  Assessment and Plan of Treatment: You had abdominal pain during admission with eating. We started you on pantoprazole and sucralfate for this and it helped. Please follow up outpatient with gastroenterology for an endoscopy.     PRINCIPAL DISCHARGE DIAGNOSIS  Diagnosis: Diabetic ketosis  Assessment and Plan of Treatment: You came in lethargic and found to have ketosis, which is a finding in diabetes when you do not have adequate intake. For this, we started you on insulin and gave you fluids. Your ketosis resolved but due to your abdominal pain you were not able to eat well. However, on the day of discharge your pain improved and you were able to eat well. Please use the insulin as prescribed and follow up with your endocrinologist Camila Hernandez or you can follow up with [facility] too. The followup instructions with [facility] is included in the discharge documentation. Please also followup with your primary care physician. Please always take your lantus at bedtime even if you ar enot eating. Please do not take the pre-meal insulin if you are not eating. If you experience lightheadedness, lethargy, worsening abdominal pain, or are not able to take food or liquis, please return to the hospital.   Follow up: Please follow up with Dr. Osuna within 1 week of discharge from the hospital, please call [phone] for appointment and discuss that you recently were seen in the hospital.  Wound Care: Please apply mupirocin, adaptic, 4x4 gauze and bernie daily to L foot wound. Weight bearing: Please weight bear as tolerated in a surgical shoe. Antibiotics: Please continue as instructed.      SECONDARY DISCHARGE DIAGNOSES  Diagnosis: Abdominal pain  Assessment and Plan of Treatment: You had abdominal pain during admission with eating. We started you on pantoprazole and sucralfate for this and it helped. Your abdominal pain may be due to stomach ulcers that often form few months after stomach surgery. Please follow up outpatient with gastroenterology for an endoscopy. Please continue taking pantiprazole and sucralfate until you see your gastroenterologist.    Diagnosis: Anemia  Assessment and Plan of Treatment: You were found to be anemic during your stay. Please continue taking your iron supplements every monday, wednesday and friday.

## 2024-12-16 NOTE — CONSULT NOTE ADULT - SUBJECTIVE AND OBJECTIVE BOX
Wound SURGERY CONSULT NOTE    HPI:53 y/o M w/ pmh T2DM on lantus, gastric bypass (in [place] 05/24)  who presented with abd pain and nausea for the last 2 days. He states 5 days ago he started having lethargy, burning abdominal pain with nausea and chills. Denies diarrhea or constipation and is having regular BM. Around this time he was also going back and forth to [place] so was not home in time to take insulin, while not having much oral intake. 2 days ago he presented to [facility] with these symptoms and was told he has a fever with covid and his BS was in the 400s. He received fluids and insulin and discharged from the ED the following day. The past 2 days his symptoms worsened and he had no food intake with increasing lethargy and more chills. He continued not using his insulin during this time and presented to [facility] for further management. Of note he endorses following up with PCP at [facility] regularly and he also endorses getting a gastric bypass surgery in [place] on 05/24 for his diabetes. States he takes Lantus 10u and Glipizide "25mg". At ED, afebrile, hemodynamically stable and on RA. Labs were significant for Hg 9.5, no leukocytosis, , grossly normal electrotypes  SCr 0.61, HCO3 22, BHB 6.5, A1c 12.5%, pH 7.41, lactate 1.2. UA positive for ketones and glucose. CT AP w/ oral contrast showed edematous state, s/p Jennifer en Y gastric bypass with no bowel obstruction, colonic diverticulitis, CXR wnl. Patient was covid positive. Given LR 1L bolus, lantus 10u, thiamine, Vitamin B12. Surgery consulted for abdominal pain and ni acute interventions as no gastric BP complications.  (14 Dec 2024 13:44)      Wound consult requested to assist w/ management of          Current Diet: Diet, Consistent Carbohydrate/No Snacks:   Supplement Feeding Modality:  Oral  Glucerna Shake Cans or Servings Per Day:  3       Frequency:  Three Times a day (12-16-24 @ 19:58)      PAST MEDICAL & SURGICAL HISTORY:  Diabetes type 2, controlled      History of Jennifer-en-Y gastric bypass    REVIEW OF SYSTEMS:  General/ Breast/ Skin/ Neuro/ MSK: see HPI  All other systems negative    MEDICATIONS  (STANDING):  atorvastatin 40 milliGRAM(s) Oral at bedtime  chlorhexidine 2% Cloths 1 Application(s) Topical daily  dextrose 5% + lactated ringers. 1000 milliLiter(s) (80 mL/Hr) IV Continuous <Continuous>  dextrose 5%. 1000 milliLiter(s) (50 mL/Hr) IV Continuous <Continuous>  dextrose 5%. 1000 milliLiter(s) (100 mL/Hr) IV Continuous <Continuous>  dextrose 50% Injectable 25 Gram(s) IV Push once  dextrose 50% Injectable 12.5 Gram(s) IV Push once  dextrose 50% Injectable 25 Gram(s) IV Push once  glucagon  Injectable 1 milliGRAM(s) IntraMuscular once  influenza   Vaccine 0.5 milliLiter(s) IntraMuscular once  insulin glargine Injectable (LANTUS) 12 Unit(s) SubCutaneous at bedtime  insulin lispro (ADMELOG) corrective regimen sliding scale   SubCutaneous three times a day before meals  insulin lispro (ADMELOG) corrective regimen sliding scale   SubCutaneous at bedtime  insulin lispro Injectable (ADMELOG) 5 Unit(s) SubCutaneous three times a day before meals  metoclopramide 5 milliGRAM(s) Oral every 8 hours  multivitamin 1 Tablet(s) Oral daily  mupirocin 2% Nasal 1 Application(s) Both Nostrils two times a day  mupirocin 2% Ointment 1 Application(s) Topical <User Schedule>  pantoprazole    Tablet 40 milliGRAM(s) Oral before breakfast  sucralfate 1 Gram(s) Oral four times a day  thiamine IVPB 500 milliGRAM(s) IV Intermittent every 8 hours    MEDICATIONS  (PRN):  acetaminophen     Tablet .. 650 milliGRAM(s) Oral every 6 hours PRN Mild Pain (1 - 3), Moderate Pain (4 - 6)  dextrose Oral Gel 15 Gram(s) Oral once PRN Blood Glucose LESS THAN 70 milliGRAM(s)/deciliter  melatonin 3 milliGRAM(s) Oral at bedtime PRN Insomnia      Allergies    No Known Allergies    Intolerances    SOCIAL HISTORY:     FAMILY HISTORY:      PHYSICAL EXAM:  Vital Signs Last 24 Hrs  T(C): 36.8 (16 Dec 2024 13:29), Max: 37.2 (15 Dec 2024 21:46)  T(F): 98.2 (16 Dec 2024 13:29), Max: 99 (15 Dec 2024 21:46)  HR: 74 (16 Dec 2024 13:29) (69 - 74)  BP: 135/71 (16 Dec 2024 13:29) (135/71 - 154/69)  BP(mean): --  RR: 17 (16 Dec 2024 13:29) (17 - 18)  SpO2: 99% (16 Dec 2024 13:29) (98% - 99%)    Parameters below as of 16 Dec 2024 13:29  Patient On (Oxygen Delivery Method): room air    Weight (kg): 56.6 (14 Dec 2024 15:15)  BMI (kg/m2): 17.9 (14 Dec 2024 15:15)      Constitutional: NAD  cachectic/ MO/ Obese/ frail  WD/ WN/ WG/ Disheveled  (+) low airloss support surface, (+) fluidized postioining devices, (+) complete cair boots     HEENT:  NC/AT, PERRL, EOMI, mucosa moist, throat clear, trachea midline, neck supple    Cardiovascular: RRR   Respiratory: CTA    Gastrointestinal soft NT/ND (+)BS  (+)PEG (+)ostomy    Neurology  weakened strength & sensation grossly intact/ paraesthesia  nonverbal, no follow commands/ paraplegic    Musculoskeletal:  limited/ FROM, no deformities/ contractures    Vascular: BLE equally warm/ cool,  no cyanosis, clubbing, edema  >LE //BLE edema equal  DP/PT pulses palpable  no overt  ischemia noted  hemosiderin staining    Skin:  moist w/ good turgor  frail,  ecchymosis w/o hematoma  serosanguinous drainage  No odor, erythema, increased warmth, tenderness, induration, fluctuance    LABS/ CULTURES/ RADIOLOGY:                        10.0   5.34  )-----------( 261      ( 16 Dec 2024 06:57 )             30.4       135  |  95  |  9   ----------------------------<  250      [12-16-24 @ 06:57]  4.0   |  24  |  0.67        Ca     8.9     [12-16-24 @ 06:57]      Mg     1.80     [12-16-24 @ 06:57]      Phos  2.5     [12-16-24 @ 06:57]    TPro  5.6  /  Alb  3.0  /  TBili  0.3  /  DBili  x   /  AST  10  /  ALT  6   /  AlkPhos  75  [12-16-24 @ 06:57]      Culture - Blood (collected 12-14-24 @ 01:33)  Source: .Blood BLOOD  Preliminary Report (12-16-24 @ 11:00):    No growth at 48 Hours    Culture - Blood (collected 12-14-24 @ 01:20)  Source: .Blood BLOOD  Preliminary Report (12-16-24 @ 11:00):    No growth at 48 Hours      BILATERAL ANKLE-BRACHIAL INDEX, SEGMENTAL LIMB PRESSURES, AND PULSE VOLUME                                  RECORDING REPORT       Name:        YOVANNY REYES Study Date:       12/16/2024  YOB: 1970      MRN:              [id number]  Patient Age: 54 years      Accession Number: [id number]  Gender:      M             Admission ID:     [id number]  Height:      ( )           BSA (Jimmy)/BMI: /  Referring Physician:    Dennis Gallego MD  Interpreting Physician: Luke Mathias MD, PhD  Primary Sonographer:    Basilio Evans RVT       --------------------------------------------------------------------------------  Procedure:        INDY study with segmental pressures and PVR.  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       --------------------------------------------------------------------------------  CONCLUSIONS:     Normal INDY/PVR study.      --------------------------------------------------------------------------------  MEASUREMENTS:    +---------+------------------+----+------------+--------+  |Right INDY|Rt Pressure (mmHg)|INDY |PVR Waveform|Comment |  +---------+------------------+----+------------+--------+  |Brachial |167               |    |            |        |  +---------+------------------+----+------------+--------+  |Thigh    |218               |1.31|Normal      |        |  +---------+------------------+----+------------+--------+  |Calf     |230               |1.38|Normal      |        |  +---------+------------------+----+------------+--------+  |Ankle    |230               |1.38|Normal      |      |  +---------+------------------+----+------------+--------+  |PTA      |230               |1.38|Normal      |        |  +---------+------------------+----+------------+--------+  |DPA      |223               |1.34|Normal      |        |  +---------+------------------+----+------------+--------+    +--------+------------------+----+------------+-------+  |Left INDY|Lt Pressure (mmHg)|INDY |PVR Waveform|Comment|  +--------+------------------+----+------------+-------+  |Brachial|167   |    |            |       |  +--------+------------------+----+------------+-------+  |Thigh   |229               |1.37|Normal      |       |  +--------+------------------+----+------------+-------+  |Calf    |222               |1.33|Normal      |      |  +--------+------------------+----+------------+-------+  |Ankle   |228               |1.37|Normal      |       |  +--------+------------------+----+------------+-------+  |PTA     |228               |1.37|Normal      |       |  +--------+------------------+----+------------+-------+  |DPA     |206               |1.23|Normal      |       |  +--------+------------------+----+------------+-------+    +---------+------------------+----+------------+-------+  |Right TBI|Rt Pressure (mmHg)|TBI|PPG Waveform|Comment|  +---------+------------------+----+------------+-------+  |Great Toe|124               |0.74|Normal      |       |  +---------+------------------+----+------------+-------+    +---------+------------------+----+------------+-------+  |Left TBI |Lt Pressure (mmHg)|TBI |PPG Waveform|Comment|  +---------+------------------+----+------------+-------+  |Great Toe|135               |0.81|Normal      |       |  +---------+------------------+----+------------+-------+    +-------+-----------+-----------+  |INDY/TBI|Today's INDY|Today's TBI|  +-------+-----------+-----------+  |Right  |1.38       |0.74       |  +-------+-----------+-----------+  |Left   |1.37       |0.81       |  +-------+-----------+-----------+       --------------------------------------------------------------------------------  FINDINGS:     Right Lower Extremity Arteries:  Pulse volume recording (PVR) waveforms appear multiphasic with normal amplitudes throughout the right lower extremity. There is no significant decrease in segmental pressures between arterial segments.    Left Lower Extremity Arteries:  Pulse volume recording (PVR) waveforms appear multiphasic with normal amplitudes throughout the left lower extremity. There is no significant decrease in segmental pressures between arterial segments.    Bilateral Lower Extremity Arteries:  Normal INDY/PVR study.         Electronically signed on 12/16/2024 at 12:32:37 PM by Luke Mathias MD, PhD, FACC, RPVI           *** Final ***                   Wound SURGERY CONSULT NOTE    HPI:53 y/o M w/ pmh T2DM on lantus, gastric bypass (in [place] 05/24)  who presented with abd pain and nausea for the last 2 days. He states 5 days ago he started having lethargy, burning abdominal pain with nausea and chills. Denies diarrhea or constipation and is having regular BM. Around this time he was also going back and forth to [place] so was not home in time to take insulin, while not having much oral intake. 2 days ago he presented to [facility] with these symptoms and was told he has a fever with covid and his BS was in the 400s. He received fluids and insulin and discharged from the ED the following day. The past 2 days his symptoms worsened and he had no food intake with increasing lethargy and more chills. He continued not using his insulin during this time and presented to [facility] for further management. Of note he endorses following up with PCP at [facility] regularly and he also endorses getting a gastric bypass surgery in [place] on 05/24 for his diabetes. States he takes Lantus 10u and Glipizide "25mg". At ED, afebrile, hemodynamically stable and on RA. Labs were significant for Hg 9.5, no leukocytosis, , grossly normal electrotypes  SCr 0.61, HCO3 22, BHB 6.5, A1c 12.5%, pH 7.41, lactate 1.2. UA positive for ketones and glucose. CT AP w/ oral contrast showed edematous state, s/p Jennifer en Y gastric bypass with no bowel obstruction, colonic diverticulitis, CXR wnl. Patient was covid positive. Given LR 1L bolus, lantus 10u, thiamine, Vitamin B12. Surgery consulted for abdominal pain and no acute interventions as no gastric BP complications.  (14 Dec 2024 13:44)      Wound consult requested to assist w/ management of multiple diabetic ulcers to lower extremities. Patient endorses leg wounds appeared about 1 month ago. Endorses he believes his wounds appeared from sandals rubbing in his feet. Denies neuropathy. Patient denies any pain at the time of assessment. Endorses he had a gastric bypass 5 months ago to improve his " diabetes" Patient endorses he lost about 11 pounds since surgery. Endorses good appetite at home. Denies N/V. Reports he ambulates at baseline. Denies hx of smoking or drinking. On contact isolation.     Current Diet: Diet, Consistent Carbohydrate/No Snacks:   Supplement Feeding Modality:  Oral  Glucerna Shake Cans or Servings Per Day:  3   Frequency:  Three Times a day (12-16-24 @ 19:58)      PAST MEDICAL & SURGICAL HISTORY:  Diabetes type 2, controlled      History of Jennifer-en-Y gastric bypass    REVIEW OF SYSTEMS:  General/Skin/MSK: see HPI  All other systems negative    MEDICATIONS  (STANDING):  atorvastatin 40 milliGRAM(s) Oral at bedtime  chlorhexidine 2% Cloths 1 Application(s) Topical daily  dextrose 5% + lactated ringers. 1000 milliLiter(s) (80 mL/Hr) IV Continuous <Continuous>  dextrose 5%. 1000 milliLiter(s) (50 mL/Hr) IV Continuous <Continuous>  dextrose 5%. 1000 milliLiter(s) (100 mL/Hr) IV Continuous <Continuous>  dextrose 50% Injectable 25 Gram(s) IV Push once  dextrose 50% Injectable 12.5 Gram(s) IV Push once  dextrose 50% Injectable 25 Gram(s) IV Push once  glucagon  Injectable 1 milliGRAM(s) IntraMuscular once  influenza   Vaccine 0.5 milliLiter(s) IntraMuscular once  insulin glargine Injectable (LANTUS) 12 Unit(s) SubCutaneous at bedtime  insulin lispro (ADMELOG) corrective regimen sliding scale   SubCutaneous three times a day before meals  insulin lispro (ADMELOG) corrective regimen sliding scale   SubCutaneous at bedtime  insulin lispro Injectable (ADMELOG) 5 Unit(s) SubCutaneous three times a day before meals  metoclopramide 5 milliGRAM(s) Oral every 8 hours  multivitamin 1 Tablet(s) Oral daily  mupirocin 2% Nasal 1 Application(s) Both Nostrils two times a day  mupirocin 2% Ointment 1 Application(s) Topical <User Schedule>  pantoprazole    Tablet 40 milliGRAM(s) Oral before breakfast  sucralfate 1 Gram(s) Oral four times a day  thiamine IVPB 500 milliGRAM(s) IV Intermittent every 8 hours    MEDICATIONS  (PRN):  acetaminophen     Tablet .. 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 Lives with mother and other family members.     FAMILY HISTORY:      PHYSICAL EXAM:  Vital Signs Last 24 Hrs  T(C): 36.8 (16 Dec 2024 13:29), Max: 37.2 (15 Dec 2024 21:46)  T(F): 98.2 (16 Dec 2024 13:29), Max: 99 (15 Dec 2024 21:46)  HR: 74 (16 Dec 2024 13:29) (69 - 74)  BP: 135/71 (16 Dec 2024 13:29) (135/71 - 154/69)  BP(mean): --  RR: 17 (16 Dec 2024 13:29) (17 - 18)  SpO2: 99% (16 Dec 2024 13:29) (98% - 99%)    Parameters below as of 16 Dec 2024 13:29  Patient On (Oxygen Delivery Method): room air    Weight (kg): 56.6 (14 Dec 2024 15:15)  BMI (kg/m2): 17.9 (14 Dec 2024 15:15)      Constitutional: NAD/AOX4   cachectic/frail   (+) low airloss support surface, legs elevated    HEENT:  NC/AT, non icteric,  mucosa moist, throat clear, trachea midline, neck supple  Cardiovascular: Rate regular   Respiratory: Equal chest expansion   Gastrointestinal soft NT/ND, thin   : voids   Neurology: follows commands, patient unable to feel gently touch to distal toes bilaterally   Musculoskeletal: aROM, no deformities/ contractures  Vascular: BLE equally warm,  scattered areas of circular hypopigmentation to bilateral legs, no cyanosis, clubbing, edema  Left DP/PT pulses faint, right PT +2, no right DP pulse palpable   Good hair distribution   no overt  ischemia noted  Right anterior lower legs non fluctuant scab   Left dorsal foot wound   -[id number]   -No dead space  -Scant serosanguinous drainage no odor   -Procedure Note - Selective Excisional  Debridement:  Using aseptic technique   wound was excisional debrided using a scissor and forceps through necrotic/ nonviable epidermis/dermis loosely attach necrotic tissue . Pt tolerated procedure well.  Hemostasis was maintained throughout.    Debulking debridement of well demarcated  necrotic tissue up to the level of not including.  measurements same.  -Tissue type post debridement exposing 100% pink moist dermis   Left foot base of 5th toe wound with mixed pink moist dermis and adherent minimally moist eschar   -Measure in cluster- 3.5cmx3.5cmx0.1cm   -Scant serosanguinous drainage, no odor   -Periwound skin intact, no increased warmth, no erythema, no induration   Left anterior lower leg shallow wound   -1.5cmx1.5cmx0.2cm   -Tissue type exposing 100% pink moist dermis   -Scant serosanguinous drainage, no odor   -Periwound skin intact, no increased warmth, no erythema, no induration   Left medial distal lower leg shallow wound   -1bon0fau2.2cm   -Tissue type exposing 100% pink moist dermis   -Scant serosanguinous drainage, no odor   -Periwound skin intact, no increased warmth, no erythema, no induration   Skin:  moist w/ good turgor  Back skin intact   Psych: calm and cooperative     LABS/ CULTURES/ RADIOLOGY:                        10.0   5.34  )-----------( 261      ( 16 Dec 2024 06:57 )             30.4       135  |  95  |  9   ----------------------------<  250      [12-16-24 @ 06:57]  4.0   |  24  |  0.67        Ca     8.9     [12-16-24 @ 06:57]      Mg     1.80     [12-16-24 @ 06:57]      Phos  2.5     [12-16-24 @ 06:57]    TPro  5.6  /  Alb  3.0  /  TBili  0.3  /  DBili  x   /  AST  10  /  ALT  6   /  AlkPhos  75  [12-16-24 @ 06:57]      Culture - Blood (collected 12-14-24 @ 01:33)  Source: .Blood BLOOD  Preliminary Report (12-16-24 @ 11:00):    No growth at 48 Hours    Culture - Blood (collected 12-14-24 @ 01:20)  Source: .Blood BLOOD  Preliminary Report (12-16-24 @ 11:00):    No growth at 48 Hours      BILATERAL ANKLE-BRACHIAL INDEX, SEGMENTAL LIMB PRESSURES, AND PULSE VOLUME                                  RECORDING REPORT       Name:        YOVANNY REYES Study Date:       12/16/2024  YOB: 1970      MRN:              [id number]  Patient Age: 54 years      Accession Number: [id number]  Gender:      M             Admission ID:     [id number]  Height:      ( )           BSA ([place])/BMI: /  Referring Physician:    Dennis Gallego MD  Interpreting Physician: Luke Mathias MD, PhD  Primary Sonographer:    Basilio Evans RVT       --------------------------------------------------------------------------------  Procedure:        INDY study with segmental pressures and PVR.  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       --------------------------------------------------------------------------------  CONCLUSIONS:     Normal INDY/PVR study.      --------------------------------------------------------------------------------  MEASUREMENTS:    +---------+------------------+----+------------+--------+  |Right INDY|Rt Pressure (mmHg)|INDY |PVR Waveform|Comment |  +---------+------------------+----+------------+--------+  |Brachial |167               |    |            |        |  +---------+------------------+----+------------+--------+  |Thigh    |218               |1.31|Normal      |        |  +---------+------------------+----+------------+--------+  |Calf     |230               |1.38|Normal      |        |  +---------+------------------+----+------------+--------+  |Ankle    |230               |1.38|Normal      |      |  +---------+------------------+----+------------+--------+  |PTA      |230               |1.38|Normal      |        |  +---------+------------------+----+------------+--------+  |DPA      |223               |1.34|Normal      |        |  +---------+------------------+----+------------+--------+    +--------+------------------+----+------------+-------+  |Left INDY|Lt Pressure (mmHg)|INDY |PVR Waveform|Comment|  +--------+------------------+----+------------+-------+  |Brachial|167   |    |            |       |  +--------+------------------+----+------------+-------+  |Thigh   |229               |1.37|Normal      |       |  +--------+------------------+----+------------+-------+  |Calf    |222               |1.33|Normal      |      |  +--------+------------------+----+------------+-------+  |Ankle   |228               |1.37|Normal      |       |  +--------+------------------+----+------------+-------+  |PTA     |228               |1.37|Normal      |       |  +--------+------------------+----+------------+-------+  |DPA     |206               |1.23|Normal      |       |  +--------+------------------+----+------------+-------+    +---------+------------------+----+------------+-------+  |Right TBI|Rt Pressure (mmHg)|TBI|PPG Waveform|Comment|  +---------+------------------+----+------------+-------+  |Great Toe|124               |0.74|Normal      |       |  +---------+------------------+----+------------+-------+    +---------+------------------+----+------------+-------+  |Left TBI |Lt Pressure (mmHg)|TBI |PPG Waveform|Comment|  +---------+------------------+----+------------+-------+  |Great Toe|135               |0.81|Normal      |       |  +---------+------------------+----+------------+-------+    +-------+-----------+-----------+  |INDY/TBI|Today's INDY|Today's TBI|  +-------+-----------+-----------+  |Right  |1.38       |0.74       |  +-------+-----------+-----------+  |Left   |1.37       |0.81       |  +-------+-----------+-----------+       --------------------------------------------------------------------------------  FINDINGS:     Right Lower Extremity Arteries:  Pulse volume recording (PVR) waveforms appear multiphasic with normal amplitudes throughout the right lower extremity. There is no significant decrease in segmental pressures between arterial segments.    Left Lower Extremity Arteries:  Pulse volume recording (PVR) waveforms appear multiphasic with normal amplitudes throughout the left lower extremity. There is no significant decrease in segmental pressures between arterial segments.    Bilateral Lower Extremity Arteries:  Normal INDY/PVR study.         Electronically signed on 12/16/2024 at 12:32:37 PM by Luke Mathias MD, PhD, FACC, RPVI           *** Final ***                   Wound SURGERY CONSULT NOTE    HPI:53 y/o M w/ pmh T2DM on lantus, gastric bypass (in [place] 05/24)  who presented with abd pain and nausea for the last 2 days. He states 5 days ago he started having lethargy, burning abdominal pain with nausea and chills. Denies diarrhea or constipation and is having regular BM. Around this time he was also going back and forth to [place] so was not home in time to take insulin, while not having much oral intake. 2 days ago he presented to [facility] with these symptoms and was told he has a fever with covid and his BS was in the 400s. He received fluids and insulin and discharged from the ED the following day. The past 2 days his symptoms worsened and he had no food intake with increasing lethargy and more chills. He continued not using his insulin during this time and presented to [facility] for further management. Of note he endorses following up with PCP at [facility] regularly and he also endorses getting a gastric bypass surgery in [place] on 05/24 for his diabetes. States he takes Lantus 10u and Glipizide "25mg". At ED, afebrile, hemodynamically stable and on RA. Labs were significant for Hg 9.5, no leukocytosis, , grossly normal electrotypes  SCr 0.61, HCO3 22, BHB 6.5, A1c 12.5%, pH 7.41, lactate 1.2. UA positive for ketones and glucose. CT AP w/ oral contrast showed edematous state, s/p Jennifer en Y gastric bypass with no bowel obstruction, colonic diverticulitis, CXR wnl. Patient was covid positive. Given LR 1L bolus, lantus 10u, thiamine, Vitamin B12. Surgery consulted for abdominal pain and no acute interventions as no gastric BP complications.  (14 Dec 2024 13:44)      Wound consult requested to assist w/ management of multiple diabetic ulcers to lower extremities. Patient endorses leg wounds appeared about 1 month ago. Endorses he believes his wounds appeared from sandals rubbing in his feet. Denies neuropathy. Patient denies any pain at the time of assessment. Endorses he had a gastric bypass 5 months ago to improve his " diabetes" Patient endorses he lost about 11 pounds since surgery. Endorses good appetite at home. Denies N/V. Reports he ambulates at baseline. Denies hx of smoking or drinking. On contact isolation.     Current Diet: Diet, Consistent Carbohydrate/No Snacks:   Supplement Feeding Modality:  Oral  Glucerna Shake Cans or Servings Per Day:  3   Frequency:  Three Times a day (12-16-24 @ 19:58)      PAST MEDICAL & SURGICAL HISTORY:  Diabetes type 2, controlled      History of Jennifer-en-Y gastric bypass    REVIEW OF SYSTEMS:  General/Skin/MSK: see HPI  All other systems negative    MEDICATIONS  (STANDING):  atorvastatin 40 milliGRAM(s) Oral at bedtime  chlorhexidine 2% Cloths 1 Application(s) Topical daily  dextrose 5% + lactated ringers. 1000 milliLiter(s) (80 mL/Hr) IV Continuous <Continuous>  dextrose 5%. 1000 milliLiter(s) (50 mL/Hr) IV Continuous <Continuous>  dextrose 5%. 1000 milliLiter(s) (100 mL/Hr) IV Continuous <Continuous>  dextrose 50% Injectable 25 Gram(s) IV Push once  dextrose 50% Injectable 12.5 Gram(s) IV Push once  dextrose 50% Injectable 25 Gram(s) IV Push once  glucagon  Injectable 1 milliGRAM(s) IntraMuscular once  influenza   Vaccine 0.5 milliLiter(s) IntraMuscular once  insulin glargine Injectable (LANTUS) 12 Unit(s) SubCutaneous at bedtime  insulin lispro (ADMELOG) corrective regimen sliding scale   SubCutaneous three times a day before meals  insulin lispro (ADMELOG) corrective regimen sliding scale   SubCutaneous at bedtime  insulin lispro Injectable (ADMELOG) 5 Unit(s) SubCutaneous three times a day before meals  metoclopramide 5 milliGRAM(s) Oral every 8 hours  multivitamin 1 Tablet(s) Oral daily  mupirocin 2% Nasal 1 Application(s) Both Nostrils two times a day  mupirocin 2% Ointment 1 Application(s) Topical <User Schedule>  pantoprazole    Tablet 40 milliGRAM(s) Oral before breakfast  sucralfate 1 Gram(s) Oral four times a day  thiamine IVPB 500 milliGRAM(s) IV Intermittent every 8 hours    MEDICATIONS  (PRN):  acetaminophen     Tablet .. 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 Lives with mother and other family members.     FAMILY HISTORY:      PHYSICAL EXAM:  Vital Signs Last 24 Hrs  T(C): 36.8 (16 Dec 2024 13:29), Max: 37.2 (15 Dec 2024 21:46)  T(F): 98.2 (16 Dec 2024 13:29), Max: 99 (15 Dec 2024 21:46)  HR: 74 (16 Dec 2024 13:29) (69 - 74)  BP: 135/71 (16 Dec 2024 13:29) (135/71 - 154/69)  BP(mean): --  RR: 17 (16 Dec 2024 13:29) (17 - 18)  SpO2: 99% (16 Dec 2024 13:29) (98% - 99%)    Parameters below as of 16 Dec 2024 13:29  Patient On (Oxygen Delivery Method): room air    Weight (kg): 56.6 (14 Dec 2024 15:15)  BMI (kg/m2): 17.9 (14 Dec 2024 15:15)      Constitutional: NAD/AOX4   cachectic/frail   (+) low airloss support surface, legs elevated    HEENT:  NC/AT, non icteric,  mucosa moist, throat clear, trachea midline, neck supple  Cardiovascular: Rate regular   Respiratory: Equal chest expansion   Gastrointestinal soft NT/ND, thin   : voids   Neurology: follows commands, patient unable to feel gently touch to distal toes bilaterally   Musculoskeletal: aROM, no deformities/ contractures  Vascular: BLE equally warm,  scattered areas of circular hypopigmentation to bilateral legs, no cyanosis, clubbing, edema  Left DP/PT pulses faint, right PT +2, no right DP pulse palpable   Good hair distribution   no overt  ischemia noted  Right anterior lower legs non fluctuant scab   Left dorsal foot wound   -1ntp2led4.2cm   -No dead space  -Scant serosanguinous drainage no odor   -Procedure Note - Selective Excisional  Debridement:  Using aseptic technique l;eft dorsal foot    wound underwent selective excisional debridement using a scissor and forceps through necrotic/ nonviable epidermis up to and including dermis . Pt tolerated procedure well.  Hemostasis was maintained throughout.    Debulking debridement of well demarcated  necrotic tissue up to the level of not including.  measurements same.  -Tissue type post debridement exposing 100% pink moist dermis   Left foot base of 5th toe wound with mixed pink moist dermis and adherent minimally moist eschar   -Measure in cluster- 3.5cmx3.5cmx0.1cm   -Scant serosanguinous drainage, no odor   -Periwound skin intact, no increased warmth, no erythema, no induration   Left anterior lower leg shallow wound   -1.5cmx1.5cmx0.2cm   -Tissue type exposing 100% pink moist dermis   -Scant serosanguinous drainage, no odor   -Periwound skin intact, no increased warmth, no erythema, no induration   Left medial distal lower leg shallow wound   -8gik8xyf3.2cm   -Tissue type exposing 100% pink moist dermis   -Scant serosanguinous drainage, no odor   -Periwound skin intact, no increased warmth, no erythema, no induration   Skin:  moist w/ good turgor  Back skin intact   Psych: calm and cooperative     LABS/ CULTURES/ RADIOLOGY:                        10.0   5.34  )-----------( 261      ( 16 Dec 2024 06:57 )             30.4       135  |  95  |  9   ----------------------------<  250      [12-16-24 @ 06:57]  4.0   |  24  |  0.67        Ca     8.9     [12-16-24 @ 06:57]      Mg     1.80     [12-16-24 @ 06:57]      Phos  2.5     [12-16-24 @ 06:57]    TPro  5.6  /  Alb  3.0  /  TBili  0.3  /  DBili  x   /  AST  10  /  ALT  6   /  AlkPhos  75  [12-16-24 @ 06:57]      Culture - Blood (collected 12-14-24 @ 01:33)  Source: .Blood BLOOD  Preliminary Report (12-16-24 @ 11:00):    No growth at 48 Hours    Culture - Blood (collected 12-14-24 @ 01:20)  Source: .Blood BLOOD  Preliminary Report (12-16-24 @ 11:00):    No growth at 48 Hours      BILATERAL ANKLE-BRACHIAL INDEX, SEGMENTAL LIMB PRESSURES, AND PULSE VOLUME                                  RECORDING REPORT       Name:        YOVANNY REYES Study Date:       12/16/2024  YOB: 1970      MRN:              [id number]  Patient Age: 54 years      Accession Number: [id number]  Gender:      M             Admission ID:     [id number]  Height:      ( )           BSA ([place])/BMI: /  Referring Physician:    Dennis Gallego MD  Interpreting Physician: Luke Mathias MD, PhD  Primary Sonographer:    Basilio Evans RVT       --------------------------------------------------------------------------------  Procedure:        INDY study with segmental pressures and PVR.  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       --------------------------------------------------------------------------------  CONCLUSIONS:     Normal INDY/PVR study.      --------------------------------------------------------------------------------  MEASUREMENTS:    +---------+------------------+----+------------+--------+  |Right INDY|Rt Pressure (mmHg)|INDY |PVR Waveform|Comment |  +---------+------------------+----+------------+--------+  |Brachial |167               |    |            |        |  +---------+------------------+----+------------+--------+  |Thigh    |218               |1.31|Normal      |        |  +---------+------------------+----+------------+--------+  |Calf     |230               |1.38|Normal      |        |  +---------+------------------+----+------------+--------+  |Ankle    |230               |1.38|Normal      |      |  +---------+------------------+----+------------+--------+  |PTA      |230               |1.38|Normal      |        |  +---------+------------------+----+------------+--------+  |DPA      |223               |1.34|Normal      |        |  +---------+------------------+----+------------+--------+    +--------+------------------+----+------------+-------+  |Left INDY|Lt Pressure (mmHg)|INDY |PVR Waveform|Comment|  +--------+------------------+----+------------+-------+  |Brachial|167   |    |            |       |  +--------+------------------+----+------------+-------+  |Thigh   |229               |1.37|Normal      |       |  +--------+------------------+----+------------+-------+  |Calf    |222               |1.33|Normal      |      |  +--------+------------------+----+------------+-------+  |Ankle   |228               |1.37|Normal      |       |  +--------+------------------+----+------------+-------+  |PTA     |228               |1.37|Normal      |       |  +--------+------------------+----+------------+-------+  |DPA     |206               |1.23|Normal      |       |  +--------+------------------+----+------------+-------+    +---------+------------------+----+------------+-------+  |Right TBI|Rt Pressure (mmHg)|TBI|PPG Waveform|Comment|  +---------+------------------+----+------------+-------+  |Great Toe|124               |0.74|Normal      |       |  +---------+------------------+----+------------+-------+    +---------+------------------+----+------------+-------+  |Left TBI |Lt Pressure (mmHg)|TBI |PPG Waveform|Comment|  +---------+------------------+----+------------+-------+  |Great Toe|135               |0.81|Normal      |       |  +---------+------------------+----+------------+-------+    +-------+-----------+-----------+  |INDY/TBI|Today's INDY|Today's TBI|  +-------+-----------+-----------+  |Right  |1.38       |0.74       |  +-------+-----------+-----------+  |Left   |1.37       |0.81       |  +-------+-----------+-----------+       --------------------------------------------------------------------------------  FINDINGS:     Right Lower Extremity Arteries:  Pulse volume recording (PVR) waveforms appear multiphasic with normal amplitudes throughout the right lower extremity. There is no significant decrease in segmental pressures between arterial segments.    Left Lower Extremity Arteries:  Pulse volume recording (PVR) waveforms appear multiphasic with normal amplitudes throughout the left lower extremity. There is no significant decrease in segmental pressures between arterial segments.    Bilateral Lower Extremity Arteries:  Normal INDY/PVR study.         Electronically signed on 12/16/2024 at 12:32:37 PM by Luke Mathias MD, PhD, FACC, RPVI           *** Final ***

## 2024-12-16 NOTE — DIETITIAN INITIAL EVALUATION ADULT - OTHER CALCULATIONS
Defer fluid needs to MD/Team.   Ideal Body Weight: 166lbs / 75.2kg +/-10%   Unable to access [facility] at this time.

## 2024-12-16 NOTE — DISCHARGE NOTE PROVIDER - CARE PROVIDER_API CALL
Camila Hernandez  [address], [address], [place] [address]  Phone: [phone]  Fax: (   )    -  Established Patient  Follow Up Time: 1 week

## 2024-12-16 NOTE — DIETITIAN INITIAL EVALUATION ADULT - PERTINENT MEDS FT
MEDICATIONS  (STANDING):  atorvastatin 40 milliGRAM(s) Oral at bedtime  chlorhexidine 2% Cloths 1 Application(s) Topical daily  dextrose 5% + lactated ringers. 1000 milliLiter(s) (80 mL/Hr) IV Continuous <Continuous>  dextrose 5%. 1000 milliLiter(s) (50 mL/Hr) IV Continuous <Continuous>  dextrose 5%. 1000 milliLiter(s) (100 mL/Hr) IV Continuous <Continuous>  dextrose 50% Injectable 25 Gram(s) IV Push once  dextrose 50% Injectable 12.5 Gram(s) IV Push once  dextrose 50% Injectable 25 Gram(s) IV Push once  glucagon  Injectable 1 milliGRAM(s) IntraMuscular once  influenza   Vaccine 0.5 milliLiter(s) IntraMuscular once  insulin glargine Injectable (LANTUS) 12 Unit(s) SubCutaneous at bedtime  insulin lispro (ADMELOG) corrective regimen sliding scale   SubCutaneous three times a day before meals  insulin lispro (ADMELOG) corrective regimen sliding scale   SubCutaneous at bedtime  insulin lispro Injectable (ADMELOG) 5 Unit(s) SubCutaneous three times a day before meals  metoclopramide 5 milliGRAM(s) Oral every 8 hours  multivitamin 1 Tablet(s) Oral daily  mupirocin 2% Ointment 1 Application(s) Topical <User Schedule>  pantoprazole    Tablet 40 milliGRAM(s) Oral before breakfast  sucralfate 1 Gram(s) Oral four times a day  thiamine IVPB 500 milliGRAM(s) IV Intermittent every 8 hours    MEDICATIONS  (PRN):  acetaminophen     Tablet .. 650 milliGRAM(s) Oral every 6 hours PRN Mild Pain (1 - 3), Moderate Pain (4 - 6)  dextrose Oral Gel 15 Gram(s) Oral once PRN Blood Glucose LESS THAN 70 milliGRAM(s)/deciliter  melatonin 3 milliGRAM(s) Oral at bedtime PRN Insomnia

## 2024-12-16 NOTE — PROGRESS NOTE ADULT - PROBLEM SELECTOR PLAN 3
- B/l LE ulcerations chronic. L>R  - never evaluated by podiatry. These ulcers likely in setting of DM, but no sensory loss in LE  - ESR: 34, CRP 22  - Blood Cx NGTD  - Low concern for osteo given labs and exam  Plan:  - Surgery consulted and wound care consulted  - F/u [facility] INDY w/ PVR (with toe pressures) per surgery  - Hold off on antibiotics for now. Will observe - B/l LE ulcerations chronic. L>R  - never evaluated by podiatry. These ulcers likely in setting of DM, but no sensory loss in LE  - ESR: 34, CRP 22  - Blood Cx NGTD  - Low concern for osteo or cellulitis given labs and exam  Plan:  - Surgery consulted and wound care consulted  - F/u [facility] INDY w/ PVR (with toe pressures) per surgery  - Hold off on antibiotics for now. Will observe - B/l LE ulcerations chronic. L>R  - never evaluated by podiatry. These ulcers likely in setting of DM, but no sensory loss in LE  - ESR: 34, CRP 22  - Blood Cx NGTD  - Low concern for osteo or cellulitis given labs and exam. Low concern for cellulitis  Plan:  - Surgery consulted and wound care consulted  - F/u [facility] INDY w/ PVR (with toe pressures) per surgery  - Hold off on antibiotics for now. Will observe - B/l LE ulcerations chronic. L>R  - never evaluated by podiatry. These ulcers likely in setting of DM, but no sensory loss in LE  - ESR: 34, CRP 22  - Blood Cx NGTD  - Low concern for osteo or cellulitis given labs and exam. Low concern for cellulitis  Plan:  - Surgery consulted (never came to see patient)  - wound care consulted and podiatry consulted per wound care recs  - F/u [facility] INDY w/ PVR (with toe pressures) per surgery  - Hold off on antibiotics for now. Will observe

## 2024-12-16 NOTE — DISCHARGE NOTE PROVIDER - PROVIDER TOKENS
FREE:[LAST:[Hernandez],FIRST:[Camila Carrillo],PHONE:[phone]],FAX:[(   )    -],ADDRESS:[[address] [place] [id number]],FOLLOWUP:[1 week],ESTABLISHEDPATIENT:[T]]

## 2024-12-16 NOTE — DISCHARGE NOTE PROVIDER - NSFOLLOWUPCLINICS_GEN_ALL_ED_FT
[facility]  Gastroenterology  [address] [place] [address]  Phone: [phone]  Fax:   Follow Up Time: 2 weeks    [facility] [place] [facility]  Ophthalmology  [address] [address]  Phone: [phone]  Fax:   Follow Up Time: 2 weeks

## 2024-12-16 NOTE — CONSULT NOTE ADULT - ASSESSMENT
54M presents with L foot wound to dermis   - Pt seen and evaluated   - Afebrile, no leukocytosis, ESR 34, CRP 2.2  - L dorsal foot wound to dermis, granular wound bed, periwound epithelialization, no erythema, no malodor, no pus, no drainage, no signs of infection. R foot no open wounds or signs of infection   - No Culture 2/2 no signs of infection   - No acute podiatric surgical intervention   - Rec Local wound care with mupirocin, adaptic, 4x4 gauze and bernie, orders placed   - Stable for d/c from podiatry to follow up outpt   - Follow up and wound care recs listed in discharge provider note   - Discussed with attending  54M presents with L foot wound to dermis   - Pt seen and evaluated   - Afebrile, no leukocytosis, ESR 34, CRP 2.2  - L dorsal foot wound to dermis, granular wound bed, periwound epithelialization, no erythema, no malodor, no pus, no drainage, no signs of infection. R foot no open wounds or signs of infection   - No Culture 2/2 no signs of infection   - No acute podiatric surgical intervention   - Rec Local wound care with mupirocin, adaptic, 4x4 gauze and bernie, orders placed   - Stable for d/c from podiatry to follow up outpt   - please reconsult PRN  - Follow up and wound care recs listed in discharge provider note   - Discussed with attending

## 2024-12-16 NOTE — PROGRESS NOTE ADULT - PROBLEM SELECTOR PLAN 2
gastroparesis vs small bowel overgrowth vs unlikely structural complication of gastric bypass gastroparesis vs small bowel overgrowth vs unlikely structural complication of gastric bypass  - Hepatobiliary labs wnl  - Burning pain  - CTAP 12/14: only showed diverticulosis and post surgical changes of stomach    Plan:  - Reglan 5 q8h for 2 days  - Sucralfate 4x today with meals and at night

## 2024-12-16 NOTE — CONSULT NOTE ADULT - SUBJECTIVE AND OBJECTIVE BOX
Patient is a 54y old  Male who presents with a chief complaint of Abdominal pain (16 Dec 2024 07:32)      HPI:  53 y/o M w/ pmh T2DM on lantus, gastric bypass (in [place] 05/24)  who presented with abd pain and nausea for the last 2 days.     He states 5 days ago he started having lethargy, burning abdominal pain with nausea and chills. Denies diarrhea or constipation and is having regular BM. Around this time he was also going back and forth to [place] so was not home in time to take insulin, while not having much oral intake. 2 days ago he presented to [facility] with these symptoms and was told he has a fever with covid and his BS was in the 400s. He received fluids and insulin and discharged from the ED the following day. The past 2 days his symptoms worsened and he had no food intake with increasing lethargy and more chills. He continued not using his insulin during this time and presented to [facility] for further management. Of note he endorses following up with PCP at [facility] regularly and he also endorses getting a gastric bypass surgery in [place] on 05/24 for his diabetes. States he takes Lantus 10u and Glipizide "25mg".       At ED, afebrile, hemodynamically stable and on RA. Labs were significant for Hg 9.5, no leukocytosis, , grossly normal electrotypes  SCr 0.61, HCO3 22, BHB 6.5, A1c 12.5%, pH 7.41, lactate 1.2. UA positive for ketones and glucose. CT AP w/ oral contrast showed edematous state, s/p Jennifer en Y gastric bypass with no bowel obstruction, colonic diverticulitis, CXR wnl. Patient was covid positive. Given LR 1L bolus, lantus 10u, thiamine, Vitamin B12. Surgery consulted for abdominal pain and ni acute interventions as no gastric BP complications.  (14 Dec 2024 13:44)      PAST MEDICAL & SURGICAL HISTORY:  Diabetes type 2, controlled      History of Jennifer-en-Y gastric bypass          MEDICATIONS  (STANDING):  atorvastatin 40 milliGRAM(s) Oral at bedtime  chlorhexidine 2% Cloths 1 Application(s) Topical daily  dextrose 5% + lactated ringers. 1000 milliLiter(s) (80 mL/Hr) IV Continuous <Continuous>  dextrose 5%. 1000 milliLiter(s) (50 mL/Hr) IV Continuous <Continuous>  dextrose 5%. 1000 milliLiter(s) (100 mL/Hr) IV Continuous <Continuous>  dextrose 50% Injectable 25 Gram(s) IV Push once  dextrose 50% Injectable 12.5 Gram(s) IV Push once  dextrose 50% Injectable 25 Gram(s) IV Push once  glucagon  Injectable 1 milliGRAM(s) IntraMuscular once  influenza   Vaccine 0.5 milliLiter(s) IntraMuscular once  insulin glargine Injectable (LANTUS) 8 Unit(s) SubCutaneous at bedtime  insulin lispro (ADMELOG) corrective regimen sliding scale   SubCutaneous three times a day before meals  insulin lispro (ADMELOG) corrective regimen sliding scale   SubCutaneous at bedtime  insulin lispro Injectable (ADMELOG) 3 Unit(s) SubCutaneous three times a day before meals  metoclopramide 5 milliGRAM(s) Oral every 8 hours  multivitamin 1 Tablet(s) Oral daily  pantoprazole    Tablet 40 milliGRAM(s) Oral before breakfast  sucralfate 1 Gram(s) Oral four times a day  thiamine IVPB 500 milliGRAM(s) IV Intermittent every 8 hours    MEDICATIONS  (PRN):  acetaminophen     Tablet .. 650 milliGRAM(s) Oral every 6 hours PRN Mild Pain (1 - 3), Moderate Pain (4 - 6)  dextrose Oral Gel 15 Gram(s) Oral once PRN Blood Glucose LESS THAN 70 milliGRAM(s)/deciliter  melatonin 3 milliGRAM(s) Oral at bedtime PRN Insomnia      Allergies    No Known Allergies    Intolerances        VITALS:    Vital Signs Last 24 Hrs  T(C): 36.8 (16 Dec 2024 13:29), Max: 37.2 (15 Dec 2024 21:46)  T(F): 98.2 (16 Dec 2024 13:29), Max: 99 (15 Dec 2024 21:46)  HR: 74 (16 Dec 2024 13:29) (69 - 74)  BP: 135/71 (16 Dec 2024 13:29) (135/71 - 154/69)  BP(mean): --  RR: 17 (16 Dec 2024 13:29) (17 - 18)  SpO2: 99% (16 Dec 2024 13:29) (98% - 99%)    Parameters below as of 16 Dec 2024 13:29  Patient On (Oxygen Delivery Method): room air        LABS:                          10.0   5.34  )-----------( 261      ( 16 Dec 2024 06:57 )             30.4       12-16    135  |  95[L]  |  9   ----------------------------<  250[H]  4.0   |  24  |  0.67    Ca    8.9      16 Dec 2024 06:57  Phos  2.5     12-16  Mg     1.80     12-16    TPro  5.6[L]  /  Alb  3.0[L]  /  TBili  0.3  /  DBili  x   /  AST  10  /  ALT  6   /  AlkPhos  75  12-16      CAPILLARY BLOOD GLUCOSE      POCT Blood Glucose.: 230 mg/dL (16 Dec 2024 12:35)  POCT Blood Glucose.: 270 mg/dL (16 Dec 2024 08:41)  POCT Blood Glucose.: 239 mg/dL (15 Dec 2024 23:50)  POCT Blood Glucose.: 237 mg/dL (15 Dec 2024 22:01)  POCT Blood Glucose.: 237 mg/dL (15 Dec 2024 18:13)          LOWER EXTREMITY PHYSICAL EXAM:    Vascular: DP/PT 2/4, B/L, CFT <3 seconds B/L, Temperature gradient warm to warm, B/L.   Neuro: Epicritic sensation diminished to the level of digits, B/L.  Musculoskeletal/Ortho: unremarkable  Skin: L dorsal foot wound to dermis, granular wound bed, periwound epithelialization, no erythema, no malodor, no pus, no drainage, no signs of infection. R foot no open wounds or signs of infection       RADIOLOGY & ADDITIONAL STUDIES:

## 2024-12-16 NOTE — CONSULT NOTE ADULT - ASSESSMENT
Assessment/Plan:    Wound Consult requested to assist w/ management of    FULL NOTE TO FOLLOW  Appreciate Nutrition Consult for optimization as tolerated in pt w/ Protein Calorie Malnutirion  Continue turning and positioning w/ offloading assistive devices as per protocol  Continue w/ Pericare as per protocol  Waffle Cushion to chair if oob to chair  Continue w/ low air loss fluidized bed surface     Care as per medicine, will follow w/ you periodically during hopsital stay. please reconsult earlier if needed     Upon discharge f/u as outpatient at [facility] [address] [place] [address] [phone]  Seen w/ wound care attng today Sue RYDER, and D/w team     Thank you for this consult  Loulou Florez, MAURICIO, TOMER (pager #[phone] or available in MS teams)    If after 4PM or before 7:30AM on Mon-Friday or weekend/holiday please contact general surgery for urgent matters.   Team A- [id number]   Team B- [id number]  For non-urgent matters e-mail [email or web address] Assessment/Plan: 55 y/o M w/ pmh T2DM, gastric bypass who presented with abd pain and nausea for the last 2 days. Admitted for likely starvation ketosis. Appreciate endocrine following for poorly controlled DM.     Wound Consult requested to assist w/ management of multiple diabetic ulcers in bilateral lower extremities.     LLE wounds likely secondary to neuropathy complicated by uncontrolled DM   -12/16, INDY/PVR normal   -Hemoglobin A1C 12.5% (12-14-24)-Management per primary team/endocrine   -RLE with intact non fluctuant scabs, left medial lower leg, left dorsal foot and left foot base of fifth toe with open wounds, predominantly pink moist dermis, eschar in left foot base of 5ht toe   -patient unable to feel gently touch to distal toes bilaterally, no open wounds in toes   -No s/s of acute infection, WBC wnl, afebrile   -Scant serosanguinous drainage, no odor   -Topical recommendations: left leg wounds: Clean wound and periwound skin with NS. Pat dry. Apply liquid barrier film to periwound skin, Apply Medihoney gel to base of wounds, cover with silicone foam with border. Change daily or prn if soiled   -Counseled patient about the importance of proper footwear and foot inspection. Also discussed importance of glycemic control for wound healing. Patient verbalized understanding.   -RLE non fluctuant scabs- keep clean and dry. Apply liquid barrier film daily.     Left dorsal foot wound  likely neuropathic complicated by uncontrolled DM   -s/p selective debridement of necrotic cap through dermis/epidermis today   -appreciate podiatry consult, deferred left foot wounds to podiatry team (dorsal and base of 5th toe wounds))  -Management as per podiatry team (see consult note 12/16 for recs)    Additional Recs   Appreciate Nutrition Consult for optimization as tolerated in pt who is underweight   Continue turning and positioning w/ offloading assistive devices as per protocol  Continue w/ Pericare as per protocol  Waffle Cushion to chair if oob to chair  Continue w/ low air loss fluidized bed surface     Care as per medicine. Thank you for this consult. Please reconsult if needed     Upon discharge f/u as outpatient at [facility] [address] [place] [address] [phone]  Seen w/ wound care attng today Sue RYDER, and D/w team MD     Thank you for this consult  Loulou Florez, UYEN-BC, CWCN (pager #[phone] or available in MS teams)    If after 4PM or before 7:30AM on Mon-Friday or weekend/holiday please contact general surgery for urgent matters.   Team A- [id number]   Team B- [id number]  For non-urgent matters e-mail [email or web address]

## 2024-12-16 NOTE — DISCHARGE NOTE PROVIDER - DETAILS OF MALNUTRITION DIAGNOSIS/DIAGNOSES
This patient has been assessed with a concern for Malnutrition and was treated during this hospitalization for the following Nutrition diagnosis/diagnoses:     -  12/16/2024: Underweight (BMI < 19)

## 2024-12-16 NOTE — PROGRESS NOTE ADULT - PROBLEM SELECTOR PLAN 6
- Fluids: LR  - Electrolytes: Will replete to maintain K>4, Phos>3, and Mag>2  - Nutrition: CC diet  - Activity: As tolerated  - DVT Prophylaxis: None  - Stress Ulcer/GI Prophylaxis: PPI  - Disposition: Admit for management - Fluids: None  - Electrolytes: Will replete to maintain K>4, Phos>3, and Mag>2  - Nutrition: CC diet  - Activity: As tolerated  - DVT Prophylaxis: None  - Stress Ulcer/GI Prophylaxis: PPI  - Disposition: Admit for management

## 2024-12-16 NOTE — PROGRESS NOTE ADULT - PROBLEM SELECTOR PLAN 1
- s/p 1L NS and 10u Lantus in ED  - On admission BS not elevated but HCO3 22, BHB 6.5, A1c 12.5%, pH 7.41, lactate 1.2. UA positive for ketones and glucose  - Abdominal pain now improved but initially burning in nature    Plan:  - F/u on 6pm BMP, BHB and VBG   - Started on d5+LR 80 mL/hr for 12 hours  - Endocrinology consulted. Awaiting recs  - admelog 3u premeal per endo with insulin scales  - Lantus 10u qhs  - CC diet, PPI - s/p 1L NS and 10u Lantus in ED  - On admission BS not elevated but HCO3 22, BHB 6.5, A1c 12.5%, pH 7.41, lactate 1.2. UA positive for ketones and glucose      Plan:  - Endocrinology following. Awaiting recs  - Patient says he will eat today, if not can restart fluids  - Started on 8u Lantus  - admelog 3u premeal per endo with insulin scales  - f/u on cpeptide, anti ISLET, anti SHEKHAR, zn transporter 8, IA -2 antibody   - CC diet, PPI  - Start Atorvastatin 40mg qD (ASCVD score elevated) - s/p 1L NS and 10u Lantus in ED  - On admission BS not elevated but HCO3 22, BHB 6.5, A1c 12.5%, pH 7.41, lactate 1.2. UA positive for ketones and glucose      Plan:  - Endocrinology following. Awaiting recs  - Continue D5 in setting of decreased PO intake  - Started on 8u Lantus  - admelog 3u premeal per endo with insulin scales  - f/u on cpeptide, anti ISLET, anti SHEKHAR, zn transporter 8, IA -2 antibody   - CC diet, PPI  - Start Atorvastatin 40mg qD (ASCVD score elevated)

## 2024-12-16 NOTE — DIETITIAN INITIAL EVALUATION ADULT - ORAL INTAKE PTA/DIET HISTORY
Patient reports good appetite/PO intake PTA, consumes a "low carbohydrate diet" at baseline. Pt confirms NKFA, food intolerance. Pt reported UBW 147lbs/66.8kg unable to determine time. Pt reported being "skinny". Pt reported nausea PTA.

## 2024-12-16 NOTE — DIETITIAN INITIAL EVALUATION ADULT - PROBLEM SELECTOR PROBLEM 3
Problem: Adult Inpatient Plan of Care  Goal: Optimal Comfort and Wellbeing  Outcome: Ongoing, Progressing     Problem: Diabetes Comorbidity  Goal: Blood Glucose Level Within Targeted Range  Outcome: Ongoing, Progressing     Problem: Fluid and Electrolyte Imbalance (Acute Kidney Injury/Impairment)  Goal: Fluid and Electrolyte Balance  Outcome: Ongoing, Progressing     Problem: Dysrhythmia  Goal: Normalized Cardiac Rhythm  Outcome: Ongoing, Progressing      H/O gastric bypass

## 2024-12-16 NOTE — DIETITIAN INITIAL EVALUATION ADULT - PERTINENT LABORATORY DATA
12-16    135  |  95[L]  |  9   ----------------------------<  250[H]  4.0   |  24  |  0.67    Ca    8.9      16 Dec 2024 06:57  Phos  2.5     12-16  Mg     1.80     12-16    TPro  5.6[L]  /  Alb  3.0[L]  /  TBili  0.3  /  DBili  x   /  AST  10  /  ALT  6   /  AlkPhos  75  12-16  CAPILLARY BLOOD GLUCOSE  POCT Blood Glucose.: 230 mg/dL (16 Dec 2024 12:35)  POCT Blood Glucose.: 270 mg/dL (16 Dec 2024 08:41)  POCT Blood Glucose.: 239 mg/dL (15 Dec 2024 23:50)  POCT Blood Glucose.: 237 mg/dL (15 Dec 2024 22:01)  POCT Blood Glucose.: 237 mg/dL (15 Dec 2024 18:13)  A1C with Estimated Average Glucose Result: 12.5 % (12-14-24 @ 00:48)

## 2024-12-16 NOTE — PHARMACOTHERAPY INTERVENTION NOTE - NSPHARMCOMMPTEDU
12w0d    Patient feels good overall. Here with mother in law today.  Patient's nausea is still present, but managed. Denies the need for any further medication. Small frequent meals encouraged to prevent nausea.  Educated about diet, exercise and normal weight gain  Normal to feel movement between 18-22 weeks  Reviewed labs from 1st OB, all normal.    Warning signs discussed. Clinic phone number given.    Will do Pap after Jan 1st.    Return to clinic 4 weeks    Mitali DOS SANTOS student    I was present with the student who participated in the service and in the documentation of the note. I have verified the history and personally performed the physical exam and medical decision-making. I agree with the assessment and plan of care as documented in the note.      oLra CRESPO CNM           
Patient Education - Diabetes
Patient Education - Anticoagulation

## 2024-12-16 NOTE — DISCHARGE NOTE PROVIDER - NSDCFUADDAPPT_GEN_ALL_CORE_FT
Podiatry Discharge Instructions:  Follow up: Please follow up with Dr. Osuna within 1 week of discharge from the hospital, please call [phone] for appointment and discuss that you recently were seen in the hospital.  Wound Care: Please apply mupirocin, adaptic, 4x4 gauze and bernie daily to L foot wound  Weight bearing: Please weight bear as tolerated in a surgical shoe.  Antibiotics: Please continue as instructed. APPTS ARE READY TO BE MADE: [X] YES    Best Family or Patient Contact (if needed):    Additional Information about above appointments (if needed):    1: Podiatry with Dr. Osuna ([phone])  2: Opthalmology for diabetes followup ()  3: Endocrinology ([address] [place] [address]; Ph # [phone])  4. Gastroenterology (Ph # [phone])    Other comments or requests:       Podiatry Discharge Instructions:  Follow up: Please follow up with Dr. Osuna within 1 week of discharge from the hospital, please call [phone] for appointment and discuss that you recently were seen in the hospital.  Wound Care: Please apply mupirocin, adaptic, 4x4 gauze and bernie daily to L foot wound  Weight bearing: Please weight bear as tolerated in a surgical shoe.  Antibiotics: Please continue as instructed. APPTS ARE READY TO BE MADE: [X] YES    Best Family or Patient Contact (if needed):    Additional Information about above appointments (if needed):    1: Podiatry with Dr. Osuna ([phone])  2: Opthalmology for diabetes followup ()  3: Endocrinology (can do [facility] or [facility]) ([address] [place] [address]; Ph # [phone])  4. Gastroenterology (Ph # [phone])    Other comments or requests:       Podiatry Discharge Instructions:  Follow up: Please follow up with Dr. Osuna within 1 week of discharge from the hospital, please call [phone] for appointment and discuss that you recently were seen in the hospital.  Wound Care: Please apply mupirocin, adaptic, 4x4 gauze and bernie daily to L foot wound  Weight bearing: Please weight bear as tolerated in a surgical shoe.  Antibiotics: Please continue as instructed. APPTS ARE READY TO BE MADE: [X] YES    Best Family or Patient Contact (if needed):    Additional Information about above appointments (if needed):    1: Podiatry with Dr. Osuna ([phone])  2: Opthalmology for diabetes followup ()  3: Endocrinology (can do [facility] or [facility]) ([address] [place] [address]; Ph # [phone])  4. Gastroenterology (Ph # [phone])    Other comments or requests:       Patient was outreached but did not answer. A voicemail was left for the patient to return our call.    Patient informed us they already have secured a follow up appointment which was not scheduled by our team. No details were provide by patient.    Podiatry Discharge Instructions:  Follow up: Please follow up with Dr. Osuna within 1 week of discharge from the hospital, please call [phone] for appointment and discuss that you recently were seen in the hospital.  Wound Care: Please apply mupirocin, adaptic, 4x4 gauze and bernie daily to L foot wound  Weight bearing: Please weight bear as tolerated in a surgical shoe.  Antibiotics: Please continue as instructed.

## 2024-12-16 NOTE — DIETITIAN INITIAL EVALUATION ADULT - OTHER INFO
Patient seen at bedside. Observed lunch tray at bedside untouched offered help. Pt refused as he verbalized abdominal pain. No food preferences verbalized at this time. Pt amenable to trial of nutritional supplement once daily to optimize PO intake. Noted provision of IV hydration (dextrose 5%) per medication list.  Patient denies difficulty chewing or swallowing at present. Pt denies any nausea, vomiting, diarrhea, or constipation at this time. Pt reported last BM two days ago. Not noted to be on a bowel regimen. Per chart, Pt Lower extremity ulceration. Wound consult pending. Ordered for multivitamin, thiamine for micronutrient support. Labs noted for elevated POCT 230-237mg/dL and HgA1c 12.5%. Pt educated on Consistent Carbohydrate diet and understanding. Endocrinology following for insulin adjustment. RD remains available upon request and will follow up per protocol.

## 2024-12-16 NOTE — PROGRESS NOTE ADULT - SUBJECTIVE AND OBJECTIVE BOX
Karthik Garibay M.D  Resident  Department of Medicine  Available on [employer] Teams    ***********************************************************************  Patient is a 54y old  Male who presents with a chief complaint of Abdominal pain (15 Dec 2024 15:24)      SUBJECTIVE / OVERNIGHT EVENTS: Patient seen and examined at bedside.     ADDITIONAL REVIEW OF SYSTEMS:    MEDICATIONS  (STANDING):  chlorhexidine 2% Cloths 1 Application(s) Topical daily  dextrose 5% + lactated ringers. 1000 milliLiter(s) (80 mL/Hr) IV Continuous <Continuous>  dextrose 5%. 1000 milliLiter(s) (50 mL/Hr) IV Continuous <Continuous>  dextrose 5%. 1000 milliLiter(s) (100 mL/Hr) IV Continuous <Continuous>  dextrose 50% Injectable 25 Gram(s) IV Push once  dextrose 50% Injectable 12.5 Gram(s) IV Push once  dextrose 50% Injectable 25 Gram(s) IV Push once  glucagon  Injectable 1 milliGRAM(s) IntraMuscular once  influenza   Vaccine 0.5 milliLiter(s) IntraMuscular once  insulin glargine Injectable (LANTUS) 8 Unit(s) SubCutaneous at bedtime  insulin lispro (ADMELOG) corrective regimen sliding scale   SubCutaneous three times a day before meals  insulin lispro (ADMELOG) corrective regimen sliding scale   SubCutaneous at bedtime  insulin lispro Injectable (ADMELOG) 3 Unit(s) SubCutaneous three times a day before meals  multivitamin 1 Tablet(s) Oral daily  pantoprazole    Tablet 40 milliGRAM(s) Oral before breakfast  thiamine IVPB 500 milliGRAM(s) IV Intermittent every 8 hours    MEDICATIONS  (PRN):  acetaminophen     Tablet .. 650 milliGRAM(s) Oral every 6 hours PRN Mild Pain (1 - 3), Moderate Pain (4 - 6)  dextrose Oral Gel 15 Gram(s) Oral once PRN Blood Glucose LESS THAN 70 milliGRAM(s)/deciliter  melatonin 3 milliGRAM(s) Oral at bedtime PRN Insomnia      CAPILLARY BLOOD GLUCOSE      POCT Blood Glucose.: 239 mg/dL (15 Dec 2024 23:50)  POCT Blood Glucose.: 237 mg/dL (15 Dec 2024 22:01)  POCT Blood Glucose.: 237 mg/dL (15 Dec 2024 18:13)  POCT Blood Glucose.: 176 mg/dL (15 Dec 2024 12:16)  POCT Blood Glucose.: 217 mg/dL (15 Dec 2024 08:36)    I&O's Summary    15 Dec 2024 07:01  -  16 Dec 2024 07:00  --------------------------------------------------------  IN: 1240 mL / OUT: 900 mL / NET: 340 mL        PHYSICAL EXAM:    Vital Signs Last 24 Hrs  T(C): 37.1 (16 Dec 2024 07:04), Max: 37.2 (15 Dec 2024 21:46)  T(F): 98.8 (16 Dec 2024 07:04), Max: 99 (15 Dec 2024 21:46)  HR: 69 (16 Dec 2024 07:04) (69 - 75)  BP: 154/69 (16 Dec 2024 07:04) (139/84 - 154/69)  BP(mean): --  RR: 18 (16 Dec 2024 07:04) (17 - 18)  SpO2: 98% (16 Dec 2024 07:04) (98% - 100%)    Parameters below as of 16 Dec 2024 07:04  Patient On (Oxygen Delivery Method): room air        CONSTITUTIONAL: NAD, well-developed, well-groomed  EYES: Conjunctiva and sclera clear  ENMT: Moist oral mucosa, no pharyngeal injection or exudates; normal dentition  NECK: Supple, no palpable masses; no thyromegaly  RESPIRATORY: Normal respiratory effort; lungs are clear to auscultation bilaterally  CARDIOVASCULAR: Regular rate and rhythm, normal S1 and S2, no murmur/rub/gallop; No lower extremity edema; Peripheral pulses are 2+ bilaterally  ABDOMEN: Soft, nontender to palpation, normoactive bowel sounds, no rebound/guarding  MUSCULOSKELETAL: No clubbing or cyanosis of digits; no joint swelling or tenderness to palpation  PSYCH: A+O to person, place, and time; affect appropriate  NEUROLOGY: CN 2-12 are intact and symmetric; no gross sensory deficits   SKIN: No rashes; no palpable lesions    LABS:                        9.4    5.60  )-----------( 291      ( 15 Dec 2024 05:41 )             29.0     12-15    139  |  96[L]  |  11  ----------------------------<  222[H]  4.1   |  22  |  0.76    Ca    9.0      15 Dec 2024 18:51  Phos  2.8     12-15  Mg     2.00     12-15            Urinalysis Basic - ( 15 Dec 2024 18:51 )    Color: x / Appearance: x / SG: x / pH: x  Gluc: 222 mg/dL / Ketone: x  / Bili: x / Urobili: x   Blood: x / Protein: x / Nitrite: x   Leuk Esterase: x / RBC: x / WBC x   Sq Epi: x / Non Sq Epi: x / Bacteria: x        Culture - Blood (collected 14 Dec 2024 01:33)  Source: .Blood BLOOD  Preliminary Report (15 Dec 2024 11:01):    No growth at 24 hours    Culture - Blood (collected 14 Dec 2024 01:20)  Source: .Blood BLOOD  Preliminary Report (15 Dec 2024 11:01):    No growth at 24 hours        RADIOLOGY & ADDITIONAL TESTS: No new imaging  Results Reviewed: Yes  Imaging Personally Reviewed:  Electrocardiogram Personally Reviewed:    COORDINATION OF CARE:  Care Discussed with Consultants/Other Providers [Y/N]:  Prior or Outpatient Records Reviewed [Y/N]:   Karthik Garibay M.D  Resident  Department of Medicine  Available on [employer] Teams    ***********************************************************************  Patient is a 54y old  Male who presents with a chief complaint of Abdominal pain (15 Dec 2024 15:24)      SUBJECTIVE / OVERNIGHT EVENTS: Patient seen and examined at bedside. Denied abdominal pain during my interview, and states he will try to start eating more now. Wants yogurt. States he has been passing gas and stool     ADDITIONAL REVIEW OF SYSTEMS:    MEDICATIONS  (STANDING):  chlorhexidine 2% Cloths 1 Application(s) Topical daily  dextrose 5% + lactated ringers. 1000 milliLiter(s) (80 mL/Hr) IV Continuous <Continuous>  dextrose 5%. 1000 milliLiter(s) (50 mL/Hr) IV Continuous <Continuous>  dextrose 5%. 1000 milliLiter(s) (100 mL/Hr) IV Continuous <Continuous>  dextrose 50% Injectable 25 Gram(s) IV Push once  dextrose 50% Injectable 12.5 Gram(s) IV Push once  dextrose 50% Injectable 25 Gram(s) IV Push once  glucagon  Injectable 1 milliGRAM(s) IntraMuscular once  influenza   Vaccine 0.5 milliLiter(s) IntraMuscular once  insulin glargine Injectable (LANTUS) 8 Unit(s) SubCutaneous at bedtime  insulin lispro (ADMELOG) corrective regimen sliding scale   SubCutaneous three times a day before meals  insulin lispro (ADMELOG) corrective regimen sliding scale   SubCutaneous at bedtime  insulin lispro Injectable (ADMELOG) 3 Unit(s) SubCutaneous three times a day before meals  multivitamin 1 Tablet(s) Oral daily  pantoprazole    Tablet 40 milliGRAM(s) Oral before breakfast  thiamine IVPB 500 milliGRAM(s) IV Intermittent every 8 hours    MEDICATIONS  (PRN):  acetaminophen     Tablet .. 650 milliGRAM(s) Oral every 6 hours PRN Mild Pain (1 - 3), Moderate Pain (4 - 6)  dextrose Oral Gel 15 Gram(s) Oral once PRN Blood Glucose LESS THAN 70 milliGRAM(s)/deciliter  melatonin 3 milliGRAM(s) Oral at bedtime PRN Insomnia      CAPILLARY BLOOD GLUCOSE      POCT Blood Glucose.: 239 mg/dL (15 Dec 2024 23:50)  POCT Blood Glucose.: 237 mg/dL (15 Dec 2024 22:01)  POCT Blood Glucose.: 237 mg/dL (15 Dec 2024 18:13)  POCT Blood Glucose.: 176 mg/dL (15 Dec 2024 12:16)  POCT Blood Glucose.: 217 mg/dL (15 Dec 2024 08:36)    I&O's Summary    15 Dec 2024 07:01  -  16 Dec 2024 07:00  --------------------------------------------------------  IN: 1240 mL / OUT: 900 mL / NET: 340 mL        PHYSICAL EXAM:    Vital Signs Last 24 Hrs  T(C): 37.1 (16 Dec 2024 07:04), Max: 37.2 (15 Dec 2024 21:46)  T(F): 98.8 (16 Dec 2024 07:04), Max: 99 (15 Dec 2024 21:46)  HR: 69 (16 Dec 2024 07:04) (69 - 75)  BP: 154/69 (16 Dec 2024 07:04) (139/84 - 154/69)  BP(mean): --  RR: 18 (16 Dec 2024 07:04) (17 - 18)  SpO2: 98% (16 Dec 2024 07:04) (98% - 100%)    Parameters below as of 16 Dec 2024 07:04  Patient On (Oxygen Delivery Method): room air        CONSTITUTIONAL: NAD, well-developed, well-groomed  RESPIRATORY: Normal respiratory effort; lungs are clear to auscultation bilaterally  CARDIOVASCULAR: Regular rate and rhythm, normal S1 and S2, no murmur/rub/gallop; No lower extremity edema; Peripheral pulses are 2+ bilaterally  ABDOMEN: Soft, nontender to palpation, normoactive bowel sounds, no rebound/guarding  MUSCULOSKELETAL: No clubbing or cyanosis of digits; no joint swelling or tenderness to palpation. B/l LE ulcers up to the shin  PSYCH: A+O to person, place, and time; affect appropriate  NEUROLOGY: no gross sensory deficits   SKIN: No rashes; no palpable lesions    LABS:                        9.4    5.60  )-----------( 291      ( 15 Dec 2024 05:41 )             29.0     12-15    139  |  96[L]  |  11  ----------------------------<  222[H]  4.1   |  22  |  0.76    Ca    9.0      15 Dec 2024 18:51  Phos  2.8     12-15  Mg     2.00     12-15            Urinalysis Basic - ( 15 Dec 2024 18:51 )    Color: x / Appearance: x / SG: x / pH: x  Gluc: 222 mg/dL / Ketone: x  / Bili: x / Urobili: x   Blood: x / Protein: x / Nitrite: x   Leuk Esterase: x / RBC: x / WBC x   Sq Epi: x / Non Sq Epi: x / Bacteria: x        Culture - Blood (collected 14 Dec 2024 01:33)  Source: .Blood BLOOD  Preliminary Report (15 Dec 2024 11:01):    No growth at 24 hours    Culture - Blood (collected 14 Dec 2024 01:20)  Source: .Blood BLOOD  Preliminary Report (15 Dec 2024 11:01):    No growth at 24 hours        RADIOLOGY & ADDITIONAL TESTS: No new imaging  Results Reviewed: Yes  Imaging Personally Reviewed:  Electrocardiogram Personally Reviewed:    COORDINATION OF CARE:  Care Discussed with Consultants/Other Providers [Y/N]:  Prior or Outpatient Records Reviewed [Y/N]:

## 2024-12-16 NOTE — DIETITIAN INITIAL EVALUATION ADULT - REASON FOR ADMISSION
Per chart, Pt is 53 y/o M w/ pmh T2DM, gastric bypass who presented with abd pain and nausea for the last 2 days. Admitted for likely starvation ketosis.

## 2024-12-17 DIAGNOSIS — D64.9 ANEMIA, UNSPECIFIED: ICD-10-CM

## 2024-12-17 LAB
ALBUMIN SERPL ELPH-MCNC: 2.7 G/DL — LOW (ref 3.3–5)
ALP SERPL-CCNC: 66 U/L — SIGNIFICANT CHANGE UP (ref 40–120)
ALT FLD-CCNC: 5 U/L — SIGNIFICANT CHANGE UP (ref 4–41)
ANION GAP SERPL CALC-SCNC: 9 MMOL/L — SIGNIFICANT CHANGE UP (ref 7–14)
AST SERPL-CCNC: 9 U/L — SIGNIFICANT CHANGE UP (ref 4–40)
B-OH-BUTYR SERPL-SCNC: 1.8 MMOL/L — HIGH (ref 0–0.4)
BASOPHILS # BLD AUTO: 0.03 K/UL — SIGNIFICANT CHANGE UP (ref 0–0.2)
BASOPHILS NFR BLD AUTO: 0.6 % — SIGNIFICANT CHANGE UP (ref 0–2)
BILIRUB SERPL-MCNC: 0.2 MG/DL — SIGNIFICANT CHANGE UP (ref 0.2–1.2)
BUN SERPL-MCNC: 8 MG/DL — SIGNIFICANT CHANGE UP (ref 7–23)
CALCIUM SERPL-MCNC: 8.6 MG/DL — SIGNIFICANT CHANGE UP (ref 8.4–10.5)
CHLORIDE SERPL-SCNC: 97 MMOL/L — LOW (ref 98–107)
CO2 SERPL-SCNC: 30 MMOL/L — SIGNIFICANT CHANGE UP (ref 22–31)
CREAT SERPL-MCNC: 0.67 MG/DL — SIGNIFICANT CHANGE UP (ref 0.5–1.3)
EGFR: 112 ML/MIN/1.73M2 — SIGNIFICANT CHANGE UP
EOSINOPHIL # BLD AUTO: 0.09 K/UL — SIGNIFICANT CHANGE UP (ref 0–0.5)
EOSINOPHIL NFR BLD AUTO: 1.9 % — SIGNIFICANT CHANGE UP (ref 0–6)
FERRITIN SERPL-MCNC: 161 NG/ML — SIGNIFICANT CHANGE UP (ref 30–400)
GLUCOSE BLDC GLUCOMTR-MCNC: 168 MG/DL — HIGH (ref 70–99)
GLUCOSE BLDC GLUCOMTR-MCNC: 172 MG/DL — HIGH (ref 70–99)
GLUCOSE BLDC GLUCOMTR-MCNC: 194 MG/DL — HIGH (ref 70–99)
GLUCOSE BLDC GLUCOMTR-MCNC: 227 MG/DL — HIGH (ref 70–99)
GLUCOSE BLDC GLUCOMTR-MCNC: 263 MG/DL — HIGH (ref 70–99)
GLUCOSE SERPL-MCNC: 210 MG/DL — HIGH (ref 70–99)
HCT VFR BLD CALC: 27.1 % — LOW (ref 39–50)
HGB BLD-MCNC: 8.9 G/DL — LOW (ref 13–17)
IANC: 2.62 K/UL — SIGNIFICANT CHANGE UP (ref 1.8–7.4)
IMM GRANULOCYTES NFR BLD AUTO: 0.4 % — SIGNIFICANT CHANGE UP (ref 0–0.9)
IRON SATN MFR SERPL: 18 % — SIGNIFICANT CHANGE UP (ref 14–50)
IRON SATN MFR SERPL: 26 UG/DL — LOW (ref 45–165)
LYMPHOCYTES # BLD AUTO: 1.36 K/UL — SIGNIFICANT CHANGE UP (ref 1–3.3)
LYMPHOCYTES # BLD AUTO: 28.8 % — SIGNIFICANT CHANGE UP (ref 13–44)
MAGNESIUM SERPL-MCNC: 1.7 MG/DL — SIGNIFICANT CHANGE UP (ref 1.6–2.6)
MCHC RBC-ENTMCNC: 28.3 PG — SIGNIFICANT CHANGE UP (ref 27–34)
MCHC RBC-ENTMCNC: 32.8 G/DL — SIGNIFICANT CHANGE UP (ref 32–36)
MCV RBC AUTO: 86 FL — SIGNIFICANT CHANGE UP (ref 80–100)
MONOCYTES # BLD AUTO: 0.6 K/UL — SIGNIFICANT CHANGE UP (ref 0–0.9)
MONOCYTES NFR BLD AUTO: 12.7 % — SIGNIFICANT CHANGE UP (ref 2–14)
NEUTROPHILS # BLD AUTO: 2.62 K/UL — SIGNIFICANT CHANGE UP (ref 1.8–7.4)
NEUTROPHILS NFR BLD AUTO: 55.6 % — SIGNIFICANT CHANGE UP (ref 43–77)
NRBC # BLD: 0 /100 WBCS — SIGNIFICANT CHANGE UP (ref 0–0)
NRBC # FLD: 0 K/UL — SIGNIFICANT CHANGE UP (ref 0–0)
PHOSPHATE SERPL-MCNC: 2.4 MG/DL — LOW (ref 2.5–4.5)
PLATELET # BLD AUTO: 257 K/UL — SIGNIFICANT CHANGE UP (ref 150–400)
POTASSIUM SERPL-MCNC: 3.6 MMOL/L — SIGNIFICANT CHANGE UP (ref 3.5–5.3)
POTASSIUM SERPL-SCNC: 3.6 MMOL/L — SIGNIFICANT CHANGE UP (ref 3.5–5.3)
PROT SERPL-MCNC: 5.2 G/DL — LOW (ref 6–8.3)
RBC # BLD: 3.15 M/UL — LOW (ref 4.2–5.8)
RBC # BLD: 3.15 M/UL — LOW (ref 4.2–5.8)
RBC # FLD: 11.9 % — SIGNIFICANT CHANGE UP (ref 10.3–14.5)
RETICS #: 45.4 K/UL — SIGNIFICANT CHANGE UP (ref 25–125)
RETICS/RBC NFR: 1.4 % — SIGNIFICANT CHANGE UP (ref 0.5–2.5)
SODIUM SERPL-SCNC: 136 MMOL/L — SIGNIFICANT CHANGE UP (ref 135–145)
TIBC SERPL-MCNC: 142 UG/DL — LOW (ref 220–430)
TRANSFERRIN SERPL-MCNC: 137 MG/DL — LOW (ref 200–360)
UIBC SERPL-MCNC: 116 UG/DL — SIGNIFICANT CHANGE UP (ref 110–370)
WBC # BLD: 4.72 K/UL — SIGNIFICANT CHANGE UP (ref 3.8–10.5)
WBC # FLD AUTO: 4.72 K/UL — SIGNIFICANT CHANGE UP (ref 3.8–10.5)

## 2024-12-17 PROCEDURE — 99222 1ST HOSP IP/OBS MODERATE 55: CPT | Mod: GC

## 2024-12-17 PROCEDURE — 76700 US EXAM ABDOM COMPLETE: CPT | Mod: 26

## 2024-12-17 PROCEDURE — 99254 IP/OBS CNSLTJ NEW/EST MOD 60: CPT | Mod: GC

## 2024-12-17 PROCEDURE — 99232 SBSQ HOSP IP/OBS MODERATE 35: CPT

## 2024-12-17 PROCEDURE — 99232 SBSQ HOSP IP/OBS MODERATE 35: CPT | Mod: GC

## 2024-12-17 RX ORDER — 0.9 % SODIUM CHLORIDE 0.9 %
1000 INTRAVENOUS SOLUTION INTRAVENOUS
Refills: 0 | Status: DISCONTINUED | OUTPATIENT
Start: 2024-12-17 | End: 2024-12-18

## 2024-12-17 RX ORDER — INSULIN GLARGINE 100 [IU]/ML
15 INJECTION, SOLUTION SUBCUTANEOUS AT BEDTIME
Refills: 0 | Status: DISCONTINUED | OUTPATIENT
Start: 2024-12-17 | End: 2024-12-19

## 2024-12-17 RX ORDER — LANOLIN ALCOHOL/MO/W.PET/CERES
100 CREAM (GRAM) TOPICAL DAILY
Refills: 0 | Status: DISCONTINUED | OUTPATIENT
Start: 2024-12-17 | End: 2024-12-19

## 2024-12-17 RX ORDER — PANTOPRAZOLE SODIUM 40 MG/1
40 TABLET, DELAYED RELEASE ORAL
Refills: 0 | Status: DISCONTINUED | OUTPATIENT
Start: 2024-12-17 | End: 2024-12-17

## 2024-12-17 RX ORDER — INSULIN GLARGINE 100 [IU]/ML
10 INJECTION, SOLUTION SUBCUTANEOUS
Refills: 0 | DISCHARGE

## 2024-12-17 RX ORDER — PANTOPRAZOLE SODIUM 40 MG/1
40 TABLET, DELAYED RELEASE ORAL
Refills: 0 | Status: DISCONTINUED | OUTPATIENT
Start: 2024-12-17 | End: 2024-12-19

## 2024-12-17 RX ORDER — SODIUM,POTASSIUM PHOSPHATES 278-250MG
1 POWDER IN PACKET (EA) ORAL ONCE
Refills: 0 | Status: COMPLETED | OUTPATIENT
Start: 2024-12-17 | End: 2024-12-17

## 2024-12-17 RX ORDER — SUCRALFATE 1 G/1
1 TABLET ORAL
Refills: 0 | Status: DISCONTINUED | OUTPATIENT
Start: 2024-12-17 | End: 2024-12-19

## 2024-12-17 RX ORDER — ALOGLIPTIN 12.5 MG/1
1 TABLET, FILM COATED ORAL
Refills: 0 | DISCHARGE

## 2024-12-17 RX ADMIN — PANTOPRAZOLE SODIUM 40 MILLIGRAM(S): 40 TABLET, DELAYED RELEASE ORAL at 18:29

## 2024-12-17 RX ADMIN — Medication 80 MILLILITER(S): at 10:26

## 2024-12-17 RX ADMIN — METOCLOPRAMIDE HYDROCHLORIDE 5 MILLIGRAM(S): 10 TABLET ORAL at 06:59

## 2024-12-17 RX ADMIN — Medication 80 MILLILITER(S): at 06:58

## 2024-12-17 RX ADMIN — SUCRALFATE 1 GRAM(S): 1 TABLET ORAL at 09:38

## 2024-12-17 RX ADMIN — Medication 40 MILLIGRAM(S): at 22:55

## 2024-12-17 RX ADMIN — Medication 7 UNIT(S): at 18:34

## 2024-12-17 RX ADMIN — METOCLOPRAMIDE HYDROCHLORIDE 5 MILLIGRAM(S): 10 TABLET ORAL at 14:19

## 2024-12-17 RX ADMIN — SUCRALFATE 1 GRAM(S): 1 TABLET ORAL at 18:29

## 2024-12-17 RX ADMIN — CHLORHEXIDINE GLUCONATE 1 APPLICATION(S): 1.2 RINSE ORAL at 18:36

## 2024-12-17 RX ADMIN — ACETAMINOPHEN 500MG 650 MILLIGRAM(S): 500 TABLET, COATED ORAL at 00:20

## 2024-12-17 RX ADMIN — Medication 100 MILLIGRAM(S): at 14:19

## 2024-12-17 RX ADMIN — Medication 3: at 09:22

## 2024-12-17 RX ADMIN — PANTOPRAZOLE SODIUM 40 MILLIGRAM(S): 40 TABLET, DELAYED RELEASE ORAL at 06:59

## 2024-12-17 RX ADMIN — Medication 7 UNIT(S): at 15:14

## 2024-12-17 RX ADMIN — Medication 1: at 14:20

## 2024-12-17 RX ADMIN — INSULIN GLARGINE 15 UNIT(S): 100 INJECTION, SOLUTION SUBCUTANEOUS at 22:54

## 2024-12-17 RX ADMIN — METOCLOPRAMIDE HYDROCHLORIDE 5 MILLIGRAM(S): 10 TABLET ORAL at 22:55

## 2024-12-17 RX ADMIN — Medication 5 UNIT(S): at 09:22

## 2024-12-17 RX ADMIN — Medication 80 MILLILITER(S): at 22:55

## 2024-12-17 RX ADMIN — Medication 1 PACKET(S): at 08:03

## 2024-12-17 RX ADMIN — Medication 1: at 18:33

## 2024-12-17 RX ADMIN — Medication 1 TABLET(S): at 14:19

## 2024-12-17 RX ADMIN — Medication 1 APPLICATION(S): at 07:00

## 2024-12-17 RX ADMIN — Medication 105 MILLIGRAM(S): at 06:58

## 2024-12-17 NOTE — PROGRESS NOTE ADULT - SUBJECTIVE AND OBJECTIVE BOX
Chief Complaint: DM type 2     Interval Events: FS above goal yesterday. Fasting glucose above goal. No N/V or abdominal pain.     MEDICATIONS  (STANDING):  atorvastatin 40 milliGRAM(s) Oral at bedtime  chlorhexidine 2% Cloths 1 Application(s) Topical daily  dextrose 5% + lactated ringers. 1000 milliLiter(s) (80 mL/Hr) IV Continuous <Continuous>  dextrose 5%. 1000 milliLiter(s) (50 mL/Hr) IV Continuous <Continuous>  dextrose 5%. 1000 milliLiter(s) (100 mL/Hr) IV Continuous <Continuous>  dextrose 50% Injectable 25 Gram(s) IV Push once  dextrose 50% Injectable 12.5 Gram(s) IV Push once  dextrose 50% Injectable 25 Gram(s) IV Push once  glucagon  Injectable 1 milliGRAM(s) IntraMuscular once  influenza   Vaccine 0.5 milliLiter(s) IntraMuscular once  insulin glargine Injectable (LANTUS) 12 Unit(s) SubCutaneous at bedtime  insulin lispro (ADMELOG) corrective regimen sliding scale   SubCutaneous three times a day before meals  insulin lispro (ADMELOG) corrective regimen sliding scale   SubCutaneous at bedtime  insulin lispro Injectable (ADMELOG) 5 Unit(s) SubCutaneous three times a day before meals  metoclopramide 5 milliGRAM(s) Oral every 8 hours  multivitamin 1 Tablet(s) Oral daily  mupirocin 2% Nasal 1 Application(s) Both Nostrils two times a day  mupirocin 2% Ointment 1 Application(s) Topical <User Schedule>  pantoprazole    Tablet 40 milliGRAM(s) Oral before breakfast  sucralfate 1 Gram(s) Oral four times a day    MEDICATIONS  (PRN):  acetaminophen     Tablet .. 650 milliGRAM(s) Oral every 6 hours PRN Mild Pain (1 - 3), Moderate Pain (4 - 6)  dextrose Oral Gel 15 Gram(s) Oral once PRN Blood Glucose LESS THAN 70 milliGRAM(s)/deciliter  melatonin 3 milliGRAM(s) Oral at bedtime PRN Insomnia    Allergies  No Known Allergies    Review of Systems:  Constitutional: No fever/chills   Eyes: No blurry vision  Cardiovascular: No chest pain, no palpitations  Respiratory: No SOB, no cough  GI: No nausea, vomiting or abdominal pain  : No dysuria  Endocrine: no polyuria, polydipsia    VITALS: T(C): 37.1 (12-17-24 @ 06:30)  T(F): 98.8 (12-17-24 @ 06:30), Max: 98.8 (12-17-24 @ 06:30)  HR: 63 (12-17-24 @ 06:30) (63 - 74)  BP: 153/85 (12-17-24 @ 06:30) (135/71 - 158/71)  RR:  (17 - 18)  SpO2:  (97% - 99%)  Wt(kg): --    Physical Exam:   GENERAL: NAD, well-groomed, well-developed  EYES: No proptosis, no lid lag, anicteric  HEENT:  Atraumatic, Normocephalic, moist mucous membranes  RESPIRATORY: non labored breathing   GI: Soft, nontender, non distended  NEURO: A&Ox3    CAPILLARY BLOOD GLUCOSE  POCT Blood Glucose.: 168 mg/dL (17 Dec 2024 12:20)  POCT Blood Glucose.: 263 mg/dL (17 Dec 2024 08:53)  POCT Blood Glucose.: 217 mg/dL (16 Dec 2024 22:33)  POCT Blood Glucose.: 260 mg/dL (16 Dec 2024 18:05)      12-17    136  |  97[L]  |  8   ----------------------------<  210[H]  3.6   |  30  |  0.67    eGFR: 112    Ca    8.6      12-17  Mg     1.70     12-17  Phos  2.4     12-17    TPro  5.2[L]  /  Alb  2.7[L]  /  TBili  0.2  /  DBili  x   /  AST  9   /  ALT  5   /  AlkPhos  66  12-17      A1C with Estimated Average Glucose Result: 12.5 % (12-14-24 @ 00:48)

## 2024-12-17 NOTE — PROGRESS NOTE ADULT - PROBLEM SELECTOR PLAN 2
gastroparesis vs small bowel overgrowth vs unlikely structural complication of gastric bypass  - Hepatobiliary labs wnl  - Burning pain  - CTAP 12/14: only showed diverticulosis and post surgical changes of stomach    Plan:  - Reglan 5 q8h for 2 days  - Sucralfate 4x today with meals and at night - s/p 1L NS and 10u Lantus in ED  - On admission BS not elevated but HCO3 22, BHB 6.5, A1c 12.5%, pH 7.41, lactate 1.2. UA positive for ketones and glucose  - C- peptide low at 0.7  - Possibly following with Dr. Camila Hernandez for endocrinology.  - Full resolution limited by patient's limited oral intake 2/2 abd pain as above    Plan:  - Endocrinology following. Awaiting recs  - Continue D5 in setting of decreased PO intake  - Med rec to call endo office to confirm outpatient meds  - Lantus 15u qhs   - admelog 7u premeal per endo with insulin scales  - f/u on anti ISLET, anti SHEKHAR, zn transporter 8, IA -2 antibody   - CC diet, PPI  - Start Atorvastatin 40mg qD (ASCVD score elevated)

## 2024-12-17 NOTE — PROGRESS NOTE ADULT - ASSESSMENT
54M PMHx DM (only on Lantus 10 daily but recently non-adherent), hx of gastric bypass 6mo ago in [place], recent Covid diagnosis, p/w 6 days of abd pain/nausea, found to have poorly controlled DM (A1c 12.5%), with hyperglycemia, elevated AG, BHB 6.5, glucosuria and ketonuria as well as non-healing ulcers to bilateral lower extremities.   Endocrine called for DM management.       DM type 2   He is currently on Lantus 10 units at home, as well as glimepiride. ? (needs med [facility])  A1c 12.5      Inpatient plan   - FS goal 100-180 mg/dl   - FS above goal  - Increase Lantus to 15 units at bedtime. DO NOT HOLD IF NPO.   - increase Admelog to 7 units TID AC. Hold if not eating/NPO.   - continue low Admelog correctional scale TID AC  - continue separate low Admelog correctional scale at HS  - FS TID AC & HS ---> q6 if NPO   - hypoglycemia protocol PRN   - consistent carbohydrate diet  - RD consult  - C-peptide 0.7 with Serum Glucose of 250 - unable to produce endogenous insulin  - follow up anti ISLET, anti SHEKHAR, zn transporter 8, IA -2 antibody  ---> specimen received      Discharge plan   - Discharge on: Lantus +/- bolus insulin depending on inpatient requirements  - Stop glimepiride  - please ensure patient has a working glucometer and supplies - test strip, lancets, alcohol pads and insulin pen needles.   - Please send prescription for glucose tablets 4G (take 4 tablets) or 15G tablets for blood sugar less than 70 mG/dL, repeat fingerstick in 15 minutes. Call your provider for dose adjustment if needed.   - follow up at [facility] [place]:   [address]  [place] [address]  [phone]         HTN  - goal BP in DM <130/80  - outpt mc/cr ratio  - management per primary team       HLD  - LDL goal <70  -    - continue statin   - management per primary team         TAVON Forde-BC  Nurse Practitioner  Division of Endocrinology & Diabetes  available via  Teams

## 2024-12-17 NOTE — PROGRESS NOTE ADULT - PROBLEM SELECTOR PLAN 1
- s/p 1L NS and 10u Lantus in ED  - On admission BS not elevated but HCO3 22, BHB 6.5, A1c 12.5%, pH 7.41, lactate 1.2. UA positive for ketones and glucose      Plan:  - Endocrinology following. Awaiting recs  - Continue D5 in setting of decreased PO intake  - Started on 8u Lantus  - admelog 3u premeal per endo with insulin scales  - f/u on cpeptide, anti ISLET, anti SHEKHAR, zn transporter 8, IA -2 antibody   - CC diet, PPI  - Start Atorvastatin 40mg qD (ASCVD score elevated) gastroparesis vs small bowel overgrowth vs unlikely structural complication of gastric bypass  - Hepatobiliary labs wnl  - Burning pain, possibly gastric ulcers at gastric bypass incision sites  - CTAP 12/14: only showed diverticulosis and post surgical changes of stomach    Plan:  - Reglan 5 q8h for 2 days  - Sucralfate   - Continue PPI  - GI consulted. Appreciate recs gastroparesis vs small bowel overgrowth vs unlikely structural complication of gastric bypass  - Hepatobiliary labs wnl  - Burning pain, possibly gastric ulcers at gastric bypass incision sites  - CTAP 12/14: only showed diverticulosis and post surgical changes of stomach  - US RUQ 12/17: Distended gallbladder. No cholelithiasis. Nopericholecystic fluid. No biliary ductal dilatation. Mild left hydronephrosis.    Plan:  - GI consulted. Appreciate recs  - Reglan 5 q8h for 2 days  - Sucralfate   - Continue PPI

## 2024-12-17 NOTE — PHARMACOTHERAPY INTERVENTION NOTE - COMMENTS
Medication history is incomplete. Unable to verify patient's medication list with two sources. Discrepancies noted in provider handoff. Please call spectra [phone] if you have any questions.    -Unable to verify recent fill for insulin.  As per pharmacy Tresiba was last filled 6/2024.  Verified with [facility] and [facility].  
Medication history is incomplete. Unable to verify patient's medication list with two sources. Outpatient Pharmacy closed, will follow up when they reopen.
Pt educated on A1c, goal A1c, type of diabetes (rule out FRANCA), basal/bolus insulin pen administration, hypoglycemia and treatment, healthy plate (states he only eats protein and no carbohydrates), goal blood glucose, and when to check BG, pt would not perform return demonstration stating he knows how to do it but he allowed me to show him the proper steps and verbalized understanding. Reviewed where to inject insulin (abdomen, outer thighs; states he uses his arms too and recommended only the back of the arms), rotating injection site to prevent lipodystrophy, and sharps disposal (was throwing away in regular garbage - informed him to place in hard plastic container first before throwing away). Pt encouraged for outpt f/u with medicine (states he has PCP) and endocrine.  
Medication list as per [facility] chart notes (12/2/24)    **Chart notes faxed were placed in patient's binder    Stop:   Alogliptan 25 mg daily  Start:   Ozempic 0.25mg weekly for 4 weeks, then 0.5mg weekly...(as per pharmacy was canceled ?)  Continue:  Glipizide ER 10 mg daily  Tresiba 100unit/ml: 20 units under the skin daily  Vitamin D 50mcg daily

## 2024-12-17 NOTE — PROGRESS NOTE ADULT - PROBLEM SELECTOR PLAN 6
- Fluids: None  - Electrolytes: Will replete to maintain K>4, Phos>3, and Mag>2  - Nutrition: CC diet  - Activity: As tolerated  - DVT Prophylaxis: None  - Stress Ulcer/GI Prophylaxis: PPI  - Disposition: Admit for management - Fluids: None  - Electrolytes: Will replete to maintain K>4, Phos>3, and Mag>2  - Nutrition: CC diet  - Activity: As tolerated  - DVT Prophylaxis: None  - Stress Ulcer/GI Prophylaxis: PPI  - Disposition: Abd pain workup Covid + by RVP  xray clear and patient on RA    Plan:  - Supportive treatment  - isolation

## 2024-12-17 NOTE — CONSULT NOTE ADULT - ATTENDING COMMENTS
No acute surgical intervention  negative ct scan    I have personally interviewed and examined this patient, reviewed pertinent labs and imaging, and discussed the case with colleagues, residents, and physician assistants on B Team rounds.    The active care issues are:  1. bariatric eval    [facility] ([facility]    urgent issues - spectra [id number]  nonurgent issues - [phone]  patient appointments or afterhours - [phone]
54M with hx RYGB in 5/2024 presenting with abdominal pain and starvation ketosis. N/V have resolved, but abdominal pain persists.   PPI and carafate not helping  +COVID-19 infection  CT A/P unrevealing  Dx includes anastomotic ulcer vs dyspepsia  Recommend open capsule PPI, can increase to BID, for better absorption  Pt defers endoscopic evaluation at this time

## 2024-12-17 NOTE — PROGRESS NOTE ADULT - PROBLEM SELECTOR PLAN 4
CT AP w/ oral contrast showed edematous state, s/p Jennifer en Y gastric bypass with no bowel obstruction, colonic diverticulitis  - Surgery consulted and no acute interventions Iron 26, TIBC 142, Transferrin decreased, Ferritin 161    Likely iron deficiency anemia     - Plan for outpatient iron supplementation

## 2024-12-17 NOTE — PROGRESS NOTE ADULT - PROBLEM SELECTOR PLAN 3
- B/l LE ulcerations chronic. L>R  - never evaluated by podiatry. These ulcers likely in setting of DM, but no sensory loss in LE  - ESR: 34, CRP 22  - Blood Cx NGTD  - Low concern for osteo or cellulitis given labs and exam. Low concern for cellulitis  Plan:  - Surgery consulted (never came to see patient)  - wound care consulted and podiatry consulted per wound care recs  - F/u [facility] INDY w/ PVR (with toe pressures) per surgery  - Hold off on antibiotics for now. Will observe - B/l LE ulcerations chronic. L>R  - never evaluated by podiatry. These ulcers likely in setting of DM, but no sensory loss in LE  - ESR: 34, CRP 22  - Blood Cx NGTD  - INDY and PVR wnl  - Low concern for osteo or cellulitis given labs and exam. Low concern for cellulitis  Plan:  - wound care consulted and podiatry consulted per wound care recs.   - Hold off on antibiotics for now. Will observe - B/l LE ulcerations chronic. L>R  - never evaluated by podiatry. These ulcers likely in setting of DM, but no sensory loss in LE  - ESR: 34, CRP 22  - Blood Cx NGTD  - INDY and PVR wnl  - Low concern for osteo or cellulitis given labs and exam. Low concern for cellulitis  Plan:  - management per wound care and podiatry recs  - Hold off on antibiotics for now. Will observe

## 2024-12-17 NOTE — CONSULT NOTE ADULT - SUBJECTIVE AND OBJECTIVE BOX
INITIAL GI CONSULTATION  HPI:  54-year-old with past medical history of diabetes on Lantus, gastric bypass in [place] 2024 who presented with abdominal pain and nausea for 2 days, admitted for starvation ketosis, now clinically improving.  GI consulted for abdominal pain not responsive to PPI and Carafate.    Has remained hemodynamically stable with blood pressure 153/85, heart rate 63, afebrile.  Labs notable for normocytic anemia to 8.9 with platelets 257, mildly low albumin at 2.7 and Phos of 2.4.  T on  showed status post Jennifer-en-Y gastric bypass, no bowel obstruction, colonic diverticulosis, there was also note of diffuse edema throughout the entirety of the peritoneal fat and peripheral subcutaneous soft tissue consistent with underlying edematous state as well as small volume ascites.               FamHx: ***no h/o GI malignancies known  PMH/PSH:  PAST MEDICAL & SURGICAL HISTORY:  Diabetes type 2, controlled      History of Jennifer-en-Y gastric bypass          MEDS:  MEDICATIONS  (STANDING):  atorvastatin 40 milliGRAM(s) Oral at bedtime  chlorhexidine 2% Cloths 1 Application(s) Topical daily  dextrose 5% + lactated ringers. 1000 milliLiter(s) (80 mL/Hr) IV Continuous <Continuous>  dextrose 5%. 1000 milliLiter(s) (50 mL/Hr) IV Continuous <Continuous>  dextrose 5%. 1000 milliLiter(s) (100 mL/Hr) IV Continuous <Continuous>  dextrose 50% Injectable 25 Gram(s) IV Push once  dextrose 50% Injectable 12.5 Gram(s) IV Push once  dextrose 50% Injectable 25 Gram(s) IV Push once  glucagon  Injectable 1 milliGRAM(s) IntraMuscular once  influenza   Vaccine 0.5 milliLiter(s) IntraMuscular once  insulin glargine Injectable (LANTUS) 12 Unit(s) SubCutaneous at bedtime  insulin lispro (ADMELOG) corrective regimen sliding scale   SubCutaneous three times a day before meals  insulin lispro (ADMELOG) corrective regimen sliding scale   SubCutaneous at bedtime  insulin lispro Injectable (ADMELOG) 5 Unit(s) SubCutaneous three times a day before meals  metoclopramide 5 milliGRAM(s) Oral every 8 hours  multivitamin 1 Tablet(s) Oral daily  mupirocin 2% Nasal 1 Application(s) Both Nostrils two times a day  mupirocin 2% Ointment 1 Application(s) Topical <User Schedule>  pantoprazole    Tablet 40 milliGRAM(s) Oral before breakfast  sucralfate 1 Gram(s) Oral four times a day    MEDICATIONS  (PRN):  acetaminophen     Tablet .. 650 milliGRAM(s) Oral every 6 hours PRN Mild Pain (1 - 3), Moderate Pain (4 - 6)  dextrose Oral Gel 15 Gram(s) Oral once PRN Blood Glucose LESS THAN 70 milliGRAM(s)/deciliter  melatonin 3 milliGRAM(s) Oral at bedtime PRN Insomnia    Allergies    No Known Allergies    Intolerances          ______________________________________________________________________  PHYSICAL EXAM:  T(C): 37.1 (24 @ 06:30), Max: 37.1 (24 @ 06:30)  HR: 63 (24 @ 06:30)  BP: 153/85 (24 @ 06:30)  RR: 17 (24 @ 06:30)  SpO2: 97% (24 @ 06:30)  Wt(kg): --      --------------------------------------------------------  IN:    dextrose 5% + lactated ringers: 400 mL    IV PiggyBack: 200 mL    Oral Fluid: 720 mL  Total IN: 1320 mL    OUT:    Voided (mL): 1900 mL  Total OUT: 1900 mL    Total NET: -580 mL        GEN: NAD, normocephalic  CVS: Normal rate, HD stable  CHEST: No signs of respiratory distress, breathing comfortably, no accessory muscle usage  ABD: soft , nontender, nondistended, bowel sounds present  EXTR: no cyanosis, no clubbing, no edema  NEURO: Awake and alert, conversant  SKIN:  warm;  non icteric  \      Labs/Imaging reviewed.                        8.9    4.72  )-----------( 257      ( 17 Dec 2024 05:30 )             27.1     Last Hb:Hemoglobin: 8.9 g/dL (24 @ 05:30)  Hemoglobin: 10.0 g/dL (24 @ 06:57)  Hemoglobin: 9.4 g/dL (12-15-24 @ 05:41)               136   |  97    |  8                  Ca: 8.6    BMP:   ----------------------------< 210    M.70  (24 @ 05:30)             3.6    |  30    | 0.67               Ph: 2.4      LFT:     TPro: 5.2 / Alb: 2.7 / TBili: 0.2 / DBili: x / AST: 9 / ALT: 5 / AlkPhos: 66   (24 @ 05:30)    Creatinine: 0.67 mg/dL  Creatinine: 0.67 mg/dL  Creatinine: 0.76 mg/dL      BUN/Cr: Blood Urea Nitrogen: 8 mg/dL (24 @ 05:30)  /Creatinine: 0.67 mg/dL (24 @ 05:30)    AST/ALTAspartate Aminotransferase (AST/SGOT): 9 U/L (24 @ 05:30)  /Alanine Aminotransferase (ALT/SGPT): 5 U/L (24 @ 05:30)    ALP Alkaline Phosphatase: 66 U/L (24 @ 05:30)    T/Dbili /  INR:     EGD/[place]:      Imaging:  US Abdomen Complete:   ACC: [id number] EXAM:  US ABDOMEN COMPLETE   ORDERED BY: ABE HERNANDEZ     PROCEDURE DATE:  2024          INTERPRETATION:  CLINICAL INFORMATION: Right upper quadrant pain.    COMPARISON: CT abdomen pelvis 2024.    TECHNIQUE: Sonography of the abdomen. Exam is limited due to overlying   bowel gas.    FINDINGS:  Liver: Within normal limits.  Bile ducts: Normal caliber. Common bile duct measures 5 mm.  Gallbladder: Distended. No cholelithiasis. No pericholecystic fluid.  Pancreas: Poorly visualized.  Spleen: 10.7 cm. Within normal limits.  Right kidney: 12.0 cm. No hydronephrosis.  Left kidney: 11.9 cm. Mild hydronephrosis.  Ascites: None.  Miscellaneous: Right pleural effusion.    IMPRESSION:  Distended gallbladder. No cholelithiasis. Nopericholecystic fluid.  No biliary ductal dilatation.  Mild left hydronephrosis.    --- End of Report ---          ANN BRADLEY MD; Resident Radiologist  This document has been electronically signed.  INDU JACOBS MD; Attending Radiologist  This document has been electronically signed. Dec 17 2024 10:56AM (24 @ 10:22)  CT Abdomen and Pelvis w/ Oral Cont and w/ IV Cont:   ACC: [id number] EXAM:  CT ABDOMEN AND PELVIS OC IC   ORDERED BY: LONNY CAMPOS     PROCEDURE DATE:  2024          INTERPRETATION:  CLINICAL INFORMATION: Abdominal pain. History of gastric   bypass.    COMPARISON: None.    CONTRAST/COMPLICATIONS:  IV Contrast: Omnipaque 350  90 cc administered   10 cc discarded  Oral Contrast: Omnipaque 300   Fruit 2o  .    PROCEDURE:  CT of the Abdomen and Pelvis was performed.  Sagittal and coronal reformats were performed.    FINDINGS:  LOWER CHEST: Coronary artery calcifications. Small right pleural effusion.    LIVER: Within normal limits.  BILE DUCTS: Normal caliber.  GALLBLADDER: Within normal limits.  SPLEEN: Within normal limits.  PANCREAS: Within normal limits.  ADRENALS: Within normal limits.  KIDNEYS/URETERS: Within normal limits.    BLADDER: Circumferential wall thickening.  REPRODUCTIVE ORGANS: Prostate is enlarged.    BOWEL: Status post Jennifer-en-Y gastric bypass. No bowel obstruction.   Appendix is normal. Colonic diverticulosis.  PERITONEUM/RETROPERITONEUM: Diffuse edema throughout the entirety of the   peritoneal fat and peripheral subcutaneous soft tissues; consistent with   an underlying edematous state. Small volume (trace) ascites.  VESSELS: Atherosclerotic changes.  LYMPH NODES: No lymphadenopathy.  ABDOMINAL WALL: Within normal limits.  BONES: Within normal limits.    IMPRESSION:  1.  Diffuse edematous changes throughout the peritoneal fat and   peripheral subcutaneous soft tissues; likely related to an underlying   edematous state.  2.  Status post Jennifer-en-Y gastric bypass. No bowel obstruction. No CT   evident complications.  3.  Colonic diverticulosis.    --- End of Report ---       INITIAL GI CONSULTATION  HPI:  54-year-old with past medical history of diabetes on Lantus, gastric bypass in [place] 2024 who presented with abdominal pain and nausea for 2 days, admitted for starvation ketosis, now clinically improving.  GI consulted for abdominal pain not responsive to PPI and Carafate.    He reports onset of abdominal pain that started 5 days ago.  Pain is described as burning in nature, worse after p.o. intake, and last about 2 hours.  PPI and Carafate have not helped but the pain medications in the hospital have helped him.  He reports similar pain in the past when he was hyperglycemic and it felt similar to this episode, but in the past the episode resolved much more quickly.  He denies any associated dysphagia diarrhea hematochezia or melena.  Endoscopy was last done before his gastric bypass, none in our system.  He denies any NSAIDs, or current smoking.    Has remained hemodynamically stable with blood pressure 153/85, heart rate 63, afebrile.  Labs notable for normocytic anemia to 8.9 with platelets 257, mildly low albumin at 2.7 and Phos of 2.4. CT on  showed status post Jennifer-en-Y gastric bypass, no bowel obstruction, colonic diverticulosis, there was also note of diffuse edema throughout the entirety of the peritoneal fat and peripheral subcutaneous soft tissue consistent with underlying edematous state as well as small volume ascites.         FamHx: ***no h/o GI malignancies known  PMH/PSH:  PAST MEDICAL & SURGICAL HISTORY:  Diabetes type 2, controlled      History of Jennifer-en-Y gastric bypass          MEDS:  MEDICATIONS  (STANDING):  atorvastatin 40 milliGRAM(s) Oral at bedtime  chlorhexidine 2% Cloths 1 Application(s) Topical daily  dextrose 5% + lactated ringers. 1000 milliLiter(s) (80 mL/Hr) IV Continuous <Continuous>  dextrose 5%. 1000 milliLiter(s) (50 mL/Hr) IV Continuous <Continuous>  dextrose 5%. 1000 milliLiter(s) (100 mL/Hr) IV Continuous <Continuous>  dextrose 50% Injectable 25 Gram(s) IV Push once  dextrose 50% Injectable 12.5 Gram(s) IV Push once  dextrose 50% Injectable 25 Gram(s) IV Push once  glucagon  Injectable 1 milliGRAM(s) IntraMuscular once  influenza   Vaccine 0.5 milliLiter(s) IntraMuscular once  insulin glargine Injectable (LANTUS) 12 Unit(s) SubCutaneous at bedtime  insulin lispro (ADMELOG) corrective regimen sliding scale   SubCutaneous three times a day before meals  insulin lispro (ADMELOG) corrective regimen sliding scale   SubCutaneous at bedtime  insulin lispro Injectable (ADMELOG) 5 Unit(s) SubCutaneous three times a day before meals  metoclopramide 5 milliGRAM(s) Oral every 8 hours  multivitamin 1 Tablet(s) Oral daily  mupirocin 2% Nasal 1 Application(s) Both Nostrils two times a day  mupirocin 2% Ointment 1 Application(s) Topical <User Schedule>  pantoprazole    Tablet 40 milliGRAM(s) Oral before breakfast  sucralfate 1 Gram(s) Oral four times a day    MEDICATIONS  (PRN):  acetaminophen     Tablet .. 650 milliGRAM(s) Oral every 6 hours PRN Mild Pain (1 - 3), Moderate Pain (4 - 6)  dextrose Oral Gel 15 Gram(s) Oral once PRN Blood Glucose LESS THAN 70 milliGRAM(s)/deciliter  melatonin 3 milliGRAM(s) Oral at bedtime PRN Insomnia    Allergies    No Known Allergies    Intolerances          ______________________________________________________________________  PHYSICAL EXAM:  T(C): 37.1 (24 @ 06:30), Max: 37.1 (24 @ 06:30)  HR: 63 (24 @ 06:30)  BP: 153/85 (24 @ 06:30)  RR: 17 (24 @ 06:30)  SpO2: 97% (24 @ 06:30)  Wt(kg): --      --------------------------------------------------------  IN:    dextrose 5% + lactated ringers: 400 mL    IV PiggyBack: 200 mL    Oral Fluid: 720 mL  Total IN: 1320 mL    OUT:    Voided (mL): 1900 mL  Total OUT: 1900 mL    Total NET: -580 mL        GEN: NAD, normocephalic  CVS: Normal rate, HD stable  CHEST: No signs of respiratory distress, breathing comfortably, no accessory muscle usage  ABD: soft , minimally tender, no rebound/guarding  EXTR: no cyanosis, no clubbing, no edema  NEURO: Awake and alert, conversant  SKIN:  warm;  non icteric        Labs/Imaging reviewed.                        8.9    4.72  )-----------( 257      ( 17 Dec 2024 05:30 )             27.1     Last Hb:Hemoglobin: 8.9 g/dL (24 @ 05:30)  Hemoglobin: 10.0 g/dL (24 @ 06:57)  Hemoglobin: 9.4 g/dL (12-15-24 @ 05:41)               136   |  97    |  8                  Ca: 8.6    BMP:   ----------------------------< 210    M.70  (24 @ 05:30)             3.6    |  30    | 0.67               Ph: 2.4      LFT:     TPro: 5.2 / Alb: 2.7 / TBili: 0.2 / DBili: x / AST: 9 / ALT: 5 / AlkPhos: 66   (24 @ 05:30)    Creatinine: 0.67 mg/dL  Creatinine: 0.67 mg/dL  Creatinine: 0.76 mg/dL      BUN/Cr: Blood Urea Nitrogen: 8 mg/dL (24 @ 05:30)  /Creatinine: 0.67 mg/dL (24 @ 05:30)    AST/ALTAspartate Aminotransferase (AST/SGOT): 9 U/L (24 @ 05:30)  /Alanine Aminotransferase (ALT/SGPT): 5 U/L (24 @ 05:30)    ALP Alkaline Phosphatase: 66 U/L (24 @ 05:30)    T/Dbili /  INR:     EGD/[place]:      Imaging:  US Abdomen Complete:   ACC: [id number] EXAM:  US ABDOMEN COMPLETE   ORDERED BY: ABE HERNANDEZ     PROCEDURE DATE:  2024          INTERPRETATION:  CLINICAL INFORMATION: Right upper quadrant pain.    COMPARISON: CT abdomen pelvis 2024.    TECHNIQUE: Sonography of the abdomen. Exam is limited due to overlying   bowel gas.    FINDINGS:  Liver: Within normal limits.  Bile ducts: Normal caliber. Common bile duct measures 5 mm.  Gallbladder: Distended. No cholelithiasis. No pericholecystic fluid.  Pancreas: Poorly visualized.  Spleen: 10.7 cm. Within normal limits.  Right kidney: 12.0 cm. No hydronephrosis.  Left kidney: 11.9 cm. Mild hydronephrosis.  Ascites: None.  Miscellaneous: Right pleural effusion.    IMPRESSION:  Distended gallbladder. No cholelithiasis. Nopericholecystic fluid.  No biliary ductal dilatation.  Mild left hydronephrosis.    --- End of Report ---          ANN BRADLEY MD; Resident Radiologist  This document has been electronically signed.  INDU JACOBS MD; Attending Radiologist  This document has been electronically signed. Dec 17 2024 10:56AM (24 @ 10:22)  CT Abdomen and Pelvis w/ Oral Cont and w/ IV Cont:   ACC: [id number] EXAM:  CT ABDOMEN AND PELVIS OC IC   ORDERED BY: LONNY CAMPOS     PROCEDURE DATE:  2024          INTERPRETATION:  CLINICAL INFORMATION: Abdominal pain. History of gastric   bypass.    COMPARISON: None.    CONTRAST/COMPLICATIONS:  IV Contrast: Omnipaque 350  90 cc administered   10 cc discarded  Oral Contrast: Omnipaque 300   Fruit 2o  .    PROCEDURE:  CT of the Abdomen and Pelvis was performed.  Sagittal and coronal reformats were performed.    FINDINGS:  LOWER CHEST: Coronary artery calcifications. Small right pleural effusion.    LIVER: Within normal limits.  BILE DUCTS: Normal caliber.  GALLBLADDER: Within normal limits.  SPLEEN: Within normal limits.  PANCREAS: Within normal limits.  ADRENALS: Within normal limits.  KIDNEYS/URETERS: Within normal limits.    BLADDER: Circumferential wall thickening.  REPRODUCTIVE ORGANS: Prostate is enlarged.    BOWEL: Status post Jennifer-en-Y gastric bypass. No bowel obstruction.   Appendix is normal. Colonic diverticulosis.  PERITONEUM/RETROPERITONEUM: Diffuse edema throughout the entirety of the   peritoneal fat and peripheral subcutaneous soft tissues; consistent with   an underlying edematous state. Small volume (trace) ascites.  VESSELS: Atherosclerotic changes.  LYMPH NODES: No lymphadenopathy.  ABDOMINAL WALL: Within normal limits.  BONES: Within normal limits.    IMPRESSION:  1.  Diffuse edematous changes throughout the peritoneal fat and   peripheral subcutaneous soft tissues; likely related to an underlying   edematous state.  2.  Status post Jennifer-en-Y gastric bypass. No bowel obstruction. No CT   evident complications.  3.  Colonic diverticulosis.    --- End of Report ---

## 2024-12-17 NOTE — PROGRESS NOTE ADULT - PROBLEM SELECTOR PLAN 7
- Fluids: None  - Electrolytes: Will replete to maintain K>4, Phos>3, and Mag>2  - Nutrition: CC diet  - Activity: As tolerated  - DVT Prophylaxis: None  - Stress Ulcer/GI Prophylaxis: PPI  - Disposition: Abd pain workup

## 2024-12-17 NOTE — CONSULT NOTE ADULT - ASSESSMENT
54-year-old with past medical history of diabetes on Lantus, gastric bypass in [place] 5/2024 who presented with abdominal pain and nausea for 2 days, admitted for starvation ketosis, now clinically improving.  GI consulted for abdominal pain not responsive to PPI and Carafate. 54-year-old with past medical history of diabetes on Lantus, gastric bypass in [place] 5/2024 who presented with abdominal pain and nausea for 2 days, admitted for starvation ketosis, now clinically improving.  GI consulted for abdominal pain not responsive to PPI and Carafate.    #Hx tomasa-en-y gastric bypass  #DMII, starvation ketosis  #Abdominal pain   #DERIK  GI consulted for abdominal pain.Etiology possibly secondary to PUD versus anastomotic ulcer versus functional dyspepsia.  Pain has been unresponsive to PPI.  Lower on the right given history of Tomasa-en-Y, would consider switching to open capsule PPI.  Etiology of DERIK unclear and may warrant EGD/colonoscopy in future.  Patient was offered endoscopy today however declined.  Additionally, he is COVID-positive and would have to be off of precautions before any of the endoscopic evaluation.  Recommendations:  -Please switch to open capsule PPI BID given hx of tomasa-en-y gastric bypass  -C/w carafate  -Can consider EGD if sxs persist and pt willing to have procedure; consideration of concominant colonoscopy could be considered at that time given hx of DERIK  -pt would have to be off of covid precautions prior to EGD  -GI will sign off at this time. Please call with questions or if pain persists and pt would like procedure      Note incomplete until finalized by attending signature/attestation.    Mary Ann Wells  GI/Hepatology Fellow PGY5    NON-URGENT CONSULTS:  Please email [email or web address]  AT NIGHT AND ON WEEKENDS:  Available on [employer] Teams  [phone] ([place] Pager)    For urgent consults, please contact on call GI team. See Amion schedule ([facility]) or [employer] system ([facility])

## 2024-12-17 NOTE — PROGRESS NOTE ADULT - SUBJECTIVE AND OBJECTIVE BOX
Karthik Garibay M.D  Resident  Department of Medicine  Available on [employer] Teams    ***********************************************************************  Patient is a 54y old  Male who presents with a chief complaint of Per chart, Pt is 53 y/o M w/ pmh T2DM, gastric bypass who presented with abd pain and nausea for the last 2 days. Admitted for likely starvation ketosis.       (16 Dec 2024 15:50)      SUBJECTIVE / OVERNIGHT EVENTS: Patient seen and examined at bedside.     ADDITIONAL REVIEW OF SYSTEMS:    MEDICATIONS  (STANDING):  atorvastatin 40 milliGRAM(s) Oral at bedtime  chlorhexidine 2% Cloths 1 Application(s) Topical daily  dextrose 5% + lactated ringers. 1000 milliLiter(s) (80 mL/Hr) IV Continuous <Continuous>  dextrose 5%. 1000 milliLiter(s) (50 mL/Hr) IV Continuous <Continuous>  dextrose 5%. 1000 milliLiter(s) (100 mL/Hr) IV Continuous <Continuous>  dextrose 50% Injectable 25 Gram(s) IV Push once  dextrose 50% Injectable 12.5 Gram(s) IV Push once  dextrose 50% Injectable 25 Gram(s) IV Push once  glucagon  Injectable 1 milliGRAM(s) IntraMuscular once  influenza   Vaccine 0.5 milliLiter(s) IntraMuscular once  insulin glargine Injectable (LANTUS) 12 Unit(s) SubCutaneous at bedtime  insulin lispro (ADMELOG) corrective regimen sliding scale   SubCutaneous three times a day before meals  insulin lispro (ADMELOG) corrective regimen sliding scale   SubCutaneous at bedtime  insulin lispro Injectable (ADMELOG) 5 Unit(s) SubCutaneous three times a day before meals  metoclopramide 5 milliGRAM(s) Oral every 8 hours  multivitamin 1 Tablet(s) Oral daily  mupirocin 2% Nasal 1 Application(s) Both Nostrils two times a day  mupirocin 2% Ointment 1 Application(s) Topical <User Schedule>  pantoprazole    Tablet 40 milliGRAM(s) Oral before breakfast    MEDICATIONS  (PRN):  acetaminophen     Tablet .. 650 milliGRAM(s) Oral every 6 hours PRN Mild Pain (1 - 3), Moderate Pain (4 - 6)  dextrose Oral Gel 15 Gram(s) Oral once PRN Blood Glucose LESS THAN 70 milliGRAM(s)/deciliter  melatonin 3 milliGRAM(s) Oral at bedtime PRN Insomnia      CAPILLARY BLOOD GLUCOSE      POCT Blood Glucose.: 217 mg/dL (16 Dec 2024 22:33)  POCT Blood Glucose.: 260 mg/dL (16 Dec 2024 18:05)  POCT Blood Glucose.: 230 mg/dL (16 Dec 2024 12:35)  POCT Blood Glucose.: 270 mg/dL (16 Dec 2024 08:41)    I&O's Summary    16 Dec 2024 07:01  -  17 Dec 2024 07:00  --------------------------------------------------------  IN: 1320 mL / OUT: 1900 mL / NET: -580 mL        PHYSICAL EXAM:    Vital Signs Last 24 Hrs  T(C): 37.1 (17 Dec 2024 06:30), Max: 37.1 (17 Dec 2024 06:30)  T(F): 98.8 (17 Dec 2024 06:30), Max: 98.8 (17 Dec 2024 06:30)  HR: 63 (17 Dec 2024 06:30) (63 - 74)  BP: 153/85 (17 Dec 2024 06:30) (135/71 - 158/71)  BP(mean): --  RR: 17 (17 Dec 2024 06:30) (17 - 18)  SpO2: 97% (17 Dec 2024 06:30) (97% - 99%)    Parameters below as of 17 Dec 2024 06:30  Patient On (Oxygen Delivery Method): room air        CONSTITUTIONAL: NAD, well-developed, well-groomed  EYES: Conjunctiva and sclera clear  ENMT: Moist oral mucosa, no pharyngeal injection or exudates; normal dentition  NECK: Supple, no palpable masses; no thyromegaly  RESPIRATORY: Normal respiratory effort; lungs are clear to auscultation bilaterally  CARDIOVASCULAR: Regular rate and rhythm, normal S1 and S2, no murmur/rub/gallop; No lower extremity edema; Peripheral pulses are 2+ bilaterally  ABDOMEN: Soft, nontender to palpation, normoactive bowel sounds, no rebound/guarding  MUSCULOSKELETAL: No clubbing or cyanosis of digits; no joint swelling or tenderness to palpation  PSYCH: A+O to person, place, and time; affect appropriate  NEUROLOGY: CN 2-12 are intact and symmetric; no gross sensory deficits   SKIN: No rashes; no palpable lesions    LABS:                        8.9    4.72  )-----------( 257      ( 17 Dec 2024 05:30 )             27.1     12-17    136  |  97[L]  |  8   ----------------------------<  210[H]  3.6   |  30  |  0.67    Ca    8.6      17 Dec 2024 05:30  Phos  2.4     12-17  Mg     1.70     12-17    TPro  5.2[L]  /  Alb  2.7[L]  /  TBili  0.2  /  DBili  x   /  AST  9   /  ALT  5   /  AlkPhos  66  12-17          Urinalysis Basic - ( 17 Dec 2024 05:30 )    Color: x / Appearance: x / SG: x / pH: x  Gluc: 210 mg/dL / Ketone: x  / Bili: x / Urobili: x   Blood: x / Protein: x / Nitrite: x   Leuk Esterase: x / RBC: x / WBC x   Sq Epi: x / Non Sq Epi: x / Bacteria: x          RADIOLOGY & ADDITIONAL TESTS: No new imaging  Results Reviewed: Yes  Imaging Personally Reviewed:  Electrocardiogram Personally Reviewed:    COORDINATION OF CARE:  Care Discussed with Consultants/Other Providers [Y/N]:  Prior or Outpatient Records Reviewed [Y/N]:   Karthik Garibay M.D  Resident  Department of Medicine  Available on [employer] Teams    ***********************************************************************  Patient is a 54y old  Male who presents with a chief complaint of Per chart, Pt is 55 y/o M w/ pmh T2DM, gastric bypass who presented with abd pain and nausea for the last 2 days. Admitted for likely starvation ketosis.       (16 Dec 2024 15:50)      SUBJECTIVE / OVERNIGHT EVENTS: Patient seen and examined at bedside. Not really eating. States he has abdominal pain with solids and liquids on intake. Minimal abd pain otherwise. Characterizes it as burning in nature in his upper abdomen, usually epigastric. Med rec  per pharmacists shows patient last filled insulin 06/24 but he states he filled it this month. No mention of a another medication at both his pharmacies. States he sees endocrinologist Dr. Hernandez at [phone] [facility] (on google this shows up as Dr. Camila Hernandez with [facility]).     ADDITIONAL REVIEW OF SYSTEMS:    MEDICATIONS  (STANDING):  atorvastatin 40 milliGRAM(s) Oral at bedtime  chlorhexidine 2% Cloths 1 Application(s) Topical daily  dextrose 5% + lactated ringers. 1000 milliLiter(s) (80 mL/Hr) IV Continuous <Continuous>  dextrose 5%. 1000 milliLiter(s) (50 mL/Hr) IV Continuous <Continuous>  dextrose 5%. 1000 milliLiter(s) (100 mL/Hr) IV Continuous <Continuous>  dextrose 50% Injectable 25 Gram(s) IV Push once  dextrose 50% Injectable 12.5 Gram(s) IV Push once  dextrose 50% Injectable 25 Gram(s) IV Push once  glucagon  Injectable 1 milliGRAM(s) IntraMuscular once  influenza   Vaccine 0.5 milliLiter(s) IntraMuscular once  insulin glargine Injectable (LANTUS) 12 Unit(s) SubCutaneous at bedtime  insulin lispro (ADMELOG) corrective regimen sliding scale   SubCutaneous three times a day before meals  insulin lispro (ADMELOG) corrective regimen sliding scale   SubCutaneous at bedtime  insulin lispro Injectable (ADMELOG) 5 Unit(s) SubCutaneous three times a day before meals  metoclopramide 5 milliGRAM(s) Oral every 8 hours  multivitamin 1 Tablet(s) Oral daily  mupirocin 2% Nasal 1 Application(s) Both Nostrils two times a day  mupirocin 2% Ointment 1 Application(s) Topical <User Schedule>  pantoprazole    Tablet 40 milliGRAM(s) Oral before breakfast    MEDICATIONS  (PRN):  acetaminophen     Tablet .. 650 milliGRAM(s) Oral every 6 hours PRN Mild Pain (1 - 3), Moderate Pain (4 - 6)  dextrose Oral Gel 15 Gram(s) Oral once PRN Blood Glucose LESS THAN 70 milliGRAM(s)/deciliter  melatonin 3 milliGRAM(s) Oral at bedtime PRN Insomnia      CAPILLARY BLOOD GLUCOSE      POCT Blood Glucose.: 217 mg/dL (16 Dec 2024 22:33)  POCT Blood Glucose.: 260 mg/dL (16 Dec 2024 18:05)  POCT Blood Glucose.: 230 mg/dL (16 Dec 2024 12:35)  POCT Blood Glucose.: 270 mg/dL (16 Dec 2024 08:41)    I&O's Summary    16 Dec 2024 07:01  -  17 Dec 2024 07:00  --------------------------------------------------------  IN: 1320 mL / OUT: 1900 mL / NET: -580 mL        PHYSICAL EXAM:    Vital Signs Last 24 Hrs  T(C): 37.1 (17 Dec 2024 06:30), Max: 37.1 (17 Dec 2024 06:30)  T(F): 98.8 (17 Dec 2024 06:30), Max: 98.8 (17 Dec 2024 06:30)  HR: 63 (17 Dec 2024 06:30) (63 - 74)  BP: 153/85 (17 Dec 2024 06:30) (135/71 - 158/71)  BP(mean): --  RR: 17 (17 Dec 2024 06:30) (17 - 18)  SpO2: 97% (17 Dec 2024 06:30) (97% - 99%)    Parameters below as of 17 Dec 2024 06:30  Patient On (Oxygen Delivery Method): room air        CONSTITUTIONAL: NAD, well-developed, well-groomed  EYES: Conjunctiva and sclera clear  ENMT: Moist oral mucosa, no pharyngeal injection or exudates; normal dentition  NECK: Supple, no palpable masses; no thyromegaly  RESPIRATORY: Normal respiratory effort; lungs are clear to auscultation bilaterally  CARDIOVASCULAR: Regular rate and rhythm, normal S1 and S2, no murmur/rub/gallop; No lower extremity edema; Peripheral pulses are 2+ bilaterally  ABDOMEN: Soft, nontender to palpation, normoactive bowel sounds, no rebound/guarding  MUSCULOSKELETAL: No clubbing or cyanosis of digits; no joint swelling or tenderness to palpation  PSYCH: A+O to person, place, and time; affect appropriate  NEUROLOGY: CN 2-12 are intact and symmetric; no gross sensory deficits   SKIN: No rashes; no palpable lesions    LABS:                        8.9    4.72  )-----------( 257      ( 17 Dec 2024 05:30 )             27.1     12-17    136  |  97[L]  |  8   ----------------------------<  210[H]  3.6   |  30  |  0.67    Ca    8.6      17 Dec 2024 05:30  Phos  2.4     12-17  Mg     1.70     12-17    TPro  5.2[L]  /  Alb  2.7[L]  /  TBili  0.2  /  DBili  x   /  AST  9   /  ALT  5   /  AlkPhos  66  12-17          Urinalysis Basic - ( 17 Dec 2024 05:30 )    Color: x / Appearance: x / SG: x / pH: x  Gluc: 210 mg/dL / Ketone: x  / Bili: x / Urobili: x   Blood: x / Protein: x / Nitrite: x   Leuk Esterase: x / RBC: x / WBC x   Sq Epi: x / Non Sq Epi: x / Bacteria: x          RADIOLOGY & ADDITIONAL TESTS: No new imaging  Results Reviewed: Yes  Imaging Personally Reviewed:  Electrocardiogram Personally Reviewed:    COORDINATION OF CARE:  Care Discussed with Consultants/Other Providers [Y/N]:  Prior or Outpatient Records Reviewed [Y/N]:

## 2024-12-18 LAB
GLUCOSE BLDC GLUCOMTR-MCNC: 107 MG/DL — HIGH (ref 70–99)
GLUCOSE BLDC GLUCOMTR-MCNC: 140 MG/DL — HIGH (ref 70–99)
GLUCOSE BLDC GLUCOMTR-MCNC: 151 MG/DL — HIGH (ref 70–99)
GLUCOSE BLDC GLUCOMTR-MCNC: 169 MG/DL — HIGH (ref 70–99)

## 2024-12-18 PROCEDURE — 99232 SBSQ HOSP IP/OBS MODERATE 35: CPT | Mod: GC

## 2024-12-18 PROCEDURE — 99232 SBSQ HOSP IP/OBS MODERATE 35: CPT

## 2024-12-18 RX ORDER — ISOPROPYL ALCOHOL, BENZOCAINE .7; .06 ML/ML; ML/ML
0 SWAB TOPICAL
Qty: 100 | Refills: 1
Start: 2024-12-18

## 2024-12-18 RX ORDER — FERROUS SULFATE 325(65) MG
325 TABLET ORAL DAILY
Refills: 0 | Status: DISCONTINUED | OUTPATIENT
Start: 2024-12-18 | End: 2024-12-18

## 2024-12-18 RX ORDER — FERROUS SULFATE 325(65) MG
325 TABLET ORAL
Refills: 0 | Status: DISCONTINUED | OUTPATIENT
Start: 2024-12-18 | End: 2024-12-19

## 2024-12-18 RX ORDER — SODIUM,POTASSIUM PHOSPHATES 278-250MG
1 POWDER IN PACKET (EA) ORAL ONCE
Refills: 0 | Status: COMPLETED | OUTPATIENT
Start: 2024-12-18 | End: 2024-12-18

## 2024-12-18 RX ADMIN — Medication 1 PACKET(S): at 14:19

## 2024-12-18 RX ADMIN — CHLORHEXIDINE GLUCONATE 1 APPLICATION(S): 1.2 RINSE ORAL at 13:11

## 2024-12-18 RX ADMIN — Medication 100 MILLIGRAM(S): at 13:07

## 2024-12-18 RX ADMIN — Medication 1 APPLICATION(S): at 17:52

## 2024-12-18 RX ADMIN — SUCRALFATE 1 GRAM(S): 1 TABLET ORAL at 07:17

## 2024-12-18 RX ADMIN — Medication 80 MILLILITER(S): at 07:18

## 2024-12-18 RX ADMIN — SUCRALFATE 1 GRAM(S): 1 TABLET ORAL at 23:56

## 2024-12-18 RX ADMIN — Medication 7 UNIT(S): at 09:15

## 2024-12-18 RX ADMIN — Medication 1 TABLET(S): at 13:06

## 2024-12-18 RX ADMIN — Medication 1 APPLICATION(S): at 07:19

## 2024-12-18 RX ADMIN — PANTOPRAZOLE SODIUM 40 MILLIGRAM(S): 40 TABLET, DELAYED RELEASE ORAL at 17:56

## 2024-12-18 RX ADMIN — Medication 7 UNIT(S): at 17:53

## 2024-12-18 RX ADMIN — Medication 1: at 17:52

## 2024-12-18 RX ADMIN — PANTOPRAZOLE SODIUM 40 MILLIGRAM(S): 40 TABLET, DELAYED RELEASE ORAL at 07:18

## 2024-12-18 RX ADMIN — Medication 40 MILLIGRAM(S): at 22:32

## 2024-12-18 RX ADMIN — SUCRALFATE 1 GRAM(S): 1 TABLET ORAL at 13:06

## 2024-12-18 RX ADMIN — Medication 7 UNIT(S): at 13:05

## 2024-12-18 RX ADMIN — SUCRALFATE 1 GRAM(S): 1 TABLET ORAL at 17:55

## 2024-12-18 RX ADMIN — Medication 1 APPLICATION(S): at 13:09

## 2024-12-18 RX ADMIN — Medication 1: at 09:15

## 2024-12-18 RX ADMIN — METOCLOPRAMIDE HYDROCHLORIDE 5 MILLIGRAM(S): 10 TABLET ORAL at 07:17

## 2024-12-18 RX ADMIN — INSULIN GLARGINE 15 UNIT(S): 100 INJECTION, SOLUTION SUBCUTANEOUS at 22:32

## 2024-12-18 RX ADMIN — Medication 80 MILLILITER(S): at 00:27

## 2024-12-18 RX ADMIN — Medication 325 MILLIGRAM(S): at 19:16

## 2024-12-18 NOTE — PROGRESS NOTE ADULT - SUBJECTIVE AND OBJECTIVE BOX
Chief Complaint: DM type 2     Interval Events: FS at goal. Patient did not eat a lot for breakfast today. No N/V or abdominal pain.     MEDICATIONS  (STANDING):  atorvastatin 40 milliGRAM(s) Oral at bedtime  chlorhexidine 2% Cloths 1 Application(s) Topical daily  dextrose 5%. 1000 milliLiter(s) (50 mL/Hr) IV Continuous <Continuous>  dextrose 5%. 1000 milliLiter(s) (100 mL/Hr) IV Continuous <Continuous>  dextrose 50% Injectable 25 Gram(s) IV Push once  dextrose 50% Injectable 12.5 Gram(s) IV Push once  dextrose 50% Injectable 25 Gram(s) IV Push once  glucagon  Injectable 1 milliGRAM(s) IntraMuscular once  influenza   Vaccine 0.5 milliLiter(s) IntraMuscular once  insulin glargine Injectable (LANTUS) 15 Unit(s) SubCutaneous at bedtime  insulin lispro (ADMELOG) corrective regimen sliding scale   SubCutaneous three times a day before meals  insulin lispro (ADMELOG) corrective regimen sliding scale   SubCutaneous at bedtime  insulin lispro Injectable (ADMELOG) 7 Unit(s) SubCutaneous three times a day before meals  multivitamin 1 Tablet(s) Oral daily  mupirocin 2% Nasal 1 Application(s) Both Nostrils two times a day  mupirocin 2% Ointment 1 Application(s) Topical <User Schedule>  pantoprazole   Suspension 40 milliGRAM(s) Oral two times a day before meals  potassium phosphate / sodium phosphate Powder (PHOS-NaK) 1 Packet(s) Oral once  sucralfate 1 Gram(s) Oral four times a day  thiamine 100 milliGRAM(s) Oral daily    MEDICATIONS  (PRN):  acetaminophen     Tablet .. 650 milliGRAM(s) Oral every 6 hours PRN Mild Pain (1 - 3), Moderate Pain (4 - 6)  dextrose Oral Gel 15 Gram(s) Oral once PRN Blood Glucose LESS THAN 70 milliGRAM(s)/deciliter  melatonin 3 milliGRAM(s) Oral at bedtime PRN Insomnia    Allergies  No Known Allergies    Review of Systems:  Constitutional: No fever/chills   Eyes: No blurry vision  Cardiovascular: No chest pain, no palpitations  Respiratory: No SOB, no cough  GI: No nausea, vomiting or abdominal pain  : No dysuria  Endocrine: no polyuria, polydipsia    VITALS: T(C): 36.8 (12-18-24 @ 12:32)  T(F): 98.2 (12-18-24 @ 12:32), Max: 99.8 (12-17-24 @ 14:41)  HR: 72 (12-18-24 @ 12:32) (61 - 72)  BP: 150/66 (12-18-24 @ 12:32) (141/76 - 155/74)  RR:  (18 - 18)  SpO2:  (95% - 98%)  Wt(kg): --    Physical Exam:   GENERAL: NAD, well-groomed, well-developed  EYES: No proptosis, no lid lag, anicteric  HEENT:  Atraumatic, Normocephalic, moist mucous membranes  RESPIRATORY: non labored breathing   GI: Soft, nontender, non distended  NEURO: A&Ox3    CAPILLARY BLOOD GLUCOSE  POCT Blood Glucose.: 107 mg/dL (18 Dec 2024 12:22)  POCT Blood Glucose.: 169 mg/dL (18 Dec 2024 08:56)  POCT Blood Glucose.: 172 mg/dL (17 Dec 2024 22:19)  POCT Blood Glucose.: 194 mg/dL (17 Dec 2024 18:08)  POCT Blood Glucose.: 227 mg/dL (17 Dec 2024 15:10)      12-17    136  |  97[L]  |  8   ----------------------------<  210[H]  3.6   |  30  |  0.67    eGFR: 112    Ca    8.6      12-17  Mg     1.70     12-17  Phos  2.4     12-17    TPro  5.2[L]  /  Alb  2.7[L]  /  TBili  0.2  /  DBili  x   /  AST  9   /  ALT  5   /  AlkPhos  66  12-17      A1C with Estimated Average Glucose Result: 12.5 % (12-14-24 @ 00:48)

## 2024-12-18 NOTE — PROGRESS NOTE ADULT - PROBLEM SELECTOR PLAN 7
- Fluids: None  - Electrolytes: Will replete to maintain K>4, Phos>3, and Mag>2  - Nutrition: CC diet  - Activity: As tolerated  - DVT Prophylaxis: None  - Stress Ulcer/GI Prophylaxis: PPI  - Disposition: Abd pain workup - Fluids: None  - Electrolytes: Will replete to maintain K>4, Phos>3, and Mag>2  - Nutrition: CC diet  - Activity: As tolerated  - DVT Prophylaxis: None  - Stress Ulcer/GI Prophylaxis: PPI, sucralfate   - Disposition: Abd pain workup

## 2024-12-18 NOTE — PROGRESS NOTE ADULT - ASSESSMENT
55 y/o M w/ pmh T2DM, gastric bypass who presented with abd pain and nausea for the last 2 days. Admitted for likely starvation ketosis.   55 y/o M w/ pmh T2DM, gastric bypass who presented with abd pain and nausea for the last 2 days. Admitted for starvation ketosis now working up his abdominal pain

## 2024-12-18 NOTE — PROGRESS NOTE ADULT - PROBLEM SELECTOR PLAN 3
- B/l LE ulcerations chronic. L>R  - never evaluated by podiatry. These ulcers likely in setting of DM, but no sensory loss in LE  - ESR: 34, CRP 22  - Blood Cx NGTD  - INDY and PVR wnl  - Low concern for osteo or cellulitis given labs and exam. Low concern for cellulitis  Plan:  - management per wound care and podiatry recs  - Hold off on antibiotics for now. Will observe

## 2024-12-18 NOTE — PROGRESS NOTE ADULT - PROBLEM SELECTOR PLAN 4
Iron 26, TIBC 142, Transferrin decreased, Ferritin 161    Likely iron deficiency anemia     - Plan for outpatient iron supplementation Iron 26, TIBC 142, Transferrin decreased, Ferritin 161    Likely iron deficiency anemia     - start iron supplementation

## 2024-12-18 NOTE — PROGRESS NOTE ADULT - ASSESSMENT
54M PMHx DM (only on Lantus 10 daily but recently non-adherent), hx of gastric bypass 6mo ago in [place], recent Covid diagnosis, p/w 6 days of abd pain/nausea, found to have poorly controlled DM (A1c 12.5%), with hyperglycemia, elevated AG, BHB 6.5, glucosuria and ketonuria as well as non-healing ulcers to bilateral lower extremities.   Endocrine called for DM management.       DM type 2   He is currently on Lantus 10 units at home, as well as glimepiride. ? (needs med [facility])  A1c 12.5      Inpatient plan   - FS goal 100-180 mg/dl   - FS at goal   - Continue Lantus 15 units at bedtime. DO NOT HOLD IF NPO.   - Continue Admelog 7 units TID AC. Hold if not eating/NPO.   - continue low Admelog correctional scale TID AC  - continue separate low Admelog correctional scale at HS  - FS TID AC & HS ---> q6 if NPO   - hypoglycemia protocol PRN   - consistent carbohydrate diet  - RD consult  - C-peptide 0.7 with Serum Glucose of 250 - unable to produce endogenous insulin  - follow up anti ISLET, anti SHEKHAR, zn transporter 8, IA -2 antibody  ---> specimen received      Discharge plan   - Discharge on: Lantus +/- bolus insulin depending on inpatient requirements  - Stop glimepiride  - please ensure patient has a working glucometer and supplies - test strip, lancets, alcohol pads and insulin pen needles.   - Please send prescription for glucose tablets 4G (take 4 tablets) or 15G tablets for blood sugar less than 70 mG/dL, repeat fingerstick in 15 minutes. Call your provider for dose adjustment if needed.   - Follow up at [facility]: [phone] [place] [address]; Ph # [phone]   (office made aware to call the patient for appointment on 12/18)      HTN  - goal BP in DM <130/80  - outpt mc/cr ratio  - management per primary team       HLD  - LDL goal <70  -    - continue statin   - management per primary team         MARCI FordeP-BC  Nurse Practitioner  Division of Endocrinology & Diabetes  available via  Teams

## 2024-12-18 NOTE — PROGRESS NOTE ADULT - SUBJECTIVE AND OBJECTIVE BOX
Karthik Garibay M.D  Resident  Department of Medicine  Available on [employer] Teams    ***********************************************************************  Patient is a 54y old  Male who presents with a chief complaint of Abdominal pain (17 Dec 2024 13:08)      SUBJECTIVE / OVERNIGHT EVENTS: Patient seen and examined at bedside.     ADDITIONAL REVIEW OF SYSTEMS:    MEDICATIONS  (STANDING):  atorvastatin 40 milliGRAM(s) Oral at bedtime  chlorhexidine 2% Cloths 1 Application(s) Topical daily  dextrose 5% + lactated ringers. 1000 milliLiter(s) (80 mL/Hr) IV Continuous <Continuous>  dextrose 5%. 1000 milliLiter(s) (50 mL/Hr) IV Continuous <Continuous>  dextrose 5%. 1000 milliLiter(s) (100 mL/Hr) IV Continuous <Continuous>  dextrose 50% Injectable 25 Gram(s) IV Push once  dextrose 50% Injectable 12.5 Gram(s) IV Push once  dextrose 50% Injectable 25 Gram(s) IV Push once  glucagon  Injectable 1 milliGRAM(s) IntraMuscular once  influenza   Vaccine 0.5 milliLiter(s) IntraMuscular once  insulin glargine Injectable (LANTUS) 15 Unit(s) SubCutaneous at bedtime  insulin lispro (ADMELOG) corrective regimen sliding scale   SubCutaneous three times a day before meals  insulin lispro (ADMELOG) corrective regimen sliding scale   SubCutaneous at bedtime  insulin lispro Injectable (ADMELOG) 7 Unit(s) SubCutaneous three times a day before meals  multivitamin 1 Tablet(s) Oral daily  mupirocin 2% Nasal 1 Application(s) Both Nostrils two times a day  mupirocin 2% Ointment 1 Application(s) Topical <User Schedule>  pantoprazole   Suspension 40 milliGRAM(s) Oral two times a day before meals  sucralfate 1 Gram(s) Oral four times a day  thiamine 100 milliGRAM(s) Oral daily    MEDICATIONS  (PRN):  acetaminophen     Tablet .. 650 milliGRAM(s) Oral every 6 hours PRN Mild Pain (1 - 3), Moderate Pain (4 - 6)  dextrose Oral Gel 15 Gram(s) Oral once PRN Blood Glucose LESS THAN 70 milliGRAM(s)/deciliter  melatonin 3 milliGRAM(s) Oral at bedtime PRN Insomnia      CAPILLARY BLOOD GLUCOSE      POCT Blood Glucose.: 172 mg/dL (17 Dec 2024 22:19)  POCT Blood Glucose.: 194 mg/dL (17 Dec 2024 18:08)  POCT Blood Glucose.: 227 mg/dL (17 Dec 2024 15:10)  POCT Blood Glucose.: 168 mg/dL (17 Dec 2024 12:20)  POCT Blood Glucose.: 263 mg/dL (17 Dec 2024 08:53)    I&O's Summary    17 Dec 2024 07:01  -  18 Dec 2024 07:00  --------------------------------------------------------  IN: 0 mL / OUT: 650 mL / NET: -650 mL        PHYSICAL EXAM:    Vital Signs Last 24 Hrs  T(C): 36.6 (18 Dec 2024 06:07), Max: 37.7 (17 Dec 2024 14:41)  T(F): 97.8 (18 Dec 2024 06:07), Max: 99.8 (17 Dec 2024 14:41)  HR: 61 (18 Dec 2024 06:07) (61 - 67)  BP: 141/76 (18 Dec 2024 06:07) (141/76 - 155/74)  BP(mean): --  RR: 18 (18 Dec 2024 06:07) (18 - 18)  SpO2: 95% (18 Dec 2024 06:07) (95% - 98%)    Parameters below as of 18 Dec 2024 06:07  Patient On (Oxygen Delivery Method): room air        CONSTITUTIONAL: NAD, well-developed, well-groomed  EYES: Conjunctiva and sclera clear  ENMT: Moist oral mucosa, no pharyngeal injection or exudates; normal dentition  NECK: Supple, no palpable masses; no thyromegaly  RESPIRATORY: Normal respiratory effort; lungs are clear to auscultation bilaterally  CARDIOVASCULAR: Regular rate and rhythm, normal S1 and S2, no murmur/rub/gallop; No lower extremity edema; Peripheral pulses are 2+ bilaterally  ABDOMEN: Soft, nontender to palpation, normoactive bowel sounds, no rebound/guarding  MUSCULOSKELETAL: No clubbing or cyanosis of digits; no joint swelling or tenderness to palpation  PSYCH: A+O to person, place, and time; affect appropriate  NEUROLOGY: CN 2-12 are intact and symmetric; no gross sensory deficits   SKIN: No rashes; no palpable lesions    LABS:                        8.9    4.72  )-----------( 257      ( 17 Dec 2024 05:30 )             27.1     12-17    136  |  97[L]  |  8   ----------------------------<  210[H]  3.6   |  30  |  0.67    Ca    8.6      17 Dec 2024 05:30  Phos  2.4     12-17  Mg     1.70     12-17    TPro  5.2[L]  /  Alb  2.7[L]  /  TBili  0.2  /  DBili  x   /  AST  9   /  ALT  5   /  AlkPhos  66  12-17          Urinalysis Basic - ( 17 Dec 2024 05:30 )    Color: x / Appearance: x / SG: x / pH: x  Gluc: 210 mg/dL / Ketone: x  / Bili: x / Urobili: x   Blood: x / Protein: x / Nitrite: x   Leuk Esterase: x / RBC: x / WBC x   Sq Epi: x / Non Sq Epi: x / Bacteria: x          RADIOLOGY & ADDITIONAL TESTS: No new imaging  Results Reviewed: Yes  Imaging Personally Reviewed:  Electrocardiogram Personally Reviewed:    COORDINATION OF CARE:  Care Discussed with Consultants/Other Providers [Y/N]:  Prior or Outpatient Records Reviewed [Y/N]:   Karhtik Garibay M.D  Resident  Department of Medicine  Available on [employer] Teams    ***********************************************************************  Patient is a 54y old  Male who presents with a chief complaint of Abdominal pain (17 Dec 2024 13:08)      SUBJECTIVE / OVERNIGHT EVENTS: Patient seen and examined at bedside. Eating mildly now, saw him eat bread this am. States he has pain with drinking water. Was amenable to the EGD this am    ADDITIONAL REVIEW OF SYSTEMS:    MEDICATIONS  (STANDING):  atorvastatin 40 milliGRAM(s) Oral at bedtime  chlorhexidine 2% Cloths 1 Application(s) Topical daily  dextrose 5% + lactated ringers. 1000 milliLiter(s) (80 mL/Hr) IV Continuous <Continuous>  dextrose 5%. 1000 milliLiter(s) (50 mL/Hr) IV Continuous <Continuous>  dextrose 5%. 1000 milliLiter(s) (100 mL/Hr) IV Continuous <Continuous>  dextrose 50% Injectable 25 Gram(s) IV Push once  dextrose 50% Injectable 12.5 Gram(s) IV Push once  dextrose 50% Injectable 25 Gram(s) IV Push once  glucagon  Injectable 1 milliGRAM(s) IntraMuscular once  influenza   Vaccine 0.5 milliLiter(s) IntraMuscular once  insulin glargine Injectable (LANTUS) 15 Unit(s) SubCutaneous at bedtime  insulin lispro (ADMELOG) corrective regimen sliding scale   SubCutaneous three times a day before meals  insulin lispro (ADMELOG) corrective regimen sliding scale   SubCutaneous at bedtime  insulin lispro Injectable (ADMELOG) 7 Unit(s) SubCutaneous three times a day before meals  multivitamin 1 Tablet(s) Oral daily  mupirocin 2% Nasal 1 Application(s) Both Nostrils two times a day  mupirocin 2% Ointment 1 Application(s) Topical <User Schedule>  pantoprazole   Suspension 40 milliGRAM(s) Oral two times a day before meals  sucralfate 1 Gram(s) Oral four times a day  thiamine 100 milliGRAM(s) Oral daily    MEDICATIONS  (PRN):  acetaminophen     Tablet .. 650 milliGRAM(s) Oral every 6 hours PRN Mild Pain (1 - 3), Moderate Pain (4 - 6)  dextrose Oral Gel 15 Gram(s) Oral once PRN Blood Glucose LESS THAN 70 milliGRAM(s)/deciliter  melatonin 3 milliGRAM(s) Oral at bedtime PRN Insomnia      CAPILLARY BLOOD GLUCOSE      POCT Blood Glucose.: 172 mg/dL (17 Dec 2024 22:19)  POCT Blood Glucose.: 194 mg/dL (17 Dec 2024 18:08)  POCT Blood Glucose.: 227 mg/dL (17 Dec 2024 15:10)  POCT Blood Glucose.: 168 mg/dL (17 Dec 2024 12:20)  POCT Blood Glucose.: 263 mg/dL (17 Dec 2024 08:53)    I&O's Summary    17 Dec 2024 07:01  -  18 Dec 2024 07:00  --------------------------------------------------------  IN: 0 mL / OUT: 650 mL / NET: -650 mL        PHYSICAL EXAM:    Vital Signs Last 24 Hrs  T(C): 36.6 (18 Dec 2024 06:07), Max: 37.7 (17 Dec 2024 14:41)  T(F): 97.8 (18 Dec 2024 06:07), Max: 99.8 (17 Dec 2024 14:41)  HR: 61 (18 Dec 2024 06:07) (61 - 67)  BP: 141/76 (18 Dec 2024 06:07) (141/76 - 155/74)  BP(mean): --  RR: 18 (18 Dec 2024 06:07) (18 - 18)  SpO2: 95% (18 Dec 2024 06:07) (95% - 98%)    Parameters below as of 18 Dec 2024 06:07  Patient On (Oxygen Delivery Method): room air        CONSTITUTIONAL: NAD, well-developed, well-groomed  RESPIRATORY: Normal respiratory effort; lungs are clear to auscultation bilaterally  CARDIOVASCULAR: Regular rate and rhythm, normal S1 and S2, no murmur/rub/gallop; No lower extremity edema; Peripheral pulses are 2+ bilaterally  ABDOMEN: Soft, nontender to palpation, normoactive bowel sounds, no rebound/guarding  MUSCULOSKELETAL: No clubbing or cyanosis of digits; no joint swelling or tenderness to palpation  PSYCH: A+O to person, place, and time; affect appropriate  NEUROLOGY:  no gross sensory deficits   SKIN: No rashes; BLE ulcers. Wrapped.     LABS:                        8.9    4.72  )-----------( 257      ( 17 Dec 2024 05:30 )             27.1     12-17    136  |  97[L]  |  8   ----------------------------<  210[H]  3.6   |  30  |  0.67    Ca    8.6      17 Dec 2024 05:30  Phos  2.4     12-17  Mg     1.70     12-17    TPro  5.2[L]  /  Alb  2.7[L]  /  TBili  0.2  /  DBili  x   /  AST  9   /  ALT  5   /  AlkPhos  66  12-17          Urinalysis Basic - ( 17 Dec 2024 05:30 )    Color: x / Appearance: x / SG: x / pH: x  Gluc: 210 mg/dL / Ketone: x  / Bili: x / Urobili: x   Blood: x / Protein: x / Nitrite: x   Leuk Esterase: x / RBC: x / WBC x   Sq Epi: x / Non Sq Epi: x / Bacteria: x          RADIOLOGY & ADDITIONAL TESTS: No new imaging  Results Reviewed: Yes  Imaging Personally Reviewed:  Electrocardiogram Personally Reviewed:    COORDINATION OF CARE:  Care Discussed with Consultants/Other Providers [Y/N]:  Prior or Outpatient Records Reviewed [Y/N]:

## 2024-12-18 NOTE — PROGRESS NOTE ADULT - PROBLEM SELECTOR PLAN 2
- s/p 1L NS and 10u Lantus in ED  - On admission BS not elevated but HCO3 22, BHB 6.5, A1c 12.5%, pH 7.41, lactate 1.2. UA positive for ketones and glucose  - C- peptide low at 0.7  - Possibly following with Dr. Camila Hernandez for endocrinology.  - Full resolution limited by patient's limited oral intake 2/2 abd pain as above    Plan:  - Endocrinology following. Awaiting recs  - Continue D5 in setting of decreased PO intake  - Med rec to call endo office to confirm outpatient meds  - Lantus 15u qhs   - admelog 7u premeal per endo with insulin scales  - f/u on anti ISLET, anti SHEKHAR, zn transporter 8, IA -2 antibody   - CC diet, PPI  - Start Atorvastatin 40mg qD (ASCVD score elevated) - s/p 1L NS and 10u Lantus in ED  - On admission BS not elevated but HCO3 22, BHB 6.5, A1c 12.5%, pH 7.41, lactate 1.2. UA positive for ketones and glucose  - C- peptide low at 0.7  - Possibly following with Dr. Camila Hernandez for endocrinology.  - Full resolution limited by patient's limited oral intake 2/2 abd pain as above    Plan:  - Endocrinology following. Awaiting recs  - Stop D5   - Med rec to call endo office to confirm outpatient meds  - Lantus 15u qhs   - admelog 7u premeal per endo with insulin scales  - f/u on anti ISLET, anti SHEKHAR, zn transporter 8, IA -2 antibody   - CC diet, PPI  - Start Atorvastatin 40mg qD (ASCVD score elevated)

## 2024-12-18 NOTE — PROGRESS NOTE ADULT - TIME BILLING
The necessity of the time spent during the encounter on this date of service was due to:     Time spent reviewing the chart documentation, reviewing labs and imaging studies, evaluating the patient, discussing the plan of care with the consultants & medical team, and documenting.
Time-based billing (NON-critical care).     [ 35 ] minutes spent on total encounter; more than 50% of the visit was spent counseling and / or coordinating care by the attending physician.  The necessity of the time spent during the encounter on this date of service was due to:     documentation in [place], reviewing chart and coordinating care with patient/ACP and interdisciplinary staff (such as , social workers, etc) as well as reviewing vitals, laboratory data, radiology, medication list, consultants' recommendations and prior records. Interventions were performed as documented above.
Time-based billing (NON-critical care).     [ 35 ] minutes spent on total encounter; more than 50% of the visit was spent counseling and / or coordinating care by the attending physician.  The necessity of the time spent during the encounter on this date of service was due to:     documentation in [place], reviewing chart and coordinating care with patient/ACP and interdisciplinary staff (such as , social workers, etc) as well as reviewing vitals, laboratory data, radiology, medication list, consultants' recommendations and prior records. Interventions were performed as documented above.

## 2024-12-18 NOTE — PROGRESS NOTE ADULT - PROBLEM SELECTOR PLAN 1
gastroparesis vs small bowel overgrowth vs unlikely structural complication of gastric bypass  - Hepatobiliary labs wnl  - Burning pain, possibly gastric ulcers at gastric bypass incision sites  - CTAP 12/14: only showed diverticulosis and post surgical changes of stomach  - US RUQ 12/17: Distended gallbladder. No cholelithiasis. Nopericholecystic fluid. No biliary ductal dilatation. Mild left hydronephrosis.    Plan:  - GI consulted. Appreciate recs  - Reglan 5 q8h for 2 days  - Sucralfate   - Continue PPI gastroparesis vs small bowel overgrowth vs unlikely structural complication of gastric bypass  - Hepatobiliary labs wnl  - Burning pain, possibly gastric ulcers at gastric bypass incision sites  - CTAP 12/14: only showed diverticulosis and post surgical changes of stomach  - US RUQ 12/17: Distended gallbladder. No cholelithiasis. Nopericholecystic fluid. No biliary ductal dilatation. Mild left hydronephrosis.    Plan:  - GI consulted. Appreciate recs  - Plan for endoscopy per GI  - Reglan 5 q8h for 2 days (end today)  - Sucralfate   - Continue PPI solution

## 2024-12-19 ENCOUNTER — TRANSCRIPTION ENCOUNTER (OUTPATIENT)
Age: 54
End: 2024-12-19

## 2024-12-19 VITALS
TEMPERATURE: 99 F | SYSTOLIC BLOOD PRESSURE: 118 MMHG | DIASTOLIC BLOOD PRESSURE: 70 MMHG | HEART RATE: 69 BPM | OXYGEN SATURATION: 99 % | RESPIRATION RATE: 18 BRPM

## 2024-12-19 LAB
ALBUMIN SERPL ELPH-MCNC: 2.7 G/DL — LOW (ref 3.3–5)
ALP SERPL-CCNC: 63 U/L — SIGNIFICANT CHANGE UP (ref 40–120)
ALT FLD-CCNC: <5 U/L — SIGNIFICANT CHANGE UP (ref 4–41)
ANION GAP SERPL CALC-SCNC: 8 MMOL/L — SIGNIFICANT CHANGE UP (ref 7–14)
AST SERPL-CCNC: 13 U/L — SIGNIFICANT CHANGE UP (ref 4–40)
B-OH-BUTYR SERPL-SCNC: <0 MMOL/L — SIGNIFICANT CHANGE UP (ref 0–0.4)
BASOPHILS # BLD AUTO: 0.04 K/UL — SIGNIFICANT CHANGE UP (ref 0–0.2)
BASOPHILS NFR BLD AUTO: 0.8 % — SIGNIFICANT CHANGE UP (ref 0–2)
BILIRUB SERPL-MCNC: <0.2 MG/DL — SIGNIFICANT CHANGE UP (ref 0.2–1.2)
BLOOD GAS VENOUS COMPREHENSIVE RESULT: SIGNIFICANT CHANGE UP
BUN SERPL-MCNC: 11 MG/DL — SIGNIFICANT CHANGE UP (ref 7–23)
CALCIUM SERPL-MCNC: 8.2 MG/DL — LOW (ref 8.4–10.5)
CHLORIDE SERPL-SCNC: 101 MMOL/L — SIGNIFICANT CHANGE UP (ref 98–107)
CO2 SERPL-SCNC: 28 MMOL/L — SIGNIFICANT CHANGE UP (ref 22–31)
CREAT SERPL-MCNC: 0.72 MG/DL — SIGNIFICANT CHANGE UP (ref 0.5–1.3)
CULTURE RESULTS: SIGNIFICANT CHANGE UP
CULTURE RESULTS: SIGNIFICANT CHANGE UP
EGFR: 110 ML/MIN/1.73M2 — SIGNIFICANT CHANGE UP
EOSINOPHIL # BLD AUTO: 0.12 K/UL — SIGNIFICANT CHANGE UP (ref 0–0.5)
EOSINOPHIL NFR BLD AUTO: 2.4 % — SIGNIFICANT CHANGE UP (ref 0–6)
GLUCOSE BLDC GLUCOMTR-MCNC: 130 MG/DL — HIGH (ref 70–99)
GLUCOSE BLDC GLUCOMTR-MCNC: 90 MG/DL — SIGNIFICANT CHANGE UP (ref 70–99)
GLUCOSE SERPL-MCNC: 99 MG/DL — SIGNIFICANT CHANGE UP (ref 70–99)
HCT VFR BLD CALC: 27 % — LOW (ref 39–50)
HGB BLD-MCNC: 8.8 G/DL — LOW (ref 13–17)
IANC: 2.65 K/UL — SIGNIFICANT CHANGE UP (ref 1.8–7.4)
IMM GRANULOCYTES NFR BLD AUTO: 0.2 % — SIGNIFICANT CHANGE UP (ref 0–0.9)
ISLET CELL512 AB SER-ACNC: SIGNIFICANT CHANGE UP
LYMPHOCYTES # BLD AUTO: 1.53 K/UL — SIGNIFICANT CHANGE UP (ref 1–3.3)
LYMPHOCYTES # BLD AUTO: 31.1 % — SIGNIFICANT CHANGE UP (ref 13–44)
MAGNESIUM SERPL-MCNC: 1.9 MG/DL — SIGNIFICANT CHANGE UP (ref 1.6–2.6)
MCHC RBC-ENTMCNC: 28.3 PG — SIGNIFICANT CHANGE UP (ref 27–34)
MCHC RBC-ENTMCNC: 32.6 G/DL — SIGNIFICANT CHANGE UP (ref 32–36)
MCV RBC AUTO: 86.8 FL — SIGNIFICANT CHANGE UP (ref 80–100)
MONOCYTES # BLD AUTO: 0.57 K/UL — SIGNIFICANT CHANGE UP (ref 0–0.9)
MONOCYTES NFR BLD AUTO: 11.6 % — SIGNIFICANT CHANGE UP (ref 2–14)
NEUTROPHILS # BLD AUTO: 2.65 K/UL — SIGNIFICANT CHANGE UP (ref 1.8–7.4)
NEUTROPHILS NFR BLD AUTO: 53.9 % — SIGNIFICANT CHANGE UP (ref 43–77)
NRBC # BLD: 0 /100 WBCS — SIGNIFICANT CHANGE UP (ref 0–0)
NRBC # FLD: 0 K/UL — SIGNIFICANT CHANGE UP (ref 0–0)
PHOSPHATE SERPL-MCNC: 2.7 MG/DL — SIGNIFICANT CHANGE UP (ref 2.5–4.5)
PLATELET # BLD AUTO: 252 K/UL — SIGNIFICANT CHANGE UP (ref 150–400)
POTASSIUM SERPL-MCNC: 3.4 MMOL/L — LOW (ref 3.5–5.3)
POTASSIUM SERPL-SCNC: 3.4 MMOL/L — LOW (ref 3.5–5.3)
PROT SERPL-MCNC: 5.1 G/DL — LOW (ref 6–8.3)
RBC # BLD: 3.11 M/UL — LOW (ref 4.2–5.8)
RBC # FLD: 12.1 % — SIGNIFICANT CHANGE UP (ref 10.3–14.5)
SODIUM SERPL-SCNC: 137 MMOL/L — SIGNIFICANT CHANGE UP (ref 135–145)
SPECIMEN SOURCE: SIGNIFICANT CHANGE UP
SPECIMEN SOURCE: SIGNIFICANT CHANGE UP
WBC # BLD: 4.92 K/UL — SIGNIFICANT CHANGE UP (ref 3.8–10.5)
WBC # FLD AUTO: 4.92 K/UL — SIGNIFICANT CHANGE UP (ref 3.8–10.5)

## 2024-12-19 PROCEDURE — 99239 HOSP IP/OBS DSCHRG MGMT >30: CPT | Mod: GC

## 2024-12-19 PROCEDURE — 99232 SBSQ HOSP IP/OBS MODERATE 35: CPT

## 2024-12-19 RX ORDER — GLIPIZIDE 5 MG/1
1 TABLET, FILM COATED, EXTENDED RELEASE ORAL
Refills: 0 | DISCHARGE

## 2024-12-19 RX ORDER — CYANOCOBALAMIN/FOLIC AC/VIT B6 1-2.2-25MG
1 TABLET ORAL
Qty: 30 | Refills: 0
Start: 2024-12-19 | End: 2025-01-17

## 2024-12-19 RX ORDER — INSULIN GLARGINE 100 [IU]/ML
12 INJECTION, SOLUTION SUBCUTANEOUS
Qty: 1 | Refills: 0
Start: 2024-12-19 | End: 2025-01-17

## 2024-12-19 RX ORDER — PANTOPRAZOLE SODIUM 40 MG/1
1 TABLET, DELAYED RELEASE ORAL
Qty: 60 | Refills: 0
Start: 2024-12-19 | End: 2025-01-17

## 2024-12-19 RX ORDER — SUCRALFATE 1 G/1
1 TABLET ORAL
Qty: 120 | Refills: 0
Start: 2024-12-19 | End: 2025-01-17

## 2024-12-19 RX ORDER — FERROUS SULFATE 325(65) MG
1 TABLET ORAL
Qty: 13 | Refills: 0
Start: 2024-12-19 | End: 2025-01-17

## 2024-12-19 RX ORDER — INSULIN GLARGINE 100 [IU]/ML
12 INJECTION, SOLUTION SUBCUTANEOUS AT BEDTIME
Refills: 0 | Status: DISCONTINUED | OUTPATIENT
Start: 2024-12-19 | End: 2024-12-19

## 2024-12-19 RX ORDER — POTASSIUM CHLORIDE 600 MG/1
20 TABLET, EXTENDED RELEASE ORAL
Refills: 0 | Status: COMPLETED | OUTPATIENT
Start: 2024-12-19 | End: 2024-12-19

## 2024-12-19 RX ORDER — INSULIN DEGLUDEC 100 U/ML
20 INJECTION, SOLUTION SUBCUTANEOUS
Refills: 0 | DISCHARGE

## 2024-12-19 RX ADMIN — Medication 1 APPLICATION(S): at 12:45

## 2024-12-19 RX ADMIN — Medication 7 UNIT(S): at 09:06

## 2024-12-19 RX ADMIN — POTASSIUM CHLORIDE 20 MILLIEQUIVALENT(S): 600 TABLET, EXTENDED RELEASE ORAL at 10:38

## 2024-12-19 RX ADMIN — PANTOPRAZOLE SODIUM 40 MILLIGRAM(S): 40 TABLET, DELAYED RELEASE ORAL at 07:02

## 2024-12-19 RX ADMIN — SUCRALFATE 1 GRAM(S): 1 TABLET ORAL at 12:48

## 2024-12-19 RX ADMIN — SUCRALFATE 1 GRAM(S): 1 TABLET ORAL at 07:01

## 2024-12-19 RX ADMIN — Medication 7 UNIT(S): at 12:48

## 2024-12-19 RX ADMIN — POTASSIUM CHLORIDE 20 MILLIEQUIVALENT(S): 600 TABLET, EXTENDED RELEASE ORAL at 09:06

## 2024-12-19 RX ADMIN — Medication 100 MILLIGRAM(S): at 12:48

## 2024-12-19 RX ADMIN — POTASSIUM CHLORIDE 20 MILLIEQUIVALENT(S): 600 TABLET, EXTENDED RELEASE ORAL at 12:48

## 2024-12-19 RX ADMIN — Medication 1 APPLICATION(S): at 07:03

## 2024-12-19 RX ADMIN — CHLORHEXIDINE GLUCONATE 1 APPLICATION(S): 1.2 RINSE ORAL at 12:53

## 2024-12-19 RX ADMIN — PANTOPRAZOLE SODIUM 40 MILLIGRAM(S): 40 TABLET, DELAYED RELEASE ORAL at 17:10

## 2024-12-19 RX ADMIN — Medication 1 APPLICATION(S): at 12:49

## 2024-12-19 RX ADMIN — Medication 1 TABLET(S): at 12:49

## 2024-12-19 NOTE — PROGRESS NOTE ADULT - SUBJECTIVE AND OBJECTIVE BOX
Chief Complaint: T2DM vs FRANCA    Interval Events: Pt seen and examined at bedside. Pt tolerating carb consistent diet with no nausea, no vomiting. Discussed pt going home with recommendations for both basal and bolus insulin before meals and pt in agreement. He states he feels comfortable using insulin pens and declines review at this time.     MEDICATIONS  (STANDING):  atorvastatin 40 milliGRAM(s) Oral at bedtime  chlorhexidine 2% Cloths 1 Application(s) Topical daily  dextrose 5%. 1000 milliLiter(s) (50 mL/Hr) IV Continuous <Continuous>  dextrose 5%. 1000 milliLiter(s) (100 mL/Hr) IV Continuous <Continuous>  dextrose 50% Injectable 25 Gram(s) IV Push once  dextrose 50% Injectable 12.5 Gram(s) IV Push once  dextrose 50% Injectable 25 Gram(s) IV Push once  ferrous    sulfate 325 milliGRAM(s) Oral <User Schedule>  glucagon  Injectable 1 milliGRAM(s) IntraMuscular once  influenza   Vaccine 0.5 milliLiter(s) IntraMuscular once  insulin glargine Injectable (LANTUS) 12 Unit(s) SubCutaneous at bedtime  insulin lispro (ADMELOG) corrective regimen sliding scale   SubCutaneous three times a day before meals  insulin lispro (ADMELOG) corrective regimen sliding scale   SubCutaneous at bedtime  insulin lispro Injectable (ADMELOG) 7 Unit(s) SubCutaneous three times a day before meals  multivitamin 1 Tablet(s) Oral daily  mupirocin 2% Nasal 1 Application(s) Both Nostrils two times a day  mupirocin 2% Ointment 1 Application(s) Topical <User Schedule>  pantoprazole   Suspension 40 milliGRAM(s) Oral two times a day before meals  sucralfate 1 Gram(s) Oral four times a day  thiamine 100 milliGRAM(s) Oral daily    MEDICATIONS  (PRN):  acetaminophen     Tablet .. 650 milliGRAM(s) Oral every 6 hours PRN Mild Pain (1 - 3), Moderate Pain (4 - 6)  dextrose Oral Gel 15 Gram(s) Oral once PRN Blood Glucose LESS THAN 70 milliGRAM(s)/deciliter  melatonin 3 milliGRAM(s) Oral at bedtime PRN Insomnia      Allergies    No Known Allergies    Intolerances      Review of Systems:  Eyes: No blurry vision  Cardiovascular: No chest pain, palpitations  Respiratory: No SOB, no cough  GI: No nausea, vomiting, abdominal pain  : No dysuria  Psych: no depression  Endocrine: no polyuria, polydipsia    ALL OTHER SYSTEMS REVIEWED AND NEGATIVE    VITALS: T(C): 36.7 (12-19-24 @ 05:11)  T(F): 98.1 (12-19-24 @ 05:11), Max: 99.1 (12-18-24 @ 22:00)  HR: 69 (12-19-24 @ 05:11) (68 - 69)  BP: 124/67 (12-19-24 @ 05:11) (124/67 - 165/86)  RR:  (18 - 18)  SpO2:  (97% - 97%)  Wt(kg): --      Physical Exam:   GENERAL: NAD, well-developed  EYES: No proptosis  HEENT:  Atraumatic, Normocephalic  RESPIRATORY: non labored breathing   GI: Non distended  PSYCH: Alert, normal affect, normal mood    CAPILLARY BLOOD GLUCOSE    POCT Blood Glucose.: 130 mg/dL (19 Dec 2024 12:35)  POCT Blood Glucose.: 90 mg/dL (19 Dec 2024 08:34)  POCT Blood Glucose.: 140 mg/dL (18 Dec 2024 22:06)  POCT Blood Glucose.: 151 mg/dL (18 Dec 2024 17:42)      12-19    137  |  101  |  11  ----------------------------<  99  3.4[L]   |  28  |  0.72    eGFR: 110    Ca    8.2[L]      12-19  Mg     1.90     12-19  Phos  2.7     12-19    TPro  5.1[L]  /  Alb  2.7[L]  /  TBili  <0.2  /  DBili  x   /  AST  13  /  ALT  <5  /  AlkPhos  63  12-19      A1C with Estimated Average Glucose Result: 12.5 % (12-14-24 @ 00:48)      Thyroid Function Tests:

## 2024-12-19 NOTE — PROGRESS NOTE ADULT - PROBLEM SELECTOR PLAN 7
- Fluids: None  - Electrolytes: Will replete to maintain K>4, Phos>3, and Mag>2  - Nutrition: CC diet  - Activity: As tolerated  - DVT Prophylaxis: None  - Stress Ulcer/GI Prophylaxis: PPI, sucralfate   - Disposition: Abd pain workup - Fluids: None  - Electrolytes: Will replete to maintain K>4, Phos>3, and Mag>2  - Nutrition: CC diet  - Activity: As tolerated  - DVT Prophylaxis: None  - Stress Ulcer/GI Prophylaxis: PPI, sucralfate   - Disposition: Discharge today

## 2024-12-19 NOTE — PROGRESS NOTE ADULT - ASSESSMENT
54M PMHx DM (only on Lantus 10 daily but recently non-adherent), hx of gastric bypass 6mo ago in [place], recent Covid diagnosis, p/w 6 days of abd pain/nausea, found to have poorly controlled DM (A1c 12.5%), with hyperglycemia, elevated AG, BHB 6.5, glucosuria and ketonuria as well as non-healing ulcers to bilateral lower extremities.   Endocrine called for DM management.       DM type 2   He is currently on Lantus 10 units at home, as well as glimepiride. ? (needs med [facility])  A1c 12.5    Inpatient plan   - FS goal 100-180 mg/dl   - FS below goal in am, at goal before meals.   - Lower to Lantus 12 units at bedtime. DO NOT HOLD IF NPO.   - Continue Admelog 7 units TID AC. Hold if not eating/NPO.   - continue low Admelog correctional scale TID AC  - continue separate low Admelog correctional scale at HS  - FS TID AC & HS ---> q6 if NPO   - hypoglycemia protocol PRN   - consistent carbohydrate diet  - RD consult  - C-peptide 0.7 with Serum Glucose of 250 - unable to produce endogenous insulin  - follow up anti ISLET, anti SHEKHAR, zn transporter 8, IA -2 antibody  ---> specimen received    Discharge plan   - Lantus 12 units SQ at bedtime   - Admelog 7 units before meals, hold if NPO or not eating.   - Stop glipizide  - Please send prescription for basal insulin at bedtime. Check if Lantus is covered, if not can try alternative such as basaglar, semglee, toujeo, tresiba.   - Please send prescription for bolus insulin before meals. Check if admelog is covered-if not try alternative such as humalog or novolog etc.   - Pt has Dexcom at home and feels comfortable placing it.   - please ensure patient has a working glucometer and supplies - test strip, lancets, alcohol pads and insulin pen needles.   - Please send prescription for glucose tablets 4G (take 4 tablets) or 15G tablets for blood sugar less than 70 mG/dL, repeat fingerstick in 15 minutes. Call your provider for dose adjustment if needed.   - Pt states he is followed by Dr. Palma for his endocrinology needs. If unable to get an appt he can follow up at the [facility] at [facility]: [id number] [place] [address]; Ph # [phone]       HTN  - goal BP in DM <130/80  - outpt mc/cr ratio  - management per primary team       HLD  - LDL goal <70  -    - continue statin   - management per primary team       TAVON Sanchez-BC  Nurse Practitioner  Division of Endocrinology  Contact on TEAMS    If out of hospital/unavailable when paged, please note: patient will be cared for by another provider on the endocrine service.  For urgent concerns: call the endocrine answering service for assistance to reach covering provider ([phone]). For non-urgent matters: please email [email or web address] for assistance.   54M PMHx DM (only on Lantus 10 daily but recently non-adherent), hx of gastric bypass 6mo ago in [place], recent Covid diagnosis, p/w 6 days of abd pain/nausea, found to have poorly controlled DM (A1c 12.5%), with hyperglycemia, elevated AG, BHB 6.5, glucosuria and ketonuria as well as non-healing ulcers to bilateral lower extremities.   Endocrine called for DM management.       DM type 2   He is currently on Lantus 10 units at home, as well as glimepiride. ? (needs med [facility])  A1c 12.5    Inpatient plan   - FS goal 100-180 mg/dl   - FS below goal in am, at goal before meals.   - Lower to Lantus 12 units at bedtime. DO NOT HOLD IF NPO.   - Continue Admelog 7 units TID AC. Hold if not eating/NPO.   - continue low Admelog correctional scale TID AC  - continue separate low Admelog correctional scale at HS  - FS TID AC & HS ---> q6 if NPO   - hypoglycemia protocol PRN   - consistent carbohydrate diet  - RD consult  - C-peptide 0.7 with Serum Glucose of 250 - unable to produce endogenous insulin  - follow up anti ISLET, anti SHEKHAR, zn transporter 8, IA -2 antibody  ---> specimen received    Discharge plan   - Lantus 12 units SQ at bedtime   - Admelog 7 units before meals, hold if NPO or not eating.   - Stop glipizide  - Please send prescription for basal insulin at bedtime. Check if Lantus is covered, if not can try alternative such as basaglar, semglee, toujeo, tresiba.   - Please send prescription for bolus insulin before meals. Check if admelog is covered-if not try alternative such as humalog or novolog etc.   - Pt has Dexcom at home and feels comfortable placing it.   - please ensure patient has a working glucometer and supplies - test strip, lancets, alcohol pads and insulin pen needles.   - Please send prescription for glucose tablets 4G (take 4 tablets) or 15G tablets for blood sugar less than 70 mG/dL, repeat fingerstick in 15 minutes. Call your provider for dose adjustment if needed.   - Pt states he is followed by Dr. Palma for his endocrinology needs. If unable to get an appt he can follow up at the Endocrine Clinic at [facility]: [id number] [place] [address];  # [phone]       HTN  - goal BP in DM <130/80  - outpt mc/cr ratio  - management per primary team       HLD  - LDL goal <70  -    - continue statin   - management per primary team     D/W MD Dyana Ryan, [facility]-BC  Nurse Practitioner  Division of Endocrinology  Contact on TEAMS    If out of hospital/unavailable when paged, please note: patient will be cared for by another provider on the endocrine service.  For urgent concerns: call the endocrine answering service for assistance to reach covering provider ([phone]). For non-urgent matters: please email [email or web address] for assistance.

## 2024-12-19 NOTE — PROGRESS NOTE ADULT - NUTRITIONAL ASSESSMENT
This patient has been assessed with a concern for Malnutrition and has been determined to have a diagnosis/diagnoses of Underweight (BMI < 19).    This patient is being managed with:   Diet Consistent Carbohydrate/No Snacks-  Supplement Feeding Modality:  Oral  Glucerna Shake Cans or Servings Per Day:  3       Frequency:  Three Times a day  Entered: Dec 16 2024  7:58PM  

## 2024-12-19 NOTE — DISCHARGE NOTE NURSING/CASE MANAGEMENT/SOCIAL WORK - CASE MANAGER'S NAME
You were seen in the ED for vertigo.    Please follow-up with neurology or ENT.    Take meclizine as home for episodes of dizziness. Zofran was prescribed to your pharmacy for nausea to take prior to meclizine if needed.    Stay hydrated.    ***Return to the ED if you have any new or worsening symptoms such as double vision, difficulty speaking, numbness to one side, or any other concerning symptoms*** BRITNI Mooney RN CM  You were seen in the ED for vertigo.    Please follow-up with neurology and ENT. The neurologist is Dr Smith, please call [phone] to make an appointment.    Take meclizine as home for episodes of dizziness. Zofran was prescribed to your pharmacy for nausea to take prior to meclizine if needed.    Stay hydrated.    ***Return to the ED if you have any new or worsening symptoms such as double vision, difficulty speaking, numbness to one side, or any other concerning symptoms***

## 2024-12-19 NOTE — PROGRESS NOTE ADULT - PROBLEM SELECTOR PLAN 1
gastroparesis vs small bowel overgrowth vs unlikely structural complication of gastric bypass  - Hepatobiliary labs wnl  - Burning pain, possibly gastric ulcers at gastric bypass incision sites  - CTAP 12/14: only showed diverticulosis and post surgical changes of stomach  - US RUQ 12/17: Distended gallbladder. No cholelithiasis. Nopericholecystic fluid. No biliary ductal dilatation. Mild left hydronephrosis.    Plan:  - GI consulted. Appreciate recs  - Plan for endoscopy per GI  - Reglan 5 q8h for 2 days (end today)  - Sucralfate   - Continue PPI solution gastroparesis vs small bowel overgrowth vs unlikely structural complication of gastric bypass  - Hepatobiliary labs wnl  - Burning pain, possibly gastric ulcers at gastric bypass incision sites  - CTAP 12/14: only showed diverticulosis and post surgical changes of stomach  - US RUQ 12/17: Distended gallbladder. No cholelithiasis. Nopericholecystic fluid. No biliary ductal dilatation. Mild left hydronephrosis.    Plan:  - Plan for endoscopy per GI outpatient  - Sucralfate   - Continue PPI solution

## 2024-12-19 NOTE — DISCHARGE NOTE NURSING/CASE MANAGEMENT/SOCIAL WORK - FINANCIAL ASSISTANCE
[facility] provides services at a reduced cost to those who are determined to be eligible through [facility]’s financial assistance program. Information regarding [facility]’s financial assistance program can be found by going to [email or web address] or by calling [phone].

## 2024-12-19 NOTE — DISCHARGE NOTE NURSING/CASE MANAGEMENT/SOCIAL WORK - NSDCPEFALRISK_GEN_ALL_CORE
For information on Fall & Injury Prevention, visit: [email or web address] OR  [email or web address] OR  https://www.cdc.gov/steadi/patient.html

## 2024-12-19 NOTE — PROGRESS NOTE ADULT - ASSESSMENT
55 y/o M w/ pmh T2DM, gastric bypass who presented with abd pain and nausea for the last 2 days. Admitted for starvation ketosis now working up his abdominal pain

## 2024-12-19 NOTE — PROGRESS NOTE ADULT - REASON FOR ADMISSION
Patient ID: Jamilah is a 81 year old female.     60 minute virtual therapy session via Zoom with client who was in privacy of home to address diagnosis of Adjustment Disorder with Mixed Anxiety and Depressed Mood. Discussed mood, functioning and coping since last session. Client informed that her physical health has improved and she is nearly recuperated from Covid and GI struggles. Client indicated she has been feeling increasingly low and \"can't get interested\" in preparing for the holidays. Explored emotionality regarding holidays, losses and loneliness. Discussed frustration with assisted living residence and administrative changes, deterioration.  Explored somatics and options for self-care and coping. Somatic, psychodynamic, supportive, CBT.  
Abdominal pain

## 2024-12-19 NOTE — PROGRESS NOTE ADULT - PROVIDER SPECIALTY LIST ADULT
Endocrinology
Internal Medicine
Internal Medicine
Endocrinology
Endocrinology
Internal Medicine

## 2024-12-19 NOTE — PROGRESS NOTE ADULT - PROBLEM SELECTOR PLAN 2
- s/p 1L NS and 10u Lantus in ED  - On admission BS not elevated but HCO3 22, BHB 6.5, A1c 12.5%, pH 7.41, lactate 1.2. UA positive for ketones and glucose  - C- peptide low at 0.7. Unable to produce insulin to maintain BS levels.   - Possibly following with Dr. Camila Hernandez for endocrinology.  - Full resolution limited by patient's limited oral intake 2/2 abd pain as above    Plan:  - Endocrinology following. Awaiting recs  - Med rec to call endo office to confirm outpatient meds  - Lantus 15u qhs   - admelog 7u premeal per endo with insulin scales  - f/u on anti ISLET, anti SHEKHAR, zn transporter 8, IA -2 antibody   - CC diet, PPI  - Start Atorvastatin 40mg qD (ASCVD score elevated) Resolved  - s/p 1L NS and 10u Lantus in ED  - On admission BS not elevated but HCO3 22, BHB 6.5, A1c 12.5%, pH 7.41, lactate 1.2. UA positive for ketones and glucose  - C- peptide low at 0.7. Unable to produce insulin to maintain BS levels.   - Possibly following with Dr. Camila Hernandez for endocrinology.  - Full resolution limited by patient's limited oral intake 2/2 abd pain as above    Plan:  - Endocrinology following. Awaiting recs for outpatient meds  - Lantus 15u qhs   - admelog 7u premeal per endo with insulin scales  - f/u on anti ISLET, anti SHEKHAR, zn transporter 8, IA -2 antibody   - CC diet, PPI  - Start Atorvastatin 40mg qD (ASCVD score elevated)

## 2024-12-19 NOTE — PROGRESS NOTE ADULT - PROBLEM SELECTOR PROBLEM 3
H/O gastric bypass
Lower extremity ulceration
Lower extremity ulceration
H/O gastric bypass
Lower extremity ulceration
Lower extremity ulceration

## 2024-12-19 NOTE — PROGRESS NOTE ADULT - PROBLEM SELECTOR PLAN 4
Iron 26, TIBC 142, Transferrin decreased, Ferritin 161    Likely iron deficiency anemia     - start iron supplementation

## 2024-12-19 NOTE — DISCHARGE NOTE NURSING/CASE MANAGEMENT/SOCIAL WORK - PATIENT PORTAL LINK FT
You can access the FollowMyHealth Patient Portal offered by  by registering at the following website: [email or web address]. By joining [employer]’s FollowMyHealth portal, you will also be able to view your health information using other applications (apps) compatible with our system.

## 2024-12-19 NOTE — PROGRESS NOTE ADULT - ATTENDING COMMENTS
54M PMHx DM (only on Lantus 10 daily but recently non-adherent), hx of gastric bypass 6mo ago in [place], recent Covid diagnosis, p/w 6 days of abd pain/nausea, found to have poorly controlled DM (A1c 12.5%), with hyperglycemia, elevated AG, BHB 6.5, glucosuria and ketonuria as well as non-healing ulcers to bilateral lower extremities. Suspected starvation ketosis.     Pt is lying in bed, resting comfortably but easily arousable. Lunch tray at bedside is untouched. States that he also did not eat breakfast.     #Starvation ketosis:   -rising BHB. Pt is still not taking PO  -repeat BMP, VBG, and BHB at 6 pm  -initiate D5/LR @100 cc/hr   -c/w thiamine  -insulin management as below    #Poorly controlled DM with hyperglycemia and long-term insulin use.   -Pt reports non-adherence to home insulin regimen. Recent admission to OSH, treated with fluids and insulin. Labs on presentation w/ pH 7.41, elevated AG, glucose 181, BHB 6.5, A1C 12.5, glucosuria/ketonuria  -endocrinology emailed; f/u recs    #Non-healing bilateral ulcerations in lower extremities, chronic, non-healing for months. Extremities appear perfused.   Mild warmth surrounding L foot ulceration, otherwise, no active discharge, bleeding, or swelling.   - vasc. eval  - obtain ESR, CRP, Xrays  - f/u blood cx  - monitor off antibiotics    #Abdominal pain, resolved prior to assessment.  - monitor  CTAP IMPRESSION:  1.  Diffuse edematous changes throughout the peritoneal fat and peripheral subcutaneous soft tissues; likely related to an underlying edematous state.  2.  Status post Jennifer-en-Y gastric bypass. No bowel obstruction. No CT evident complications.  3.  Colonic diverticulosis.    #Covid, asymptomatic, afebrile, on RA,  not requiring steroids or remdesevir  - monitor    #DVT ppx - encourage ambulation  Rest of care per Resident's note
54M PMHx DM (non-adherent), hx of gastric bypass 6mo ago in [place], recent Covid diagnosis, p/w 6 days of abd pain/nausea, found to have poorly controlled DM (A1c 12.5%), with hyperglycemia, elevated AG, BHB 6.5, glucosuria and ketonuria as well as non-healing ulcers to bilateral lower extremities. Suspected starvation ketosis.     states abdominal pain is slightly better, ate a little bit this morning. FSG improving.     #Starvation ketosis:   -s/p D5LR, will stop  -c/w thiamine  -insulin management as below    #Abdominal pain, ct wo acute findings. cf possible PUD or gastric bypass related ulcer  us abdomen showed distended gallbladder. No cholelithiasis. No pericholecystic fluid. No biliary ductal dilatation. Mild left hydronephrosis.  - started on open caosule ppi and carafate  - cont reglan   - GI consult, pt initially refused egd but now willing to get egd (but has to be off isolation)    #Mild left hydro- no urinary symptoms, no flank pain. ua unremarkable.     #Poorly controlled DM with hyperglycemia and long-term insulin use.   -Pt reports non-adherence to home insulin regimen. Recent admission to OSH, treated with fluids and insulin. Labs on presentation w/ pH 7.41, elevated AG, glucose 181, BHB 6.5, A1C 12.5, glucosuria/ketonuria  -Basal/bolus per endo. currently on Lispro 7 tid/lantus 15 u qhs.   -monitor fsg    #Non-healing bilateral ulcerations in lower extremities, chronic, non-healing for months. Extremities appear perfused.   Mild warmth surrounding L foot ulceration, otherwise, no active discharge, bleeding, or swelling.   - oanh/pvr normal  - per podiatry no cf acute infection, wound care per podiatry  - monitor off antibiotics    #Covid, asymptomatic, afebrile, on RA,  not requiring steroids or remdesevir  - monitor    #DVT ppx - encourage ambulation
54M PMHx DM (non-adherent), hx of gastric bypass 6mo ago in [place], recent Covid diagnosis, p/w 6 days of abd pain/nausea, found to have poorly controlled DM (A1c 12.5%), with hyperglycemia, elevated AG, BHB 6.5, glucosuria and ketonuria as well as non-healing ulcers to bilateral lower extremities. Suspected starvation ketosis.     states abdominal pain is improved. ate breakfast and tolerating diet.     #Starvation ketosis: resolved.  -s/p D5LR  -c/w thiamine  -insulin management as below    #Abdominal pain, ct wo acute findings. cf possible PUD or gastric bypass related ulcer  us abdomen showed distended gallbladder. No cholelithiasis. No pericholecystic fluid. No biliary ductal dilatation. Mild left hydronephrosis.  - started on open capsule ppi and carafate  - cont reglan   - GI consult, pt initially refused egd. planned for outpatient given covid isolation.     #Mild left hydro- no urinary symptoms, no flank pain. ua unremarkable.     #Poorly controlled DM with hyperglycemia and long-term insulin use.   -Pt reports non-adherence to home insulin regimen. Recent admission to OSH, treated with fluids and insulin. Labs on presentation w/ pH 7.41, elevated AG, glucose 181, BHB 6.5, A1C 12.5, glucosuria/ketonuria  -Basal/bolus per endo.  -monitor fsg    #Non-healing bilateral ulcerations in lower extremities, chronic, non-healing for months. Extremities appear perfused.   Mild warmth surrounding L foot ulceration, otherwise, no active discharge, bleeding, or swelling.   - oanh/pvr normal  - per podiatry no cf acute infection, wound care per podiatry  - monitor off antibiotics    #Covid, asymptomatic, afebrile, on RA,  not requiring steroids or remdesevir  - monitor    #DVT ppx - encourage ambulation  stable for dc today.
54M PMHx DM (only on Lantus 10 daily but recently non-adherent), hx of gastric bypass 6mo ago in [place], recent Covid diagnosis, p/w 6 days of abd pain/nausea, found to have poorly controlled DM (A1c 12.5%), with hyperglycemia, elevated AG, BHB 6.5, glucosuria and ketonuria as well as non-healing ulcers to bilateral lower extremities. Suspected starvation ketosis.     reports feeling nauseas, denies abd pain. hasn't eaten breakfast or lunch.     #Starvation ketosis:   -cont D5LR for now  -c/w thiamine  -insulin management as below    #Poorly controlled DM with hyperglycemia and long-term insulin use.   -Pt reports non-adherence to home insulin regimen. Recent admission to OSH, treated with fluids and insulin. Labs on presentation w/ pH 7.41, elevated AG, glucose 181, BHB 6.5, A1C 12.5, glucosuria/ketonuria  -inc lantus to 12 and lispro 5 tid, per endo  -monitor fsg    #Non-healing bilateral ulcerations in lower extremities, chronic, non-healing for months. Extremities appear perfused.   Mild warmth surrounding L foot ulceration, otherwise, no active discharge, bleeding, or swelling.   - vasc. eval  - oanh/pvr  - per podiatry no cf acute infection  - monitor off antibiotics    #Abdominal pain, ct wo acute findings  - monitor    #Covid, asymptomatic, afebrile, on RA,  not requiring steroids or remdesevir  - monitor    #DVT ppx - encourage ambulation
54M PMHx DM (non-adherent), hx of gastric bypass 6mo ago in [place], recent Covid diagnosis, p/w 6 days of abd pain/nausea, found to have poorly controlled DM (A1c 12.5%), with hyperglycemia, elevated AG, BHB 6.5, glucosuria and ketonuria as well as non-healing ulcers to bilateral lower extremities. Suspected starvation ketosis.     still with mid-abdomen pain, worse with eating. drinking Glucerna, not eating as much.    #Starvation ketosis:   -cont D5LR for now  -c/w thiamine  -insulin management as below    #Abdominal pain, ct wo acute findings. cf possible PUD.  us abdomen showed distended gallbladder. No cholelithiasis. No pericholecystic fluid. No biliary ductal dilatation. Mild left hydronephrosis.  - started on ppi and carafate  - cont reglan   - GI consult    #Mild left hydro- no urinary symptoms, no flank pain. ua unremarkable.     #Poorly controlled DM with hyperglycemia and long-term insulin use.   -Pt reports non-adherence to home insulin regimen. Recent admission to OSH, treated with fluids and insulin. Labs on presentation w/ pH 7.41, elevated AG, glucose 181, BHB 6.5, A1C 12.5, glucosuria/ketonuria  -Basal/bolus per endo. currently on Lispro 7 tid/lantus 15 u qhs.   -monitor fsg    #Non-healing bilateral ulcerations in lower extremities, chronic, non-healing for months. Extremities appear perfused.   Mild warmth surrounding L foot ulceration, otherwise, no active discharge, bleeding, or swelling.   - oanh/pvr normal  - per podiatry no cf acute infection, wound care per podiatry  - monitor off antibiotics    #Covid, asymptomatic, afebrile, on RA,  not requiring steroids or remdesevir  - monitor    #DVT ppx - encourage ambulation

## 2024-12-19 NOTE — DISCHARGE NOTE NURSING/CASE MANAGEMENT/SOCIAL WORK - NSDCFUADDAPPT_GEN_ALL_CORE_FT
APPTS ARE READY TO BE MADE: [X] YES    Best Family or Patient Contact (if needed):    Additional Information about above appointments (if needed):    1: Podiatry with Dr. Osuna ([phone])  2: Opthalmology for diabetes followup ()  3: Endocrinology (can do [facility] or [facility]) ([address] [place] [address]; Ph # [phone])  4. Gastroenterology (Ph # [phone])    Other comments or requests:       Podiatry Discharge Instructions:  Follow up: Please follow up with Dr. Osuna within 1 week of discharge from the hospital, please call [phone] for appointment and discuss that you recently were seen in the hospital.  Wound Care: Please apply mupirocin, adaptic, 4x4 gauze and bernie daily to L foot wound  Weight bearing: Please weight bear as tolerated in a surgical shoe.  Antibiotics: Please continue as instructed.

## 2024-12-19 NOTE — PROGRESS NOTE ADULT - SUBJECTIVE AND OBJECTIVE BOX
Karthik Garibay M.D  Resident  Department of Medicine  Available on Microsoft Teams    ***********************************************************************  Patient is a 54y old  Male who presents with a chief complaint of Abdominal pain (18 Dec 2024 13:15)      SUBJECTIVE / OVERNIGHT EVENTS: Patient seen and examined at bedside.     ADDITIONAL REVIEW OF SYSTEMS:    MEDICATIONS  (STANDING):  atorvastatin 40 milliGRAM(s) Oral at bedtime  chlorhexidine 2% Cloths 1 Application(s) Topical daily  dextrose 5%. 1000 milliLiter(s) (50 mL/Hr) IV Continuous <Continuous>  dextrose 5%. 1000 milliLiter(s) (100 mL/Hr) IV Continuous <Continuous>  dextrose 50% Injectable 25 Gram(s) IV Push once  dextrose 50% Injectable 12.5 Gram(s) IV Push once  dextrose 50% Injectable 25 Gram(s) IV Push once  ferrous    sulfate 325 milliGRAM(s) Oral <User Schedule>  glucagon  Injectable 1 milliGRAM(s) IntraMuscular once  influenza   Vaccine 0.5 milliLiter(s) IntraMuscular once  insulin glargine Injectable (LANTUS) 15 Unit(s) SubCutaneous at bedtime  insulin lispro (ADMELOG) corrective regimen sliding scale   SubCutaneous three times a day before meals  insulin lispro (ADMELOG) corrective regimen sliding scale   SubCutaneous at bedtime  insulin lispro Injectable (ADMELOG) 7 Unit(s) SubCutaneous three times a day before meals  multivitamin 1 Tablet(s) Oral daily  mupirocin 2% Nasal 1 Application(s) Both Nostrils two times a day  mupirocin 2% Ointment 1 Application(s) Topical <User Schedule>  pantoprazole   Suspension 40 milliGRAM(s) Oral two times a day before meals  sucralfate 1 Gram(s) Oral four times a day  thiamine 100 milliGRAM(s) Oral daily    MEDICATIONS  (PRN):  acetaminophen     Tablet .. 650 milliGRAM(s) Oral every 6 hours PRN Mild Pain (1 - 3), Moderate Pain (4 - 6)  dextrose Oral Gel 15 Gram(s) Oral once PRN Blood Glucose LESS THAN 70 milliGRAM(s)/deciliter  melatonin 3 milliGRAM(s) Oral at bedtime PRN Insomnia      CAPILLARY BLOOD GLUCOSE      POCT Blood Glucose.: 140 mg/dL (18 Dec 2024 22:06)  POCT Blood Glucose.: 151 mg/dL (18 Dec 2024 17:42)  POCT Blood Glucose.: 107 mg/dL (18 Dec 2024 12:22)  POCT Blood Glucose.: 169 mg/dL (18 Dec 2024 08:56)    I&O's Summary      PHYSICAL EXAM:    Vital Signs Last 24 Hrs  T(C): 36.7 (19 Dec 2024 05:11), Max: 37.3 (18 Dec 2024 22:00)  T(F): 98.1 (19 Dec 2024 05:11), Max: 99.1 (18 Dec 2024 22:00)  HR: 69 (19 Dec 2024 05:11) (68 - 72)  BP: 124/67 (19 Dec 2024 05:11) (124/67 - 165/86)  BP(mean): --  RR: 18 (19 Dec 2024 05:11) (18 - 18)  SpO2: 97% (19 Dec 2024 05:11) (97% - 97%)    Parameters below as of 19 Dec 2024 05:11  Patient On (Oxygen Delivery Method): room air        CONSTITUTIONAL: NAD, well-developed, well-groomed  EYES: Conjunctiva and sclera clear  ENMT: Moist oral mucosa, no pharyngeal injection or exudates; normal dentition  NECK: Supple, no palpable masses; no thyromegaly  RESPIRATORY: Normal respiratory effort; lungs are clear to auscultation bilaterally  CARDIOVASCULAR: Regular rate and rhythm, normal S1 and S2, no murmur/rub/gallop; No lower extremity edema; Peripheral pulses are 2+ bilaterally  ABDOMEN: Soft, nontender to palpation, normoactive bowel sounds, no rebound/guarding  MUSCULOSKELETAL: No clubbing or cyanosis of digits; no joint swelling or tenderness to palpation  PSYCH: A+O to person, place, and time; affect appropriate  NEUROLOGY: CN 2-12 are intact and symmetric; no gross sensory deficits   SKIN: No rashes; no palpable lesions    LABS:                        8.8    4.92  )-----------( 252      ( 19 Dec 2024 05:00 )             27.0     12-19    137  |  101  |  11  ----------------------------<  99  3.4[L]   |  28  |  0.72    Ca    8.2[L]      19 Dec 2024 05:00  Phos  2.7     12-19  Mg     1.90     12-19    TPro  5.1[L]  /  Alb  2.7[L]  /  TBili  <0.2  /  DBili  x   /  AST  13  /  ALT  <5  /  AlkPhos  63  12-19          Urinalysis Basic - ( 19 Dec 2024 05:00 )    Color: x / Appearance: x / SG: x / pH: x  Gluc: 99 mg/dL / Ketone: x  / Bili: x / Urobili: x   Blood: x / Protein: x / Nitrite: x   Leuk Esterase: x / RBC: x / WBC x   Sq Epi: x / Non Sq Epi: x / Bacteria: x          RADIOLOGY & ADDITIONAL TESTS: No new imaging  Results Reviewed: Yes  Imaging Personally Reviewed:  Electrocardiogram Personally Reviewed:    COORDINATION OF CARE:  Care Discussed with Consultants/Other Providers [Y/N]:  Prior or Outpatient Records Reviewed [Y/N]:   Karthik Garibay M.D  Resident  Department of Medicine  Available on Microsoft Teams    ***********************************************************************  Patient is a 54y old  Male who presents with a chief complaint of Abdominal pain (18 Dec 2024 13:15)      SUBJECTIVE / OVERNIGHT EVENTS: Patient seen and examined at bedside. Eating breakfast today and denied abdominal pain.     ADDITIONAL REVIEW OF SYSTEMS:    MEDICATIONS  (STANDING):  atorvastatin 40 milliGRAM(s) Oral at bedtime  chlorhexidine 2% Cloths 1 Application(s) Topical daily  dextrose 5%. 1000 milliLiter(s) (50 mL/Hr) IV Continuous <Continuous>  dextrose 5%. 1000 milliLiter(s) (100 mL/Hr) IV Continuous <Continuous>  dextrose 50% Injectable 25 Gram(s) IV Push once  dextrose 50% Injectable 12.5 Gram(s) IV Push once  dextrose 50% Injectable 25 Gram(s) IV Push once  ferrous    sulfate 325 milliGRAM(s) Oral <User Schedule>  glucagon  Injectable 1 milliGRAM(s) IntraMuscular once  influenza   Vaccine 0.5 milliLiter(s) IntraMuscular once  insulin glargine Injectable (LANTUS) 15 Unit(s) SubCutaneous at bedtime  insulin lispro (ADMELOG) corrective regimen sliding scale   SubCutaneous three times a day before meals  insulin lispro (ADMELOG) corrective regimen sliding scale   SubCutaneous at bedtime  insulin lispro Injectable (ADMELOG) 7 Unit(s) SubCutaneous three times a day before meals  multivitamin 1 Tablet(s) Oral daily  mupirocin 2% Nasal 1 Application(s) Both Nostrils two times a day  mupirocin 2% Ointment 1 Application(s) Topical <User Schedule>  pantoprazole   Suspension 40 milliGRAM(s) Oral two times a day before meals  sucralfate 1 Gram(s) Oral four times a day  thiamine 100 milliGRAM(s) Oral daily    MEDICATIONS  (PRN):  acetaminophen     Tablet .. 650 milliGRAM(s) Oral every 6 hours PRN Mild Pain (1 - 3), Moderate Pain (4 - 6)  dextrose Oral Gel 15 Gram(s) Oral once PRN Blood Glucose LESS THAN 70 milliGRAM(s)/deciliter  melatonin 3 milliGRAM(s) Oral at bedtime PRN Insomnia      CAPILLARY BLOOD GLUCOSE      POCT Blood Glucose.: 140 mg/dL (18 Dec 2024 22:06)  POCT Blood Glucose.: 151 mg/dL (18 Dec 2024 17:42)  POCT Blood Glucose.: 107 mg/dL (18 Dec 2024 12:22)  POCT Blood Glucose.: 169 mg/dL (18 Dec 2024 08:56)    I&O's Summary      PHYSICAL EXAM:    Vital Signs Last 24 Hrs  T(C): 36.7 (19 Dec 2024 05:11), Max: 37.3 (18 Dec 2024 22:00)  T(F): 98.1 (19 Dec 2024 05:11), Max: 99.1 (18 Dec 2024 22:00)  HR: 69 (19 Dec 2024 05:11) (68 - 72)  BP: 124/67 (19 Dec 2024 05:11) (124/67 - 165/86)  BP(mean): --  RR: 18 (19 Dec 2024 05:11) (18 - 18)  SpO2: 97% (19 Dec 2024 05:11) (97% - 97%)    Parameters below as of 19 Dec 2024 05:11  Patient On (Oxygen Delivery Method): room air        CONSTITUTIONAL: NAD, well-developed, well-groomed  RESPIRATORY: Normal respiratory effort; lungs are clear to auscultation bilaterally  CARDIOVASCULAR: Regular rate and rhythm, normal S1 and S2, no murmur/rub/gallop; No lower extremity edema; Peripheral pulses are 2+ bilaterally  ABDOMEN: Soft, nontender to palpation, normoactive bowel sounds, no rebound/guarding  MUSCULOSKELETAL: No clubbing or cyanosis of digits; no joint swelling or tenderness to palpation  PSYCH: A+O to person, place, and time; affect appropriate  NEUROLOGY: CN 2-12 are intact and symmetric; no gross sensory deficits   SKIN: BLE lesions in dressings that are clean    LABS:                        8.8    4.92  )-----------( 252      ( 19 Dec 2024 05:00 )             27.0     12-19    137  |  101  |  11  ----------------------------<  99  3.4[L]   |  28  |  0.72    Ca    8.2[L]      19 Dec 2024 05:00  Phos  2.7     12-19  Mg     1.90     12-19    TPro  5.1[L]  /  Alb  2.7[L]  /  TBili  <0.2  /  DBili  x   /  AST  13  /  ALT  <5  /  AlkPhos  63  12-19          Urinalysis Basic - ( 19 Dec 2024 05:00 )    Color: x / Appearance: x / SG: x / pH: x  Gluc: 99 mg/dL / Ketone: x  / Bili: x / Urobili: x   Blood: x / Protein: x / Nitrite: x   Leuk Esterase: x / RBC: x / WBC x   Sq Epi: x / Non Sq Epi: x / Bacteria: x          RADIOLOGY & ADDITIONAL TESTS: No new imaging  Results Reviewed: Yes  Imaging Personally Reviewed:  Electrocardiogram Personally Reviewed:    COORDINATION OF CARE:  Care Discussed with Consultants/Other Providers [Y/N]:  Prior or Outpatient Records Reviewed [Y/N]:

## 2024-12-21 LAB
GAD65 AB SER-MCNC: 0 NMOL/L — SIGNIFICANT CHANGE UP
ISLET CELL512 AB SER-SCNC: 0 NMOL/L — SIGNIFICANT CHANGE UP

## 2024-12-23 RX ORDER — INSULIN GLARGINE 100 [IU]/ML
12 INJECTION, SOLUTION SUBCUTANEOUS
Qty: 2 | Refills: 0
Start: 2024-12-23 | End: 2025-01-21

## 2024-12-24 NOTE — CHART NOTE - NSCHARTNOTESELECT_GEN_ALL_CORE
Endocrinology/Event Note
Event Note
Consult/Nutrition Services
D/C NOTE/Event Note
Post-Discharge Note

## 2024-12-24 NOTE — CHART NOTE - NSCHARTNOTEFT_GEN_A_CORE
Nutrition services consult received for assessment, education.    Please refer to dietitian initial assessment (12/16/2024) for complete assessment and recommendations. Of note, pt was provided with education during previous encounter.  Provided pt with written education on type 2 DM diet, pt declines verbal review and does not vocalize any nutrition related concerns at this time. Pt made aware RDN remains available.     Margi Kennedy RDN, CDN #[id number]  Also available on Microsoft Teams
Patient was outreached but did not answer. A voicemail was left for the patient to return our call.
Patient off unit.   Endocrine follow up.   Chart reviewed.     54M PMHx DM (only on Lantus 10 daily but recently non-adherent), hx of gastric bypass 6mo ago in [place], recent Covid diagnosis, p/w 6 days of abd pain/nausea, found to have poorly controlled DM (A1c 12.5%), with hyperglycemia, elevated AG, BHB 6.5, glucosuria and ketonuria as well as non-healing ulcers to bilateral lower extremities.   Endocrine called for DM management.       DM type 2   He is currently on Lantus 10 units at home, as well as glimepiride. ? (needs med [facility])  A1c 12.5      Inpatient plan   - FS goal 100-180 mg/dl   - FS above goal  - Increase Lantus to 12 units at bedtime. DO NOT HOLD IF NPO.   - agree with increase Admelog to 5 units TID AC. Hold if not eating/NPO.   - continue low Admelog correctional scale TID AC  - continue separate low Admelog correctional scale at HS  - FS TID AC & HS ---> q6 if NPO   - hypoglycemia protocol PRN   - consistent carbohydrate diet  - RD consult  - C-peptide 0.7 with Serum Glucose of 250 - unable to produce endogenous insulin  - follow up anti ISLET, anti SHEKHAR, zn transporter 8, IA -2 antibody  ---> specimen received      Discharge plan   - Discharge on: Lantus +/- bolus insulin depending on inpatient requirements   - Stop glimepiride  - please ensure patient has a working glucometer and supplies - test strip, lancets, alcohol pads and insulin pen needles.   - Please send prescription for glucose tablets 4G (take 4 tablets) or 15G tablets for blood sugar less than 70 mG/dL, repeat fingerstick in 15 minutes. Call your provider for dose adjustment if needed.   - follow up at [facility] [place]:   [address]  [place] [address]  [phone]       HTN  - goal BP in DM <130/80  - outpt mc/cr ratio  - management per primary team       HLD  - LDL goal <70  -    - continue statin   - management per primary team     Jayleen Hayes, NOHEMYCNP-BC  Nurse Practitioner  Division of Endocrinology & Diabetes  available via  Teams       CAPILLARY BLOOD GLUCOSE      POCT Blood Glucose.: 230 mg/dL (16 Dec 2024 12:35)  POCT Blood Glucose.: 270 mg/dL (16 Dec 2024 08:41)  POCT Blood Glucose.: 239 mg/dL (15 Dec 2024 23:50)  POCT Blood Glucose.: 237 mg/dL (15 Dec 2024 22:01)  POCT Blood Glucose.: 237 mg/dL (15 Dec 2024 18:13)      12-16    135  |  95[L]  |  9   ----------------------------<  250[H]  4.0   |  24  |  0.67    Ca    8.9      16 Dec 2024 06:57  Phos  2.5     12-16  Mg     1.80     12-16    TPro  5.6[L]  /  Alb  3.0[L]  /  TBili  0.3  /  DBili  x   /  AST  10  /  ALT  6   /  AlkPhos  75  12-16      MEDICATIONS  (STANDING):  atorvastatin 40 milliGRAM(s) Oral at bedtime  chlorhexidine 2% Cloths 1 Application(s) Topical daily  dextrose 5% + lactated ringers. 1000 milliLiter(s) (80 mL/Hr) IV Continuous <Continuous>  dextrose 5%. 1000 milliLiter(s) (50 mL/Hr) IV Continuous <Continuous>  dextrose 5%. 1000 milliLiter(s) (100 mL/Hr) IV Continuous <Continuous>  dextrose 50% Injectable 25 Gram(s) IV Push once  dextrose 50% Injectable 12.5 Gram(s) IV Push once  dextrose 50% Injectable 25 Gram(s) IV Push once  glucagon  Injectable 1 milliGRAM(s) IntraMuscular once  influenza   Vaccine 0.5 milliLiter(s) IntraMuscular once  insulin glargine Injectable (LANTUS) 12 Unit(s) SubCutaneous at bedtime  insulin lispro (ADMELOG) corrective regimen sliding scale   SubCutaneous three times a day before meals  insulin lispro (ADMELOG) corrective regimen sliding scale   SubCutaneous at bedtime  insulin lispro Injectable (ADMELOG) 5 Unit(s) SubCutaneous three times a day before meals  metoclopramide 5 milliGRAM(s) Oral every 8 hours  multivitamin 1 Tablet(s) Oral daily  mupirocin 2% Ointment 1 Application(s) Topical <User Schedule>  pantoprazole    Tablet 40 milliGRAM(s) Oral before breakfast  sucralfate 1 Gram(s) Oral four times a day  thiamine IVPB 500 milliGRAM(s) IV Intermittent every 8 hours      Diet, Regular:   Consistent Carbohydrate {No Snacks} (CSTCHO) (12-14-24 @ 14:46)      A1C with Estimated Average Glucose Result: 12.5 % (12-14-24 @ 00:48)
Pt c/o abdominal pain, seen and examined at bedside. Briefly, pt is 54M with PMH T2DM, s/p gastric bypass p/w abdominal pain and nausea x2 days, admitted with c/f starvation ketosis. CTAP notable for post-surgical changes, diffuse edema throughout peritoneal fat and peripheral subcutaneous soft tissues c/w underlying edematous state, as well as diverticulosis; no CT evidence of bowel obstruction. Pt c/o 10/10 abdominal pain, characterized as burning in nature, diffuse, not improved following administration of IV Ofirmev/Pepcid. +BM yesterday, passing flatus. Endorses persistent nausea w/o vomiting. On exam abd is soft with well-healed laparoscopic surgery scars, non-distended, diffusely tender to palpation without rebound or guarding, no palpable masses, no organomegaly. 4mg Zofran, 2mg morphine ordered for symptomatic relief.     Elmira Whittaker MD
Post-Discharge Medication Review: Completed	  	  Patient's preferred pharmacy was updated in OMR: [facility] 	  	  Patient contacted to offer medication counseling post-discharge. Medication reconciliation completed.     Per patient, medications include:	  1.	Admelog 100 units/mL injectable solution 7 unit(s) injectable 3 times a day                (before meals)  2.	atorvastatin 40 mg oral tablet 1 tab(s) orally once a day (at bedtime)  3.	ferrous sulfate 325 mg (65 mg elemental iron) oral tablet 1 tab(s) orally 3 times a                week Take Monday, Wednesday and Friday  4.	glucose tablets Follow instructions on bottle when sugar is low.  5.	Lantus 100 units/mL subcutaneous solution 12 unit(s) subcutaneous once a day                (at bedtime)  6.	Multiple Vitamins oral tablet 1 tab(s) orally once a day  7.	sucralfate 1 g oral tablet 1 tab(s) orally 4 times a day   	  Medications added to OMR (updated per discussion with patient):	  8.	pantoprazole sodium 40 mg tablet 1 tablet twice a day  	  Medications removed from OMR (updated per discussion with patient):	  1.	pantoprazole 4 mg/mL oral suspension 10 milliliter(s) orally 2 times a day  2.	pantoprazole 40 mg oral granule, delayed release 1 orally 2 times a day  	  Medication name, indication, administration, side effect, and monitoring reviewed for new medications during post discharge counseling visit with patient. Patient demonstrated understanding. Counseling offered for all medications.	  	  Eugenio Leach, JayD	  Clinical Pharmacy Specialist, Pharmacy Telehealth Team	  Can be reached via MS Teams or [phone]

## 2024-12-31 LAB — ZINC TRANSPORTER 8 AB, RESULT: <15 U/ML — SIGNIFICANT CHANGE UP

## 2025-03-26 ENCOUNTER — INPATIENT (INPATIENT)
Facility: HOSPITAL | Age: 55
LOS: 7 days | Discharge: HOME | End: 2025-04-03
Attending: HOSPITALIST | Admitting: HOSPITALIST
Payer: MEDICAID

## 2025-03-26 VITALS
OXYGEN SATURATION: 97 % | TEMPERATURE: 98 F | WEIGHT: 134.92 LBS | RESPIRATION RATE: 18 BRPM | HEIGHT: 67 IN | SYSTOLIC BLOOD PRESSURE: 144 MMHG | DIASTOLIC BLOOD PRESSURE: 75 MMHG | HEART RATE: 94 BPM

## 2025-03-26 DIAGNOSIS — Z98.84 BARIATRIC SURGERY STATUS: Chronic | ICD-10-CM

## 2025-03-26 LAB
A1C WITH ESTIMATED AVERAGE GLUCOSE RESULT: 16.7 % — HIGH (ref 4–5.6)
ALBUMIN SERPL ELPH-MCNC: 4.1 G/DL — SIGNIFICANT CHANGE UP (ref 3.3–5)
ALP SERPL-CCNC: 148 U/L — HIGH (ref 40–120)
ALT FLD-CCNC: 9 U/L — SIGNIFICANT CHANGE UP (ref 4–41)
ANION GAP SERPL CALC-SCNC: 23 MMOL/L — HIGH (ref 7–14)
APTT BLD: 32.6 SEC — SIGNIFICANT CHANGE UP (ref 24.5–35.6)
AST SERPL-CCNC: 10 U/L — SIGNIFICANT CHANGE UP (ref 4–40)
B-OH-BUTYR SERPL-SCNC: 5 MMOL/L — HIGH (ref 0–0.4)
BASOPHILS # BLD AUTO: 0.04 K/UL — SIGNIFICANT CHANGE UP (ref 0–0.2)
BASOPHILS NFR BLD AUTO: 0.3 % — SIGNIFICANT CHANGE UP (ref 0–2)
BILIRUB SERPL-MCNC: 0.3 MG/DL — SIGNIFICANT CHANGE UP (ref 0.2–1.2)
BUN SERPL-MCNC: 18 MG/DL — SIGNIFICANT CHANGE UP (ref 7–23)
CALCIUM SERPL-MCNC: 9.8 MG/DL — SIGNIFICANT CHANGE UP (ref 8.4–10.5)
CHLORIDE SERPL-SCNC: 86 MMOL/L — LOW (ref 98–107)
CO2 SERPL-SCNC: 15 MMOL/L — LOW (ref 22–31)
CREAT SERPL-MCNC: 0.89 MG/DL — SIGNIFICANT CHANGE UP (ref 0.5–1.3)
EGFR: 102 ML/MIN/1.73M2 — SIGNIFICANT CHANGE UP
EGFR: 102 ML/MIN/1.73M2 — SIGNIFICANT CHANGE UP
EOSINOPHIL # BLD AUTO: 0.02 K/UL — SIGNIFICANT CHANGE UP (ref 0–0.5)
EOSINOPHIL NFR BLD AUTO: 0.1 % — SIGNIFICANT CHANGE UP (ref 0–6)
ESTIMATED AVERAGE GLUCOSE: 433 — SIGNIFICANT CHANGE UP
GAS PNL BLDV: SIGNIFICANT CHANGE UP
GLUCOSE SERPL-MCNC: 720 MG/DL — CRITICAL HIGH (ref 70–99)
HCT VFR BLD CALC: 34.9 % — LOW (ref 39–50)
HGB BLD-MCNC: 11.1 G/DL — LOW (ref 13–17)
IANC: 12.32 K/UL — HIGH (ref 1.8–7.4)
IMM GRANULOCYTES NFR BLD AUTO: 0.4 % — SIGNIFICANT CHANGE UP (ref 0–0.9)
INR BLD: <0.9 RATIO — SIGNIFICANT CHANGE UP (ref 0.85–1.16)
LACTATE SERPL-SCNC: 1.3 MMOL/L — SIGNIFICANT CHANGE UP (ref 0.5–2)
LYMPHOCYTES # BLD AUTO: 0.79 K/UL — LOW (ref 1–3.3)
LYMPHOCYTES # BLD AUTO: 5.5 % — LOW (ref 13–44)
MCHC RBC-ENTMCNC: 27.5 PG — SIGNIFICANT CHANGE UP (ref 27–34)
MCHC RBC-ENTMCNC: 31.8 G/DL — LOW (ref 32–36)
MCV RBC AUTO: 86.4 FL — SIGNIFICANT CHANGE UP (ref 80–100)
MONOCYTES # BLD AUTO: 1.02 K/UL — HIGH (ref 0–0.9)
MONOCYTES NFR BLD AUTO: 7.2 % — SIGNIFICANT CHANGE UP (ref 2–14)
NEUTROPHILS # BLD AUTO: 12.32 K/UL — HIGH (ref 1.8–7.4)
NEUTROPHILS NFR BLD AUTO: 86.5 % — HIGH (ref 43–77)
NRBC # BLD AUTO: 0 K/UL — SIGNIFICANT CHANGE UP (ref 0–0)
NRBC # FLD: 0 K/UL — SIGNIFICANT CHANGE UP (ref 0–0)
NRBC BLD AUTO-RTO: 0 /100 WBCS — SIGNIFICANT CHANGE UP (ref 0–0)
PLATELET # BLD AUTO: 383 K/UL — SIGNIFICANT CHANGE UP (ref 150–400)
POTASSIUM SERPL-MCNC: 4.8 MMOL/L — SIGNIFICANT CHANGE UP (ref 3.5–5.3)
POTASSIUM SERPL-SCNC: 4.8 MMOL/L — SIGNIFICANT CHANGE UP (ref 3.5–5.3)
PROT SERPL-MCNC: 8.9 G/DL — HIGH (ref 6–8.3)
PROTHROM AB SERPL-ACNC: 10.5 SEC — SIGNIFICANT CHANGE UP (ref 9.9–13.4)
RBC # BLD: 4.04 M/UL — LOW (ref 4.2–5.8)
RBC # FLD: 12.9 % — SIGNIFICANT CHANGE UP (ref 10.3–14.5)
SODIUM SERPL-SCNC: 124 MMOL/L — LOW (ref 135–145)
WBC # BLD: 14.25 K/UL — HIGH (ref 3.8–10.5)
WBC # FLD AUTO: 14.25 K/UL — HIGH (ref 3.8–10.5)

## 2025-03-26 PROCEDURE — 71045 X-RAY EXAM CHEST 1 VIEW: CPT | Mod: 26

## 2025-03-26 PROCEDURE — 73630 X-RAY EXAM OF FOOT: CPT | Mod: 26,LT

## 2025-03-26 PROCEDURE — 99285 EMERGENCY DEPT VISIT HI MDM: CPT

## 2025-03-26 RX ORDER — SODIUM CHLORIDE 9 G/1000ML
1000 INJECTION, SOLUTION INTRAVENOUS ONCE
Refills: 0 | Status: COMPLETED | OUTPATIENT
Start: 2025-03-26 | End: 2025-03-26

## 2025-03-26 RX ORDER — PIPERACILLIN-TAZO-DEXTROSE,ISO 3.375G/5
3.38 IV SOLUTION, PIGGYBACK PREMIX FROZEN(ML) INTRAVENOUS ONCE
Refills: 0 | Status: COMPLETED | OUTPATIENT
Start: 2025-03-26 | End: 2025-03-26

## 2025-03-26 RX ORDER — INSULIN LISPRO 100 U/ML
12 INJECTION, SOLUTION INTRAVENOUS; SUBCUTANEOUS ONCE
Refills: 0 | Status: DISCONTINUED | OUTPATIENT
Start: 2025-03-26 | End: 2025-03-27

## 2025-03-26 RX ORDER — VANCOMYCIN HCL IN 5 % DEXTROSE 1.5G/250ML
1000 PLASTIC BAG, INJECTION (ML) INTRAVENOUS ONCE
Refills: 0 | Status: COMPLETED | OUTPATIENT
Start: 2025-03-26 | End: 2025-03-26

## 2025-03-26 RX ADMIN — Medication 200 GRAM(S): at 22:30

## 2025-03-26 RX ADMIN — Medication 250 MILLIGRAM(S): at 23:09

## 2025-03-26 RX ADMIN — Medication 1000 MILLILITER(S): at 23:09

## 2025-03-26 NOTE — ED PROVIDER NOTE - ATTENDING APP SHARED VISIT CONTRIBUTION OF CARE
Patient alert and oriented HEENT nml heart s1s2, lungs clear abd soft nontender, pulses intact to DP, left foot with dry gangrene, blackened  2nd toe, no drainage noted, glucose. high. Will assess for DKA, hydrate, antibiotics, xray, Podiatry consult

## 2025-03-26 NOTE — ED ADULT NURSE NOTE - NSFALLRISKFACTORS_ED_ALL_ED
No indicators present Mucosal Advancement Flap Text: Given the location of the defect, shape of the defect and the proximity to free margins a mucosal advancement flap was deemed most appropriate. Incisions were made with a 15 blade scalpel in the appropriate fashion along the cutaneous vermilion border and the mucosal lip. The remaining actinically damaged mucosal tissue was excised.  The mucosal advancement flap was then elevated to the gingival sulcus with care taken to preserve the neurovascular structures and advanced into the primary defect. Care was taken to ensure that precise realignment of the vermilion border was achieved.

## 2025-03-26 NOTE — ED ADULT NURSE NOTE - OBJECTIVE STATEMENT
Pt received to 17A. Pt is a 54 year old male w/ hx of DMII. Pt states he was sent to ED by Podiatrist for  L foot wound to 2nd toe. Pt states he has had been having numbness/tingling and swelling over the past week to L Foot. Pt A&Ox4. Respirations even and unlabored. abdomen is soft, nontender, nondistended. no edema noted. L foot dressing noted. L 2nd toe black in appearance. spontaneous movement of all extremities. Placed on cardiac monitor and continuous pulse oximetry. EKG in chart. VS as noted in flowsheet. R FA 20 IV placed, +blood return, flushes without difficulty. labs collected and sent. medications administered as ordered. awaiting imaging. comfort measures provided. stretcher set in lowest position, call bell within reach, safety maintained.

## 2025-03-26 NOTE — ED ADULT TRIAGE NOTE - TEMP AT ED ARRIVAL (C)
Chavez Reinoso RN  [facility]      36.7

## 2025-03-26 NOTE — ED PROVIDER NOTE - OBJECTIVE STATEMENT
Daily Note     Today's date: 2019  Patient name: Jose Luis  : 1942  MRN: [id number]  Referring provider: Be Pittman MD  Dx:   Encounter Diagnosis     ICD-10-CM    1  Acute pain of right knee M25 561    2  Iliotibial band syndrome of right side M76 31    3  Acute pain of left knee M25 562                   Subjective: Pt c/o leg cramps this morning  Soreness prior to treatment since just coming from grocery store  Objective: See treatment diary below  Precautions: L TKR, hypertension      Manual                                                                                   Exercise Diary             Recumbent bike nv 5 min           Hamstring strap stretch 30"x3 30"x3           ITB strap stretch 30"x3 30"x3           Piriformis stretch 30"x3 30"x3           Quad sets nv 5" x20           Hip adductor squeezes nv 5"x20           Hip abd w/ tband nv RTB 5"x20                                                                                                                                                                                        Modalities                                                                Assessment: Tolerated treatment well  Patient demonstrated fatigue post treatment, exhibited good technique with therapeutic exercises and would benefit from continued PT   No c/o increased pain throughout treatment  Pt reports compliance w/ HEP  Plan: Continue per plan of care  56 y/o M with PMH of DM2, gastric bypass, presents sent in by podiatrist Dr. Fernandez for progressively worsening left foot 2nd toe dry gangrene for admission. Patient has been following up with his podiatrist every 2 weeks for dry gangrene of his left foot 2nd toe but despite the continued care has been having progression of his infection. He also notes associated numbness and swelling into the foot. He was at the office earlier today and the doctor recommended he come to the ED for admission to receive IV abx and possible amputation of the toe. Denies any other complaints or concerns. Denies chest pain, SOB, cough, fevers, chills, abdominal pain, N/V/D/C, urinary complaints, HA, dizziness, tingling, weakness, trauma, injuries, falls, sick contacts, recent travel.

## 2025-03-26 NOTE — ED PROVIDER NOTE - PHYSICAL EXAMINATION
CONSTITUTIONAL: Comfortable; in no acute distress. Non-toxic appearing.   NEURO: Alert & oriented. Sensory and motor functions are grossly intact.  PSYCH: Mood appropriate. Thought processes intact.   HEAD: NCAT  MUSCULOSKELETAL/EXTREMITIES: (+) left foot edema by the ankle, (+) DP pulse, 2nd toe with necrotic appearing tissue over the distal portion; FROM in all four extremities.  SKIN: Warm; dry; no apparent lesions or exudate

## 2025-03-26 NOTE — ED ADULT NURSE NOTE - NSFALLUNIVINTERV_ED_ALL_ED
Bed/Stretcher in lowest position, wheels locked, appropriate side rails in place/Call bell, personal items and telephone in reach/Instruct patient to call for assistance before getting out of bed/chair/stretcher/Non-slip footwear applied when patient is off stretcher/[employer] to call system/Physically safe environment - no spills, clutter or unnecessary equipment/Purposeful proactive rounding/Room/bathroom lighting operational, light cord in reach

## 2025-03-26 NOTE — ED PROVIDER NOTE - CARE PLAN
Principal Discharge DX:	Dry gangrene   1 Principal Discharge DX:	Dry gangrene  Secondary Diagnosis:	Hyperglycemia

## 2025-03-26 NOTE — ED ADULT TRIAGE NOTE - CHIEF COMPLAINT QUOTE
sent in by PMD for left foot 2nd toe gangrene progressively worsening over the last week, Phx fo DM , pt reports last us of insulin today, ketosis like smell noted on breath,.

## 2025-03-26 NOTE — ED PROVIDER NOTE - CLINICAL SUMMARY MEDICAL DECISION MAKING FREE TEXT BOX
56 y/o M with PMH of DM2, gastric bypass, presents sent in by podiatrist Dr. Fernandez for progressively worsening left foot 2nd toe dry gangrene for admission. Will obtain blood work, chest xray, and left foot xray. Will treat with fluids and vanc and zosyn.  Labs show wbc of 14. Glucose of 720 with elevated anion gap, elevated beta hydroxy, concerning for mild dka although not significantly acidotic. Will treat with additional fluid and admelog 12 units. Will reassess. 54 y/o M with PMH of DM2, gastric bypass, presents sent in by podiatrist Dr. Fernandez for progressively worsening left foot 2nd toe dry gangrene for admission. Will obtain blood work, chest xray, and left foot xray. Will treat with fluids and vanc and zosyn.  Labs show wbc of 14. Glucose of 720 with elevated anion gap, elevated beta hydroxy, concerning for mild dka although not significantly acidotic. Will treat with additional fluid and admelog 10 units. Will reassess.  Patients repeat blood work was improved, no longer showing signs of dka.  Discussed case with hospitalist who accepted the patient for admission of dry gangrene for possible amputation with uncontrolled glucose levels. Will order a dose of the patients home lantus. Podiatry following.

## 2025-03-27 ENCOUNTER — RESULT REVIEW (OUTPATIENT)
Age: 55
End: 2025-03-27

## 2025-03-27 DIAGNOSIS — E78.5 HYPERLIPIDEMIA, UNSPECIFIED: ICD-10-CM

## 2025-03-27 DIAGNOSIS — E11.10 TYPE 2 DIABETES MELLITUS WITH KETOACIDOSIS WITHOUT COMA: ICD-10-CM

## 2025-03-27 DIAGNOSIS — I96 GANGRENE, NOT ELSEWHERE CLASSIFIED: ICD-10-CM

## 2025-03-27 DIAGNOSIS — Z98.84 BARIATRIC SURGERY STATUS: ICD-10-CM

## 2025-03-27 DIAGNOSIS — Z29.9 ENCOUNTER FOR PROPHYLACTIC MEASURES, UNSPECIFIED: ICD-10-CM

## 2025-03-27 DIAGNOSIS — E11.9 TYPE 2 DIABETES MELLITUS WITHOUT COMPLICATIONS: ICD-10-CM

## 2025-03-27 LAB
ALBUMIN SERPL ELPH-MCNC: 3.1 G/DL — LOW (ref 3.3–5)
ALBUMIN SERPL ELPH-MCNC: 3.2 G/DL — LOW (ref 3.3–5)
ALP SERPL-CCNC: 103 U/L — SIGNIFICANT CHANGE UP (ref 40–120)
ALP SERPL-CCNC: 98 U/L — SIGNIFICANT CHANGE UP (ref 40–120)
ALT FLD-CCNC: 5 U/L — SIGNIFICANT CHANGE UP (ref 4–41)
ALT FLD-CCNC: 5 U/L — SIGNIFICANT CHANGE UP (ref 4–41)
ANION GAP SERPL CALC-SCNC: 15 MMOL/L — HIGH (ref 7–14)
ANION GAP SERPL CALC-SCNC: 15 MMOL/L — HIGH (ref 7–14)
ANION GAP SERPL CALC-SCNC: 17 MMOL/L — HIGH (ref 7–14)
APPEARANCE UR: CLEAR — SIGNIFICANT CHANGE UP
AST SERPL-CCNC: 12 U/L — SIGNIFICANT CHANGE UP (ref 4–40)
AST SERPL-CCNC: 9 U/L — SIGNIFICANT CHANGE UP (ref 4–40)
B-OH-BUTYR SERPL-SCNC: 2.1 MMOL/L — HIGH (ref 0–0.4)
B-OH-BUTYR SERPL-SCNC: 2.4 MMOL/L — HIGH (ref 0–0.4)
B-OH-BUTYR SERPL-SCNC: 2.8 MMOL/L — HIGH (ref 0–0.4)
BILIRUB SERPL-MCNC: 0.2 MG/DL — SIGNIFICANT CHANGE UP (ref 0.2–1.2)
BILIRUB SERPL-MCNC: <0.2 MG/DL — SIGNIFICANT CHANGE UP (ref 0.2–1.2)
BILIRUB UR-MCNC: NEGATIVE — SIGNIFICANT CHANGE UP
BLOOD GAS VENOUS COMPREHENSIVE RESULT: SIGNIFICANT CHANGE UP
BLOOD GAS VENOUS COMPREHENSIVE RESULT: SIGNIFICANT CHANGE UP
BUN SERPL-MCNC: 10 MG/DL — SIGNIFICANT CHANGE UP (ref 7–23)
BUN SERPL-MCNC: 14 MG/DL — SIGNIFICANT CHANGE UP (ref 7–23)
BUN SERPL-MCNC: 8 MG/DL — SIGNIFICANT CHANGE UP (ref 7–23)
CALCIUM SERPL-MCNC: 9 MG/DL — SIGNIFICANT CHANGE UP (ref 8.4–10.5)
CALCIUM SERPL-MCNC: 9.1 MG/DL — SIGNIFICANT CHANGE UP (ref 8.4–10.5)
CALCIUM SERPL-MCNC: 9.1 MG/DL — SIGNIFICANT CHANGE UP (ref 8.4–10.5)
CHLORIDE SERPL-SCNC: 96 MMOL/L — LOW (ref 98–107)
CHLORIDE SERPL-SCNC: 97 MMOL/L — LOW (ref 98–107)
CHLORIDE SERPL-SCNC: 99 MMOL/L — SIGNIFICANT CHANGE UP (ref 98–107)
CO2 SERPL-SCNC: 19 MMOL/L — LOW (ref 22–31)
CO2 SERPL-SCNC: 21 MMOL/L — LOW (ref 22–31)
CO2 SERPL-SCNC: 22 MMOL/L — SIGNIFICANT CHANGE UP (ref 22–31)
COLOR SPEC: YELLOW — SIGNIFICANT CHANGE UP
CREAT SERPL-MCNC: 0.64 MG/DL — SIGNIFICANT CHANGE UP (ref 0.5–1.3)
CREAT SERPL-MCNC: 0.65 MG/DL — SIGNIFICANT CHANGE UP (ref 0.5–1.3)
CREAT SERPL-MCNC: 0.77 MG/DL — SIGNIFICANT CHANGE UP (ref 0.5–1.3)
CRP SERPL-MCNC: 118 MG/L — HIGH
DIFF PNL FLD: NEGATIVE — SIGNIFICANT CHANGE UP
EGFR: 107 ML/MIN/1.73M2 — SIGNIFICANT CHANGE UP
EGFR: 107 ML/MIN/1.73M2 — SIGNIFICANT CHANGE UP
EGFR: 113 ML/MIN/1.73M2 — SIGNIFICANT CHANGE UP
ERYTHROCYTE [SEDIMENTATION RATE] IN BLOOD: 81 MM/HR — HIGH (ref 1–15)
GAS PNL BLDV: SIGNIFICANT CHANGE UP
GLUCOSE BLDC GLUCOMTR-MCNC: 203 MG/DL — HIGH (ref 70–99)
GLUCOSE BLDC GLUCOMTR-MCNC: 204 MG/DL — HIGH (ref 70–99)
GLUCOSE BLDC GLUCOMTR-MCNC: 227 MG/DL — HIGH (ref 70–99)
GLUCOSE BLDC GLUCOMTR-MCNC: 265 MG/DL — HIGH (ref 70–99)
GLUCOSE BLDC GLUCOMTR-MCNC: 269 MG/DL — HIGH (ref 70–99)
GLUCOSE SERPL-MCNC: 262 MG/DL — HIGH (ref 70–99)
GLUCOSE SERPL-MCNC: 262 MG/DL — HIGH (ref 70–99)
GLUCOSE SERPL-MCNC: 373 MG/DL — HIGH (ref 70–99)
GLUCOSE UR QL: >=1000 MG/DL
KETONES UR-MCNC: 40 MG/DL
LEUKOCYTE ESTERASE UR-ACNC: NEGATIVE — SIGNIFICANT CHANGE UP
MAGNESIUM SERPL-MCNC: 1.7 MG/DL — SIGNIFICANT CHANGE UP (ref 1.6–2.6)
MAGNESIUM SERPL-MCNC: 1.9 MG/DL — SIGNIFICANT CHANGE UP (ref 1.6–2.6)
MAGNESIUM SERPL-MCNC: 1.9 MG/DL — SIGNIFICANT CHANGE UP (ref 1.6–2.6)
MRSA PCR RESULT.: SIGNIFICANT CHANGE UP
NITRITE UR-MCNC: NEGATIVE — SIGNIFICANT CHANGE UP
PH UR: 5.5 — SIGNIFICANT CHANGE UP (ref 5–8)
PHOSPHATE SERPL-MCNC: 2.6 MG/DL — SIGNIFICANT CHANGE UP (ref 2.5–4.5)
PHOSPHATE SERPL-MCNC: 2.6 MG/DL — SIGNIFICANT CHANGE UP (ref 2.5–4.5)
PHOSPHATE SERPL-MCNC: 2.7 MG/DL — SIGNIFICANT CHANGE UP (ref 2.5–4.5)
POTASSIUM SERPL-MCNC: 3.9 MMOL/L — SIGNIFICANT CHANGE UP (ref 3.5–5.3)
POTASSIUM SERPL-MCNC: 4 MMOL/L — SIGNIFICANT CHANGE UP (ref 3.5–5.3)
POTASSIUM SERPL-MCNC: 4.2 MMOL/L — SIGNIFICANT CHANGE UP (ref 3.5–5.3)
POTASSIUM SERPL-SCNC: 3.9 MMOL/L — SIGNIFICANT CHANGE UP (ref 3.5–5.3)
POTASSIUM SERPL-SCNC: 4 MMOL/L — SIGNIFICANT CHANGE UP (ref 3.5–5.3)
POTASSIUM SERPL-SCNC: 4.2 MMOL/L — SIGNIFICANT CHANGE UP (ref 3.5–5.3)
PROT SERPL-MCNC: 6.7 G/DL — SIGNIFICANT CHANGE UP (ref 6–8.3)
PROT SERPL-MCNC: 6.8 G/DL — SIGNIFICANT CHANGE UP (ref 6–8.3)
PROT UR-MCNC: NEGATIVE MG/DL — SIGNIFICANT CHANGE UP
S AUREUS DNA NOSE QL NAA+PROBE: DETECTED
SODIUM SERPL-SCNC: 132 MMOL/L — LOW (ref 135–145)
SODIUM SERPL-SCNC: 134 MMOL/L — LOW (ref 135–145)
SODIUM SERPL-SCNC: 135 MMOL/L — SIGNIFICANT CHANGE UP (ref 135–145)
SP GR SPEC: 1.03 — SIGNIFICANT CHANGE UP (ref 1–1.03)
UROBILINOGEN FLD QL: 0.2 MG/DL — SIGNIFICANT CHANGE UP (ref 0.2–1)

## 2025-03-27 PROCEDURE — 99222 1ST HOSP IP/OBS MODERATE 55: CPT

## 2025-03-27 PROCEDURE — 99223 1ST HOSP IP/OBS HIGH 75: CPT

## 2025-03-27 PROCEDURE — 99223 1ST HOSP IP/OBS HIGH 75: CPT | Mod: GC

## 2025-03-27 RX ORDER — INSULIN LISPRO 100 U/ML
10 INJECTION, SOLUTION INTRAVENOUS; SUBCUTANEOUS ONCE
Refills: 0 | Status: COMPLETED | OUTPATIENT
Start: 2025-03-27 | End: 2025-03-27

## 2025-03-27 RX ORDER — DEXTROSE 50 % IN WATER 50 %
25 SYRINGE (ML) INTRAVENOUS ONCE
Refills: 0 | Status: DISCONTINUED | OUTPATIENT
Start: 2025-03-27 | End: 2025-04-03

## 2025-03-27 RX ORDER — INSULIN LISPRO 100 U/ML
6 INJECTION, SOLUTION INTRAVENOUS; SUBCUTANEOUS
Refills: 0 | Status: DISCONTINUED | OUTPATIENT
Start: 2025-03-27 | End: 2025-03-29

## 2025-03-27 RX ORDER — PIPERACILLIN-TAZO-DEXTROSE,ISO 3.375G/5
3.38 IV SOLUTION, PIGGYBACK PREMIX FROZEN(ML) INTRAVENOUS ONCE
Refills: 0 | Status: COMPLETED | OUTPATIENT
Start: 2025-03-27 | End: 2025-03-27

## 2025-03-27 RX ORDER — INSULIN LISPRO 100 U/ML
12 INJECTION, SOLUTION INTRAVENOUS; SUBCUTANEOUS ONCE
Refills: 0 | Status: DISCONTINUED | OUTPATIENT
Start: 2025-03-27 | End: 2025-03-27

## 2025-03-27 RX ORDER — GLUCAGON 3 MG/1
1 POWDER NASAL ONCE
Refills: 0 | Status: DISCONTINUED | OUTPATIENT
Start: 2025-03-27 | End: 2025-04-03

## 2025-03-27 RX ORDER — INSULIN LISPRO 100 U/ML
INJECTION, SOLUTION INTRAVENOUS; SUBCUTANEOUS
Refills: 0 | Status: DISCONTINUED | OUTPATIENT
Start: 2025-03-27 | End: 2025-03-29

## 2025-03-27 RX ORDER — ACETAMINOPHEN 500 MG/5ML
650 LIQUID (ML) ORAL EVERY 6 HOURS
Refills: 0 | Status: DISCONTINUED | OUTPATIENT
Start: 2025-03-27 | End: 2025-04-03

## 2025-03-27 RX ORDER — PIPERACILLIN-TAZO-DEXTROSE,ISO 3.375G/5
3.38 IV SOLUTION, PIGGYBACK PREMIX FROZEN(ML) INTRAVENOUS EVERY 8 HOURS
Refills: 0 | Status: DISCONTINUED | OUTPATIENT
Start: 2025-03-27 | End: 2025-03-29

## 2025-03-27 RX ORDER — INSULIN GLARGINE-YFGN 100 [IU]/ML
12 INJECTION, SOLUTION SUBCUTANEOUS ONCE
Refills: 0 | Status: COMPLETED | OUTPATIENT
Start: 2025-03-27 | End: 2025-03-27

## 2025-03-27 RX ORDER — ONDANSETRON HCL/PF 4 MG/2 ML
4 VIAL (ML) INJECTION EVERY 8 HOURS
Refills: 0 | Status: DISCONTINUED | OUTPATIENT
Start: 2025-03-27 | End: 2025-03-27

## 2025-03-27 RX ORDER — MELATONIN 5 MG
6 TABLET ORAL AT BEDTIME
Refills: 0 | Status: DISCONTINUED | OUTPATIENT
Start: 2025-03-27 | End: 2025-04-03

## 2025-03-27 RX ORDER — DEXTROSE 50 % IN WATER 50 %
12.5 SYRINGE (ML) INTRAVENOUS ONCE
Refills: 0 | Status: DISCONTINUED | OUTPATIENT
Start: 2025-03-27 | End: 2025-04-03

## 2025-03-27 RX ORDER — DEXTROSE 50 % IN WATER 50 %
15 SYRINGE (ML) INTRAVENOUS ONCE
Refills: 0 | Status: DISCONTINUED | OUTPATIENT
Start: 2025-03-27 | End: 2025-04-03

## 2025-03-27 RX ORDER — SODIUM CHLORIDE 9 G/1000ML
1000 INJECTION, SOLUTION INTRAVENOUS
Refills: 0 | Status: DISCONTINUED | OUTPATIENT
Start: 2025-03-27 | End: 2025-04-03

## 2025-03-27 RX ORDER — INSULIN LISPRO 100 U/ML
INJECTION, SOLUTION INTRAVENOUS; SUBCUTANEOUS AT BEDTIME
Refills: 0 | Status: DISCONTINUED | OUTPATIENT
Start: 2025-03-27 | End: 2025-03-29

## 2025-03-27 RX ORDER — INSULIN LISPRO 100 U/ML
INJECTION, SOLUTION INTRAVENOUS; SUBCUTANEOUS
Refills: 0 | Status: DISCONTINUED | OUTPATIENT
Start: 2025-03-27 | End: 2025-03-27

## 2025-03-27 RX ORDER — INSULIN GLARGINE-YFGN 100 [IU]/ML
12 INJECTION, SOLUTION SUBCUTANEOUS
Refills: 0 | Status: DISCONTINUED | OUTPATIENT
Start: 2025-03-28 | End: 2025-03-28

## 2025-03-27 RX ORDER — MAGNESIUM, ALUMINUM HYDROXIDE 200-200 MG
30 TABLET,CHEWABLE ORAL EVERY 4 HOURS
Refills: 0 | Status: DISCONTINUED | OUTPATIENT
Start: 2025-03-27 | End: 2025-04-03

## 2025-03-27 RX ORDER — INSULIN LISPRO 100 U/ML
INJECTION, SOLUTION INTRAVENOUS; SUBCUTANEOUS EVERY 4 HOURS
Refills: 0 | Status: DISCONTINUED | OUTPATIENT
Start: 2025-03-27 | End: 2025-03-27

## 2025-03-27 RX ORDER — VANCOMYCIN HCL IN 5 % DEXTROSE 1.5G/250ML
1250 PLASTIC BAG, INJECTION (ML) INTRAVENOUS EVERY 12 HOURS
Refills: 0 | Status: DISCONTINUED | OUTPATIENT
Start: 2025-03-27 | End: 2025-03-28

## 2025-03-27 RX ADMIN — SODIUM CHLORIDE 1000 MILLILITER(S): 9 INJECTION, SOLUTION INTRAVENOUS at 01:02

## 2025-03-27 RX ADMIN — Medication 25 GRAM(S): at 13:25

## 2025-03-27 RX ADMIN — INSULIN LISPRO 4: 100 INJECTION, SOLUTION INTRAVENOUS; SUBCUTANEOUS at 17:30

## 2025-03-27 RX ADMIN — INSULIN GLARGINE-YFGN 12 UNIT(S): 100 INJECTION, SOLUTION SUBCUTANEOUS at 06:11

## 2025-03-27 RX ADMIN — Medication 25 GRAM(S): at 21:24

## 2025-03-27 RX ADMIN — INSULIN LISPRO 6: 100 INJECTION, SOLUTION INTRAVENOUS; SUBCUTANEOUS at 10:50

## 2025-03-27 RX ADMIN — INSULIN LISPRO 4: 100 INJECTION, SOLUTION INTRAVENOUS; SUBCUTANEOUS at 14:34

## 2025-03-27 RX ADMIN — Medication 1 APPLICATION(S): at 11:04

## 2025-03-27 RX ADMIN — Medication 200 GRAM(S): at 10:49

## 2025-03-27 RX ADMIN — Medication 166.67 MILLIGRAM(S): at 17:30

## 2025-03-27 RX ADMIN — INSULIN LISPRO 10 UNIT(S): 100 INJECTION, SOLUTION INTRAVENOUS; SUBCUTANEOUS at 00:52

## 2025-03-27 NOTE — DISCHARGE NOTE PROVIDER - HOSPITAL COURSE
Discharge Summary     Admission diagnoses:   gangrene 2/2 diabetes    Discharge diagnoses:   ***    56 y/o M with PMH of DM2, gastric bypass, sent to the ED by podiatrist Dr. Fernandez for progressively worsening left foot 2nd toe dry gangrene associated with ankle swelling. Pt has been seeing a podiatrist for diabetic lower extremity complications since December 2024, q2w. Two weeks ago at his previous podiatry appt, the left 2nd toe was fine with just a small scab. Pt reports the scab arose out of nowhere, was not 2/2 to any known trauma like tripping or a bug bite. The scab worsened and the toe progressively turned dark purple/near black in color. Two days ago, the ankle became swollen. When he went to the podiatrist yesterday (3/26), Dr. Fernandez sent him to the ED immediately. Pt denies numbness, weakness, pain, recent falls, tripping, feeling unsteady. At baseline, pt ambulates normally, can walk several blocks. Pt denies pain on walking or leg swelling at the end of the day. Pt denies chest pain, SOB, cough, fevers, chills, abdominal pain, n/v, urinary complaints, HA, dizziness, tingling, recent injuries, sick contacts, recent travel.    Pt was diagnosed with T2DM 20 years ago. He took metformin, but was hesitant to take insulin because he thought it would cause foot complications. He began having ulcers/poorly healing wounds on his lower legs and feet 2 years ago. In May 2024, he had a gastric bypass to reduce his blood sugar, and was prescribed insulin (lantus 12mg qhs, his current dose) but was not fully adherent until about one month ago. He says his blood sugars are typically around 250-280. He follows with endocrinologist Dr. Murillo, last appt 8 months ago.    Hospital course: During admission, patient was seen by podiatry to evaluate for surgical intervention. He was also seen by vascular surgery to clear for surgical intervention. INDY/PVR was performed and was negative for significant vascular occlusion and patient was cleared for surgical intervention by vascular.     On day of discharge, patient is clinically stable with no new exam findings or acute symptoms compared to prior. The patient was seen by the attending physician on the date of discharge and deemed stable and acceptable for discharge. The patient's chronic medical conditions were treated accordingly per the patient's home medication regimen. The patient's medication reconciliation, follow up appointments, discharge instructions, and significant lab and diagnostic studies are as noted.     Discharge follow up action items:     1. Follow up with PCP  2. Follow up labs, path, & imaging ***  3. Medication changes ***    Patient's ordered code status: ***    Patient disposition: ***     Discharge Summary     Admission diagnoses:   gangrene 2/2 diabetes    Discharge diagnoses:   ***    56 y/o M with PMH of DM2, gastric bypass, sent to the ED by podiatrist Dr. Fernandez for progressively worsening left foot 2nd toe dry gangrene associated with ankle swelling. Pt has been seeing a podiatrist for diabetic lower extremity complications since December 2024, q2w. Two weeks ago at his previous podiatry appt, the left 2nd toe was fine with just a small scab. Pt reports the scab arose out of nowhere, was not 2/2 to any known trauma like tripping or a bug bite. The scab worsened and the toe progressively turned dark purple/near black in color. Two days ago, the ankle became swollen. When he went to the podiatrist yesterday (3/26), Dr. Fernandez sent him to the ED immediately. Pt denies numbness, weakness, pain, recent falls, tripping, feeling unsteady. At baseline, pt ambulates normally, can walk several blocks. Pt denies pain on walking or leg swelling at the end of the day. Pt denies chest pain, SOB, cough, fevers, chills, abdominal pain, n/v, urinary complaints, HA, dizziness, tingling, recent injuries, sick contacts, recent travel.    Pt was diagnosed with T2DM 20 years ago. He took metformin, but was hesitant to take insulin because he thought it would cause foot complications. He began having ulcers/poorly healing wounds on his lower legs and feet 2 years ago. In May 2024, he had a gastric bypass to reduce his blood sugar, and was prescribed insulin (lantus 12mg qhs, his current dose) but was not fully adherent until about one month ago. He says his blood sugars are typically around 250-280. He follows with endocrinologist Dr. Murillo, last appt 8 months ago.    Hospital course: During admission, patient was seen by podiatry to evaluate for surgical intervention. He was also seen by vascular surgery to clear for surgical intervention. INDY/PVR was performed and was negative for significant vascular occlusion and patient was cleared for surgical intervention by vascular. The patient was seen by endocrinology for adjustments to his inpatient insulin regimen. On admission with mild DKA, was given 10u admelog at admission with improvement in his BHB, then started on insulin regimen per Endo. He was monitored with repeat BHB level until resolution, also provided IVF. Left foot partial 2nd ray resection was scheduled for 4/3 and performed with Dr Ferro.    On day of discharge, patient is clinically stable with no new exam findings or acute symptoms compared to prior. The patient was seen by the attending physician on the date of discharge and deemed stable and acceptable for discharge. The patient's chronic medical conditions were treated accordingly per the patient's home medication regimen. The patient's medication reconciliation, follow up appointments, discharge instructions, and significant lab and diagnostic studies are as noted.     Discharge follow up action items:     1. Follow up with PCP  2. Follow up labs, path, & imaging ***  3. Medication changes ***    Patient's ordered code status: ***    Patient disposition: ***     Discharge Summary     Admission diagnoses:   gangrene 2/2 diabetes    Discharge diagnoses:   L 2nd toe gangrene    54 y/o M with PMH of DM2, gastric bypass, sent to the ED by podiatrist Dr. Fernandez for progressively worsening left foot 2nd toe dry gangrene associated with ankle swelling. Pt has been seeing a podiatrist for diabetic lower extremity complications since December 2024, q2w. Two weeks ago at his previous podiatry appt, the left 2nd toe was fine with just a small scab. Pt reports the scab arose out of nowhere, was not 2/2 to any known trauma like tripping or a bug bite. The scab worsened and the toe progressively turned dark purple/near black in color. Two days ago, the ankle became swollen. When he went to the podiatrist yesterday (3/26), Dr. Fernandez sent him to the ED immediately. Pt denies numbness, weakness, pain, recent falls, tripping, feeling unsteady. At baseline, pt ambulates normally, can walk several blocks. Pt denies pain on walking or leg swelling at the end of the day. Pt denies chest pain, SOB, cough, fevers, chills, abdominal pain, n/v, urinary complaints, HA, dizziness, tingling, recent injuries, sick contacts, recent travel.    Pt was diagnosed with T2DM 20 years ago. He took metformin, but was hesitant to take insulin because he thought it would cause foot complications. He began having ulcers/poorly healing wounds on his lower legs and feet 2 years ago. In May 2024, he had a gastric bypass to reduce his blood sugar, and was prescribed insulin (lantus 12mg qhs, his current dose) but was not fully adherent until about one month ago. He says his blood sugars are typically around 250-280. He follows with endocrinologist Dr. Murillo, last appt 8 months ago.    Hospital course: During admission, patient was seen by podiatry to evaluate for surgical intervention. He was also seen by vascular surgery to clear for surgical intervention. INDY/PVR was performed and was negative for significant vascular occlusion and patient was cleared for surgical intervention by vascular. The patient was seen by endocrinology for adjustments to his inpatient insulin regimen. On admission with mild DKA, was given 10u admelog at admission with improvement in his BHB, then started on insulin regimen per Endo. He was monitored with repeat BHB level until resolution, also provided IVF. Left foot partial 2nd ray resection was scheduled for 4/2. The patient underwent L foot Partial Second Ray Resection, closed and was found to have Inadequate Intra-op bleeding, with high concern for viability and low concern for residual infection. Vascular re-evaluated the patient, and they recommended no acute vascular surgery interventions, continue local wound care, medical management. The patient was cleared for discharge per Vascular and Podiatry. The patient will follow up with PCP, Podiatry, and Endocrinology.    On day of discharge, patient is clinically stable with no new exam findings or acute symptoms compared to prior. The patient was seen by the attending physician on the date of discharge and deemed stable and acceptable for discharge. The patient's chronic medical conditions were treated accordingly per the patient's home medication regimen. The patient's medication reconciliation, follow up appointments, discharge instructions, and significant lab and diagnostic studies are as noted.     Medication changes  Lantus 30 in Morning  Admelog 10 units premeal TID    Discharge follow up action items:     1. Follow up with PCP (appointment 4/9 at 1:30 PM)  2. F/u Podiatry in 1 week regarding post-procedure care  3: F/u Endocrinology     Patient's ordered code status: Full code

## 2025-03-27 NOTE — DISCHARGE NOTE PROVIDER - NSDCCPCAREPLAN_GEN_ALL_CORE_FT
PRINCIPAL DISCHARGE DIAGNOSIS  Diagnosis: Dry gangrene  Assessment and Plan of Treatment: You were sent to the ED by podiatrist Dr. Fernandez for progressively worsening left foot 2nd toe dry gangrene. You were evaluated by Podiatry, who was planning for surgery. You were evaluated by the Vascular team, who indicated that you were cleared for surgery from their standpoint. You underwent partial aputation of the Second Toe of your Left Foot on 4/2/25. The Podiatry team re-evaluated you after the surgery, and they indicated that you were ready for discharge. The Podiatry team also indicated that you are ready for discharge. You will undergo outpatient Physical Therapy. Please make an appointment to follow up with Podiatry in 1 week. Please go to your Primary Care Appointment on 4/9 at 1:30 PM. If you experience worsening condition of your left foot, please visit the nearest Emergency Department.      SECONDARY DISCHARGE DIAGNOSES  Diagnosis: DM (diabetes mellitus)  Assessment and Plan of Treatment: You came to the hospital with very high elevations of your sugars. The Endocrinology team was following, and they indicated that you were in mild Diabetic Ketoacidosis. The Endocrinology team made adjustments to your Insulin Regimen daily, and they indicated that you should be sent home on a regimen of Morning 30 Insulin and Premeal 10 Insulin. An outpatient Endocrinology appointment was scheduled for you at [address] [place] [address] on 9/2/25 with Endocrinology. However, please call the number provided to schedule an earlier appointent if possible. Please also discuss your Insulin regimen with your Primary Care Provider at your 4/9 appointment. If you experience very high elevations in your Sugars, please discuss with your PCP and visit then nearest Emergency Department.

## 2025-03-27 NOTE — H&P ADULT - NSHPPHYSICALEXAM_GEN_ALL_CORE
GENERAL: NAD, lying in bed comfortably, conversational  HEAD:  Atraumatic, normocephalic  EYES: EOMI, PERRLA, conjunctiva and sclera clear  NECK: Supple, trachea midline, no JVD  HEART: +S1/S2, RRR, no murmurs, rubs, or gallops  LUNGS: Unlabored respirations. CTA b/l, no crackles, wheezing, or rhonchi  ABDOMEN: Soft, NTND, +BS. No masses or hernia palpated  EXTREMITIES: DP/PT 0/4 b/l, Both lower legs are warm, right foot is warm; left foot is cool. Left foot 2nd digit wrapped. Total of ~10 shallow nonbleeding  ulcers on both lower legs/feet. Right big toenail missing.  MSK: no gross deformity in extremities, no step off or deformity in C/T/L spine, normal muscle strength/tone  NERVOUS SYSTEM: CN II-XII intact, A&Ox3, moving all extremities spontaneously, no focal deficits, sensation intact and equal in b/l extremities (proprioception testing intact in big toes bilaterally).   SKIN: No rashes or lesions GENERAL: NAD, lying in bed comfortably, conversational  HEAD:  Atraumatic, normocephalic  EYES: EOMI, PERRLA, conjunctiva and sclera clear  NECK: Supple, trachea midline, no JVD  HEART: +S1/S2, RRR, no murmurs, rubs, or gallops  LUNGS: Unlabored respirations. CTA b/l, no crackles, wheezing, or rhonchi  ABDOMEN: Soft, NTND, +BS. No masses or hernia palpated  EXTREMITIES: DP/PT 0/4 b/l, Both lower legs are warm, right foot is warm; left foot is cool. Left foot 2nd digit wrapped. Total of ~10 shallow nonbleeding ulcers on both lower legs/feet. Right big toenail missing.  MSK: no gross deformity in extremities, no step off or deformity in C/T/L spine, normal muscle strength/tone  NERVOUS SYSTEM: CN II-XII intact, A&Ox3, moving all extremities spontaneously, no focal deficits, sensation intact and equal in b/l extremities (proprioception testing intact in big toes bilaterally).   SKIN: No rashes or lesions Vital Signs Last 24 Hrs  T(C): 36.9 (27 Mar 2025 06:55), Max: 37.1 (27 Mar 2025 01:03)  T(F): 98.4 (27 Mar 2025 06:55), Max: 98.7 (27 Mar 2025 01:03)  HR: 81 (27 Mar 2025 06:55) (81 - 96)  BP: 117/70 (27 Mar 2025 06:55) (117/70 - 166/83)  BP(mean): --  RR: 16 (27 Mar 2025 06:55) (15 - 18)  SpO2: 98% (27 Mar 2025 06:55) (97% - 100%)    Parameters below as of 27 Mar 2025 06:55  Patient On (Oxygen Delivery Method): room air      GENERAL: NAD, lying in bed comfortably, conversational  HEAD:  Atraumatic, normocephalic  EYES: EOMI, PERRLA, conjunctiva and sclera clear  NECK: Supple, trachea midline, no JVD  HEART: +S1/S2, RRR, no murmurs, rubs, or gallops  LUNGS: Unlabored respirations. CTA b/l, no crackles, wheezing, or rhonchi  ABDOMEN: Soft, NTND, +BS. No masses or hernia palpated  EXTREMITIES: DP/PT 0/4 b/l, Both lower legs are warm, right foot is warm; left foot is cool. Left foot 2nd digit wrapped. Total of ~10 shallow nonbleeding ulcers on both lower legs/feet. Right big toenail missing.  MSK: no gross deformity in extremities, no step off or deformity in C/T/L spine, normal muscle strength/tone  NERVOUS SYSTEM: CN II-XII intact, A&Ox3, moving all extremities spontaneously, no focal deficits, sensation intact and equal in b/l extremities (proprioception testing intact in big toes bilaterally).   SKIN: No rashes or lesions

## 2025-03-27 NOTE — H&P ADULT - NSHPREVIEWOFSYSTEMS_GEN_ALL_CORE

## 2025-03-27 NOTE — PATIENT PROFILE ADULT - FALL HARM RISK - HARM RISK INTERVENTIONS

## 2025-03-27 NOTE — H&P ADULT - HISTORY OF PRESENT ILLNESS
56 y/o M with PMH of DM2, gastric bypass, presents sent in by podiatrist Dr. Fernandez for progressively worsening left foot 2nd toe dry gangrene for admission. Patient has been following up with his podiatrist every 2 weeks for dry gangrene of his left foot 2nd toe but despite the continued care has been having progression of his infection. He also notes associated numbness and swelling into the foot. He was at the office earlier today and the doctor recommended he come to the ED for admission to receive IV abx and possible amputation of the toe. Denies any other complaints or concerns. Denies chest pain, SOB, cough, fevers, chills, abdominal pain, N/V/D/C, urinary complaints, HA, dizziness, tingling, weakness, trauma, injuries, falls, sick contacts, recent travel. 56 y/o M with PMH of DM2 (home med lantus 12 units qhs), gastric bypass, sent in by podiatrist Dr. Fernandez for progressively worsening left foot 2nd toe dry gangrene for admission. Pt has been following up with this podiatrist since December 2024, q2 weeks, but infection has progressed despite continued care. He was at the podiatrist office earlier on 3/26 and the doctor recommended he come to the ED for IV abx and possible amputation of the toe. Denies numbness, weakness, pain, recent falls, recent trauma. Denies chest pain, SOB, cough, fevers, chills, abdominal pain, n/v, urinary complains, HA, dizziness, tingling, recent injuries, sick contacts, recent travel.   56 y/o M with PMH of DM2, gastric bypass, sent to the ED by podiatrist Dr. Fernandez for progressively worsening left foot 2nd toe dry gangrene associated with ankle swelling. Pt has been seeing a podiatrist for diabetic lower extremity complications since December 2024, q2w. Two weeks ago at his previous podiatry appt, the left 2nd toe was fine with just a small scab. Pt reports the scab arose out of nowhere, was not 2/2 to any known trauma like tripping or a bug bite. The scab worsened and the toe progressively turned dark purple/near black in color. Two days ago, the ankle became swollen. When he went to the podiatrist yesterday (3/26), Dr. Fernandez sent him to the ED immediately. Pt denies numbness, weakness, pain, recent falls, tripping, feeling unsteady. At baseline, pt ambulates normally, can walk several blocks. Pt denies pain on walking or leg swelling at the end of the day. Pt denies chest pain, SOB, cough, fevers, chills, abdominal pain, n/v, urinary complaints, HA, dizziness, tingling, recent injuries, sick contacts, recent travel.    Pt was diagnosed with T2DM 20 years ago. He took metformin, but was hesitant to take insulin because he thought it would cause foot complications. He began having ulcers/poorly healing wounds on his lower legs and feet 2 years ago. In May 2024, he had a gastric bypass to reduce his blood sugar, and was prescribed insulin (lantus 12mg qhs, his current dose) but was not fully adherent until about one month ago. He says his blood sugars are typically around 250-280. He follows with endocrinologist Dr. Murillo, last appt 8 months ago.

## 2025-03-27 NOTE — ED ADULT NURSE REASSESSMENT NOTE - NS ED NURSE REASSESS COMMENT FT1
BREAK RN: Pt resting on stretcher. Alert and oriented x4, able to move all extremities and follow commands. On tele, NSR. NAD. Breathing is even and unlabored on room air. Pending dispo.
Pt mental status remains at baseline. VS stable. No respiratory distress noted. Respirations even and unlabored. Pt denies any sob, chest pain, headache/dizziness. Pt w/ no complaints at this time. Medications administered as ordered. Pt safety precautions maintained. Pt care ongoing.
Pt remains A&Ox4. VS stable. No respiratory distress noted. respirations even and unlabored.  Denies SOB, chest pain, headache/dizziness. R AC 20 WDL. Medications administered as ordered. Pt safety precautions maintained. Pt care ongoing. Awaiting transport to adm bed.

## 2025-03-27 NOTE — H&P ADULT - ATTENDING COMMENTS
55M with PMH of DM2 (basal/bolus insulin), gastric bypass sent in by podiatry for evaluation of dry gangrene. On arrival - pt noted to have markedly elevated glucose w/ anion gap acidosis + elevated BHB. Pt was hydrated and given SQ insulin. Gap narrowed and glycemic control improved. CXR and XR of foot unrevealing for any acute infectious process. Pt admitted for further evaluation.    Pt seen at bedside. Doing well. No c/o pain. Pt noted to be eating while NPO. He states his FS at home runs in the 280s. He reports being adherent w/ his home meds and adherent to an appropriate DM diet. On exam - NAD. In bed. Foot wrapped. Dressing C/D/I. Labs reviewed - AG down, BHB still high. FS downtrending.    #DM2 w/ hyperglycemia. DKA  #Diabetic foot infection, dry gangrene of left foot  #HAGMA    -FS improved. Restart CC-diet. Received lantus at 6AM.   -Continue correctional - moderate TIDAC.  -Basal/bolus regimen as per endocrine.  -A1c 16.  -DM education.  -Continue IV vanco/zosyn. De-escalate pending cx data. F/u MRSA PCR.  -INDY/PVR.  -F/u vasc/pod.    Rest of plan as above.

## 2025-03-27 NOTE — CONSULT NOTE ADULT - ATTENDING COMMENTS
55 year old man with peripheral arterial disease  chronic limb threatening ischemia of the left lower extremity, William 5  INDY/PVRs with toe pressures reviewed - adequate toe pressures for wound healing potential  will follow for intra-operative bleeding during podiatric surgical intervention
55M severely uncontrolled DM2 HbA1c 16.7% non-adherence with prescribed insulin and poor diet now with foot infection may need surgery. Agree with basal bolus insulin as outlined. Explained to patient importance of taking his insulin and improving diet to promote wound healing and prevent further complications. High risk patient high level decision making.    Racheal Conley MD  Division of Endocrinology  Pager: [phone]    If after 6PM or before 9AM, or on weekends/holidays, please call endocrine answering service for assistance ([phone]).  For nonurgent matters email [email or web address] for assistance.

## 2025-03-27 NOTE — H&P ADULT - NSHPLABSRESULTS_GEN_ALL_CORE
11.1   14.25 )-----------( 383      ( 26 Mar 2025 22:23 )             34.9       03-27    135  |  99  |  10  ----------------------------<  262[H]  4.2   |  21[L]  |  0.64    Ca    9.1      27 Mar 2025 10:55  Phos  2.6     03-27  Mg     1.90     03-27    TPro  6.8  /  Alb  3.1[L]  /  TBili  <0.2  /  DBili  x   /  AST  9   /  ALT  5   /  AlkPhos  103  03-27              Urinalysis Basic - ( 27 Mar 2025 10:55 )    Color: x / Appearance: x / SG: x / pH: x  Gluc: 262 mg/dL / Ketone: x  / Bili: x / Urobili: x   Blood: x / Protein: x / Nitrite: x   Leuk Esterase: x / RBC: x / WBC x   Sq Epi: x / Non Sq Epi: x / Bacteria: x        PT/INR - ( 26 Mar 2025 22:23 )   PT: 10.5 sec;   INR: <0.90 ratio         PTT - ( 26 Mar 2025 22:23 )  PTT:32.6 sec          CAPILLARY BLOOD GLUCOSE      POCT Blood Glucose.: 204 mg/dL (27 Mar 2025 14:24)

## 2025-03-27 NOTE — H&P ADULT - PROBLEM SELECTOR PLAN 3
h/o gastric bypass for the sake of blood sugar control. Pt says max weight was low 200s.     - consult nutrition h/o gastric bypass for the sake of blood sugar control. Pt denies diarrhea, constipation, any GI sx.    - consult nutrition

## 2025-03-27 NOTE — PROGRESS NOTE ADULT - SUBJECTIVE AND OBJECTIVE BOX
Patient is a 55y old  Male who presents with a chief complaint of     SUBJECTIVE / OVERNIGHT EVENTS:  Patient seen and evaluated at bedside.    Denies any fevers, chills, CP, or SOB.      REVIEW OF SYSTEMS:    CONSTITUTIONAL:  No weakness, fevers or chills  EYES/ENT:  No visual changes;  No vertigo or throat pain   NECK:  No pain or stiffness  RESPIRATORY:  No cough, wheezing, hemoptysis; No shortness of breath  CARDIOVASCULAR:  No chest pain or palpitations  GASTROINTESTINAL:  No abdominal or epigastric pain. No nausea, vomiting, or hematemesis; No diarrhea or constipation. No melena or hematochezia.  GENITOURINARY:  No dysuria, frequency or hematuria  NEUROLOGICAL:  No numbness or weakness  SKIN:  No itching, rashes        MEDICATIONS:  acetaminophen     Tablet .. 650 milliGRAM(s) Oral every 6 hours PRN Temp greater or equal to 38C (100.4F), Mild Pain (1 - 3)  aluminum hydroxide/magnesium hydroxide/simethicone Suspension 30 milliLiter(s) Oral every 4 hours PRN Dyspepsia  melatonin 6 milliGRAM(s) Oral at bedtime PRN Insomnia  ondansetron Injectable 4 milliGRAM(s) IV Push every 8 hours PRN Nausea and/or Vomiting        Vital Signs Last 24 Hrs  T(C): 36.9 (27 Mar 2025 06:55), Max: 37.1 (27 Mar 2025 01:03)  T(F): 98.4 (27 Mar 2025 06:55), Max: 98.7 (27 Mar 2025 01:03)  HR: 81 (27 Mar 2025 06:55) (81 - 96)  BP: 117/70 (27 Mar 2025 06:55) (117/70 - 166/83)  BP(mean): --  RR: 16 (27 Mar 2025 06:55) (15 - 18)  SpO2: 98% (27 Mar 2025 06:55) (97% - 100%)    Parameters below as of 27 Mar 2025 06:55  Patient On (Oxygen Delivery Method): room air          PHYSICAL EXAM:  GENERAL: NAD, lying in bed comfortably, conversational  HEAD:  Atraumatic, normocephalic  EYES: EOMI, PERRLA, conjunctiva and sclera clear  NECK: Supple, trachea midline, no JVD  HEART: +S1/S2, RRR, no murmurs, rubs, or gallops  LUNGS: Unlabored respirations. CTA b/l, no crackles, wheezing, or rhonchi  ABDOMEN: Soft, NTND, +BS. No masses or hernia palpated  EXTREMITIES: 2+ peripheral pulses bilaterally. No clubbing, cyanosis, or edema  MSK: no gross deformity in extremities, no step off or deformity in C/T/L spine, normal muscle strength/tone  NERVOUS SYSTEM: CN II-XII intact, A&Ox3, moving all extremities spontaneously, no focal deficits, sensation intact and equal in b/l extremities  SKIN: No rashes or lesions      LABS:                        11.1   14.25 )-----------( 383      ( 26 Mar 2025 22:23 )             34.9     Hgb Trend: 11.1<--  03-27    132[L]  |  96[L]  |  14  ----------------------------<  373[H]  3.9   |  19[L]  |  0.77    Ca    9.1      27 Mar 2025 02:36  Phos  2.6     03-27  Mg     1.90     03-27    TPro  8.9[H]  /  Alb  4.1  /  TBili  0.3  /  DBili  x   /  AST  10  /  ALT  9   /  AlkPhos  148[H]  03-26    Creatinine Trend: 0.77<--, 0.89<--      LIVER FUNCTIONS - ( 26 Mar 2025 22:23 )  Alb: 4.1 g/dL / Pro: 8.9 g/dL / ALK PHOS: 148 U/L / ALT: 9 U/L / AST: 10 U/L / GGT: x           PT/INR - ( 26 Mar 2025 22:23 )   PT: 10.5 sec;   INR: <0.90 ratio         PTT - ( 26 Mar 2025 22:23 )  PTT:32.6 sec  Urinalysis Basic - ( 27 Mar 2025 02:36 )    Color: x / Appearance: x / SG: x / pH: x  Gluc: 373 mg/dL / Ketone: x  / Bili: x / Urobili: x   Blood: x / Protein: x / Nitrite: x   Leuk Esterase: x / RBC: x / WBC x   Sq Epi: x / Non Sq Epi: x / Bacteria: x

## 2025-03-27 NOTE — DISCHARGE NOTE PROVIDER - CARE PROVIDER_API CALL
Lyly Osuna  Foot and Ankle Surgery  [address] [facility], [place] [address]  Phone: [phone]  Fax: [phone]  Follow Up Time: 1 week

## 2025-03-27 NOTE — CONSULT NOTE ADULT - ASSESSMENT
ASSESSMENT: 55M PMH of DM2 (home med lantus 12 units qhs), gastric bypass, sent in by podiatrist Dr. Fernandez for progressively worsening left foot 2nd toe dry gangrene for admission. Vascular surgery consulted for evaluation.     Recommendations:  - INDY/PVRs w/ toe pressure  - Local wound care  - endocrine consult for diabetes management   - vascular surgery to follow    Plan Discussed with Vascular Surgery Fellow on behalf of Dr. Dawson    Vascular Surgery   [id number]/ [id number]   ASSESSMENT: 55M PMH of DM2 (home med lantus 12 units qhs), gastric bypass, sent in by podiatrist Dr. Fernandez for progressively worsening left foot 2nd toe dry gangrene for admission without concerns for OM at this time. Vascular surgery consulted for evaluation.     Recommendations:  - INDY/PVRs w/ toe pressure  - Local wound care  - endocrine consult for diabetes management   - vascular surgery to follow    Plan Discussed with Vascular Surgery Fellow on behalf of Dr. Dawson    Vascular Surgery   [id number]/ [id number]   ASSESSMENT: 55M PMH of DM2 (home med lantus 12 units qhs, last HgA1c 12 in 12/2024), gastric bypass, sent in by podiatrist Dr. Fernandez for progressively worsening left foot 2nd toe dry gangrene for admission without concerns for OM at this time. Vascular surgery consulted for evaluation. Denies claudications. Reports of multiple episodes of LE wounds, but healed on itself in the past. Palpable L DP/PT pulses on exam.    Recommendations:  - INDY/PVRs w/ toe pressure reviewed - R 115 and L 79 with good waveform. Podiatry can proceed with surgical intervention. Will follow for intraop bleeding.  - Local wound care  - endocrine consult for diabetes management   - vascular surgery to follow    Plan Discussed with Vascular Surgery Fellow on behalf of Dr. Dawson    Vascular Surgery   [id number]/ [id number]

## 2025-03-27 NOTE — PROGRESS NOTE ADULT - ASSESSMENT
55M presents for left foot 2nd digit gangrene  -Pt was seen and evaluated  -Afebrile, WBC 14.25, ESR 81, CRP 11.80   -Left foot 2nd digit full thickness dry gangrene, no wet changes, no malodor, no fluctuance, right foot posteromedial ankle wound to subQ, no drainage, no fluctuance, no malodor  -Left foot X-ray: no gas, no OM   -No culture obtained 2/2 no signs of acute infection  - INDY/PVR: RABI 1.32 RTBI 0.73 LABI 1.18 LTBI 0.44   -Vascular recs appreciated, okay to proceed with podiatric surgery  -Pod Plan: left foot partial 2nd ray resection pending vascular recs  - Booked tentatively for left foot partial 2nd ray resection with Dr. Ferro on Thursday 4/3 at 3:30pm. Will inquire about earlier OR date given no need for vascular intervention.  -Please document medical clearance for possible podiatric surgery under anesthesia  -Discussed with attending

## 2025-03-27 NOTE — H&P ADULT - PROBLEM SELECTOR PLAN 2
Pt with hx TD2M. Home med is 12 units lantis qhs. Self reports last A1C is 12 (December), was 16.7 in ED.   In ED, missed one dose of lantus, was given lispro 10 units at midnight, then 12 units lantus at 6am. In ED, blood sugar was 720, 564, then given admelog, dropped to 373.    - NPO, q4h ISS  - consult endocrinology  - will need proper diabetes education  - trend K+ Pt with hx TD2M. Home med is 12 units lantis qhs. Self reports last A1C is 12 (December), was 16.7 in ED.   In ED, missed one dose of lantus, blood sugar was 720. Was given admelog 10 units at midnight, blood sugar dropped 720 to 564. Was then given 12 units lantus at 6am, blood sugar dropped to 373.     - f/u blood glucose and CMP, if uptrending/close to normal range, can take off NPO  - q4h ISS  - endocrinology following  - will need proper diabetes education  - trend K+ Pt with hx TD2M. Home med is 12 units lantis qhs. Self reports last A1C is 12 (December), was 16.7 in ED.   In ED, missed one dose of lantus, blood sugar was 720. Was given admelog 10 units at midnight, blood sugar dropped 720 to 564. Was then given 12 units lantus at 6am, blood sugar dropped to 373.     - f/u blood glucose and CMP, take off NPO once DKA resolves  - q4h ISS  - endocrinology following  - will need proper diabetes education  - trend K+

## 2025-03-27 NOTE — PATIENT PROFILE ADULT - VISION (WITH CORRECTIVE LENSES IF THE PATIENT USUALLY WEARS THEM):
----- Message from Camilo Schneider sent at 3/18/2020  3:54 PM EDT -----  Regarding: [facility]  03/18/20 3:55 PM    Hello, our patient Kg Couch  has had Diabetic Eye Exam completed/performed  Please assist in updating the patient chart by contacting [facility] [phone]  The date of service is Feb 2020      Thank you,  Camilo Schneider  [facility] Normal vision: sees adequately in most situations; can see medication labels, newsprint

## 2025-03-27 NOTE — CONSULT NOTE ADULT - SUBJECTIVE AND OBJECTIVE BOX
Patient is a 55y old  Male who presents with a chief complaint of left foot gangrene    HPI: 54 y/o M with PMH of DM2, gastric bypass, presents sent in by podiatrist Dr. Fernandez for progressively worsening left foot 2nd toe dry gangrene for admission. Patient has been following up with his podiatrist every 2 weeks for dry gangrene of his left foot 2nd toe but despite the continued care has been having progression of his infection. He also notes associated numbness and swelling into the foot. He was at the office earlier today and the doctor recommended he come to the ED for admission to receive IV abx and possible amputation of the toe. Denies any other complaints or concerns. Denies chest pain, SOB, cough, fevers, chills, abdominal pain, N/V/D/C, urinary complaints, HA, dizziness, tingling, weakness, trauma, injuries, falls, sick contacts, recent travel.      PAST MEDICAL & SURGICAL HISTORY:  Diabetes type 2, controlled      History of Jennifer-en-Y gastric bypass          MEDICATIONS  (STANDING):  insulin lispro Injectable (ADMELOG) 12 Unit(s) SubCutaneous Once  lactated ringers Bolus 1000 milliLiter(s) IV Bolus once    MEDICATIONS  (PRN):      Allergies    No Known Allergies    Intolerances        VITALS:    Vital Signs Last 24 Hrs  T(C): 36.9 (26 Mar 2025 20:12), Max: 36.9 (26 Mar 2025 20:12)  T(F): 98.4 (26 Mar 2025 20:12), Max: 98.4 (26 Mar 2025 20:12)  HR: 96 (26 Mar 2025 20:12) (94 - 96)  BP: 166/83 (26 Mar 2025 20:12) (144/75 - 166/83)  BP(mean): --  RR: 16 (26 Mar 2025 20:12) (16 - 18)  SpO2: 99% (26 Mar 2025 20:12) (97% - 99%)    Parameters below as of 26 Mar 2025 19:01  Patient On (Oxygen Delivery Method): room air        LABS:                          11.1   14.25 )-----------( 383      ( 26 Mar 2025 22:23 )             34.9       03-26    124[L]  |  86[L]  |  18  ----------------------------<  720[HH]  4.8   |  15[L]  |  0.89    Ca    9.8      26 Mar 2025 22:23    TPro  8.9[H]  /  Alb  4.1  /  TBili  0.3  /  DBili  x   /  AST  10  /  ALT  9   /  AlkPhos  148[H]  03-26      CAPILLARY BLOOD GLUCOSE      POCT Blood Glucose.: >600 mg/dL (26 Mar 2025 19:05)  POCT Blood Glucose.: >600 mg/dL (26 Mar 2025 19:05)      PT/INR - ( 26 Mar 2025 22:23 )   PT: 10.5 sec;   INR: <0.90 ratio         PTT - ( 26 Mar 2025 22:23 )  PTT:32.6 sec    LOWER EXTREMITY PHYSICAL EXAM:    Vascular: DP/PT 0/4, B/L, CFT <3 seconds B/L, Temperature gradient warm to cool, B/L.   Neuro: Epicritic sensation diminished to the level of digits, B/L.  Musculoskeletal/Ortho: unremarkable  Skin: left foot 2nd digit full thickness dry gangrene, no wet changes, no malodor, no fluctuance, right foot posteromedial ankle wound to subQ, no drainage, no fluctuance, no malodor    RADIOLOGY & ADDITIONAL STUDIES:

## 2025-03-27 NOTE — DISCHARGE NOTE PROVIDER - NSDCFUSCHEDAPPT_GEN_ALL_CORE_FT
[facility]  INT[facility] OP [id number] [address]  Scheduled Appointment: 04/09/2025

## 2025-03-27 NOTE — CONSULT NOTE ADULT - SUBJECTIVE AND OBJECTIVE BOX
Surgery Consult Note  Attending: Samir  Service: Vascular Surgery  [id number]      HPI: 55M PMH of DM2 (home med lantus 12 units qhs), gastric bypass, sent in by podiatrist Dr. Fernandez for progressively worsening left foot 2nd toe dry gangrene for admission. Pt has been following up with this podiatrist since December 2024, q2 weeks, but infection has progressed despite continued care. Pods planning amp vs local wound care. Vascular surgery consulted for evaluation.     Patient seen and examined. Patient has never seen a vascular surgeon. Ambulates normally without difficulty. Has never had a wound like this before. Does not take any AP or AC.       PAST MEDICAL HISTORY:  PAST MEDICAL & SURGICAL HISTORY:  Uncontrolled type 2 diabetes mellitus with hyperglycemia    History of Jennifer-en-Y gastric bypass          ALLERGIES:  Allergies    No Known Allergies    Intolerances        SOCIAL HISTORY: Negative for tobacco, etoh, or drug use    FAMILY HISTORY:  FAMILY HISTORY:  FH: diabetes mellitus (Mother)      PHYSICAL EXAM:  General: NAD, resting comfortably  Pulmonary: normal resp effort, CTA-B  Abdominal: soft, ND/NT  Extremities: left foot 2nd digit full thickness dry gangrene, no wet changes, no malodor, no fluctuance, right foot posteromedial ankle wound to subQ, no drainage, no fluctuance, no malodor    Vascular Pulse Exam:  Right Femoral:  Palpable       Left Femoral:  Palpable  Right DP:  Palpable                 Left DP:  Palpable  Right PT:  Palpable                 Left PT:  Palpable    VITAL SIGNS:  Vital Signs Last 24 Hrs  T(C): 36.9 (27 Mar 2025 06:55), Max: 37.1 (27 Mar 2025 01:03)  T(F): 98.4 (27 Mar 2025 06:55), Max: 98.7 (27 Mar 2025 01:03)  HR: 81 (27 Mar 2025 06:55) (81 - 96)  BP: 117/70 (27 Mar 2025 06:55) (117/70 - 166/83)  BP(mean): --  RR: 16 (27 Mar 2025 06:55) (15 - 18)  SpO2: 98% (27 Mar 2025 06:55) (97% - 100%)    Parameters below as of 27 Mar 2025 06:55  Patient On (Oxygen Delivery Method): room air        I&O's Summary      LABS:                        11.1   14.25 )-----------( 383      ( 26 Mar 2025 22:23 )             34.9     03-27    135  |  99  |  10  ----------------------------<  262[H]  4.2   |  21[L]  |  0.64    Ca    9.1      27 Mar 2025 10:55  Phos  2.6     03-27  Mg     1.90     03-27    TPro  6.8  /  Alb  3.1[L]  /  TBili  <0.2  /  DBili  x   /  AST  9   /  ALT  5   /  AlkPhos  103  03-27    PT/INR - ( 26 Mar 2025 22:23 )   PT: 10.5 sec;   INR: <0.90 ratio         PTT - ( 26 Mar 2025 22:23 )  PTT:32.6 sec  Urinalysis Basic - ( 27 Mar 2025 10:55 )    Color: x / Appearance: x / SG: x / pH: x  Gluc: 262 mg/dL / Ketone: x  / Bili: x / Urobili: x   Blood: x / Protein: x / Nitrite: x   Leuk Esterase: x / RBC: x / WBC x   Sq Epi: x / Non Sq Epi: x / Bacteria: x      CAPILLARY BLOOD GLUCOSE      POCT Blood Glucose.: 269 mg/dL (27 Mar 2025 10:48)  POCT Blood Glucose.: 265 mg/dL (27 Mar 2025 06:10)  POCT Blood Glucose.: 564 mg/dL (27 Mar 2025 00:33)  POCT Blood Glucose.: >600 mg/dL (26 Mar 2025 19:05)  POCT Blood Glucose.: >600 mg/dL (26 Mar 2025 19:05)    LIVER FUNCTIONS - ( 27 Mar 2025 10:55 )  Alb: 3.1 g/dL / Pro: 6.8 g/dL / ALK PHOS: 103 U/L / ALT: 5 U/L / AST: 9 U/L / GGT: x             CULTURES:      RADIOLOGY & ADDITIONAL STUDIES: Surgery Consult Note  Attending: Samir  Service: Vascular Surgery  [id number]      HPI: 55M PMH of DM2 (home med lantus 12 units qhs), gastric bypass, sent in by podiatrist Dr. Fernandez for progressively worsening left foot 2nd toe dry gangrene for admission. Pt has been following up with this podiatrist since December 2024, q2 weeks, but infection has progressed despite continued care. Pods planning amp vs local wound care. No concerns for OM at this time. Vascular surgery consulted for evaluation.     Patient seen and examined. Patient has never seen a vascular surgeon. Ambulates normally without difficulty. Has never had a wound like this before. Does not take any AP or AC.       PAST MEDICAL HISTORY:  PAST MEDICAL & SURGICAL HISTORY:  Uncontrolled type 2 diabetes mellitus with hyperglycemia    History of Jennifer-en-Y gastric bypass          ALLERGIES:  Allergies    No Known Allergies    Intolerances        SOCIAL HISTORY: Negative for tobacco, etoh, or drug use    FAMILY HISTORY:  FAMILY HISTORY:  FH: diabetes mellitus (Mother)      PHYSICAL EXAM:  General: NAD, resting comfortably  Pulmonary: normal resp effort, CTA-B  Abdominal: soft, ND/NT  Extremities: left foot 2nd digit full thickness dry gangrene, no wet changes, no malodor, no fluctuance, right foot posteromedial ankle wound to subQ, no drainage, no fluctuance, no malodor    Vascular Pulse Exam:  Right Femoral:  Palpable       Left Femoral:  Palpable  Right DP:  Palpable                 Left DP:  Palpable  Right PT:  Palpable                 Left PT:  Palpable    VITAL SIGNS:  Vital Signs Last 24 Hrs  T(C): 36.9 (27 Mar 2025 06:55), Max: 37.1 (27 Mar 2025 01:03)  T(F): 98.4 (27 Mar 2025 06:55), Max: 98.7 (27 Mar 2025 01:03)  HR: 81 (27 Mar 2025 06:55) (81 - 96)  BP: 117/70 (27 Mar 2025 06:55) (117/70 - 166/83)  BP(mean): --  RR: 16 (27 Mar 2025 06:55) (15 - 18)  SpO2: 98% (27 Mar 2025 06:55) (97% - 100%)    Parameters below as of 27 Mar 2025 06:55  Patient On (Oxygen Delivery Method): room air        I&O's Summary      LABS:                        11.1   14.25 )-----------( 383      ( 26 Mar 2025 22:23 )             34.9     03-27    135  |  99  |  10  ----------------------------<  262[H]  4.2   |  21[L]  |  0.64    Ca    9.1      27 Mar 2025 10:55  Phos  2.6     03-27  Mg     1.90     03-27    TPro  6.8  /  Alb  3.1[L]  /  TBili  <0.2  /  DBili  x   /  AST  9   /  ALT  5   /  AlkPhos  103  03-27    PT/INR - ( 26 Mar 2025 22:23 )   PT: 10.5 sec;   INR: <0.90 ratio         PTT - ( 26 Mar 2025 22:23 )  PTT:32.6 sec  Urinalysis Basic - ( 27 Mar 2025 10:55 )    Color: x / Appearance: x / SG: x / pH: x  Gluc: 262 mg/dL / Ketone: x  / Bili: x / Urobili: x   Blood: x / Protein: x / Nitrite: x   Leuk Esterase: x / RBC: x / WBC x   Sq Epi: x / Non Sq Epi: x / Bacteria: x      CAPILLARY BLOOD GLUCOSE      POCT Blood Glucose.: 269 mg/dL (27 Mar 2025 10:48)  POCT Blood Glucose.: 265 mg/dL (27 Mar 2025 06:10)  POCT Blood Glucose.: 564 mg/dL (27 Mar 2025 00:33)  POCT Blood Glucose.: >600 mg/dL (26 Mar 2025 19:05)  POCT Blood Glucose.: >600 mg/dL (26 Mar 2025 19:05)    LIVER FUNCTIONS - ( 27 Mar 2025 10:55 )  Alb: 3.1 g/dL / Pro: 6.8 g/dL / ALK PHOS: 103 U/L / ALT: 5 U/L / AST: 9 U/L / GGT: x             CULTURES:      RADIOLOGY & ADDITIONAL STUDIES:

## 2025-03-27 NOTE — H&P ADULT - NSHPSOCIALHISTORY_GEN_ALL_CORE
Lives with aunt in [place]. Works at a tire shop. No hx of smoking, vaping, alcohol or other drug use. ,  has adult children. Lives with aunt in [place]. Retired; worked at a [employer] shop, but stopped working 2 years ago to avoid foot infections. No hx of smoking, vaping, alcohol or other drug use. Pt states he has no concerns paying for health insurance, housing, food.

## 2025-03-27 NOTE — H&P ADULT - PROBLEM SELECTOR PLAN 1
B/l LE ulcerations chronic. L>R    -f/u blood cultures  -CRP, ESR  -podiatry consult L foot 2nd digit gangrene. L foot xray showed no gas, no osteomyelitis. Podiatry following- local wound care v. left foot partial 2nd ray resection pending vascular recs.      - f/u INDY/PVR  - f/u blood cultures  - consult vascular L foot 2nd digit gangrene. L foot xray showed no gas, no osteomyelitis. Podiatry following- local wound care v. left foot partial 2nd ray resection pending vascular recs.    - give IV zosyn + vancomycin (cover for gram+ cocci gram- rods, anaerobes, MRSA)  - f/u INDY/PVR  - f/u blood cultures  - consult vascular L foot 2nd digit gangrene. L foot xray showed no gas, no osteomyelitis. Podiatry following; local wound care v. left foot partial 2nd ray resection pending vascular recs.    - c/w IV zosyn + vancomycin (cover for gram+ cocci gram- rods, anaerobes, MRSA)  - f/u INDY/PVR  - f/u blood cultures  - f/u vascular consult

## 2025-03-27 NOTE — DISCHARGE NOTE PROVIDER - NSFOLLOWUPCLINICS_GEN_ALL_ED_FT
[facility]  Endocrinology  [address] [place] [address]  Phone: [phone]  Fax:   Follow Up Time: 1 week

## 2025-03-27 NOTE — DISCHARGE NOTE PROVIDER - NSDCMRMEDTOKEN_GEN_ALL_CORE_FT
Admelog SoloStar 100 units/mL injectable solution: 7 unit(s) injectable 3 times a day (before meals)  alcohol swabs: Apply topically to affected area 4 times a day  atorvastatin 40 mg oral tablet: 1 tab(s) orally once a day (at bedtime)  Dexcom G7 : Use as directed  Dexcom G7 Sensors: Change every 10 days  ferrous sulfate 325 mg (65 mg elemental iron) oral tablet: 1 tab(s) orally 3 times a week Take Monday, Wednesday and Friday  glucometer (per patient&#x27;s insurance): Test blood sugars four times a day. Dispense #1 glucometer.  glucometer (per patient&#x27;s insurance): Test blood sugars four times a day. Dispense #1 glucometer.  glucose tablets: Follow instructions on bottle when sugar is low.  glucose tablets: Follow instructions on bottle when sugar is low.  lancets: 1 application subcutaneously 4 times a day  lancets: 1 application subcutaneously 4 times a day  Lantus Solostar Pen 100 units/mL subcutaneous solution: 12 unit(s) subcutaneous once a day (at bedtime)  Multiple Vitamins oral tablet: 1 tab(s) orally once a day  pantoprazole 40 mg oral delayed release tablet: 1 tab(s) orally 2 times a day  Pen Needles: Please use for your insulin pens  sucralfate 1 g oral tablet: 1 tab(s) orally 4 times a day  Syringes: Use for the injection of your insulin  test strips (per patient&#x27;s insurance): 1 application subcutaneously 4 times a day. ** Compatible with patient&#x27;s glucometer **  test strips (per patient&#x27;s insurance): 1 application subcutaneously 4 times a day. ** Compatible with patient&#x27;s glucometer **  Urine ketone strips: Use strip mid-urine stream as needed to assess for ketones. Call medical provider or 911 if moderate to high ketones are present.

## 2025-03-27 NOTE — PATIENT PROFILE ADULT - OVER THE PAST TWO WEEKS, HAVE YOU FELT LITTLE INTEREST OR PLEASURE IN DOING THINGS?
Problem: Pressure Injury, Risk for  Goal: # Skin remains intact  Outcome: Outcome Met, Continue evaluating goal progress toward completion     Problem: Diabetes  Goal: Glycemic balance achieved/maintained  Description: Goal is to maintain blood sugar within range with no episodes of hypoglycemia  Outcome: Outcome Met, Continue evaluating goal progress toward completion     Problem: VTE, Risk for  Goal: # No s/s of VTE  Outcome: Outcome Met, Continue evaluating goal progress toward completion     Problem: At Risk for Falls  Goal: # Patient does not fall  Outcome: Outcome Met, Continue evaluating goal progress toward completion     Problem: At Risk for Injury Due to Fall  Goal: # Patient does not fall  Outcome: Outcome Met, Continue evaluating goal progress toward completion      no

## 2025-03-27 NOTE — H&P ADULT - NSICDXFAMILYHX_GEN_ALL_CORE_FT
FAMILY HISTORY:  Mother  Still living? Unknown  FH: diabetes mellitus, Age at diagnosis: Age Unknown     FAMILY HISTORY:  Mother  Still living? Yes, Estimated age: Age Unknown  FH: type 2 diabetes mellitus, Age at diagnosis: Age Unknown    Sibling  Still living? Yes, Estimated age: Age Unknown  FH: type 2 diabetes mellitus, Age at diagnosis: Age Unknown

## 2025-03-27 NOTE — CONSULT NOTE ADULT - TIME BILLING
peripheral arterial disease evaluation and management  coordination of care  chart review  note writing

## 2025-03-27 NOTE — DISCHARGE NOTE PROVIDER - NSDCFUADDAPPT_GEN_ALL_CORE_FT
Podiatry Discharge Instructions:  Follow up: Please follow up with Dr. Osuna within 1 week of discharge from the hospital, please call [phone] for appointment and discuss that you recently were seen in the hospital.  Wound Care: Please leave your dressing clean dry intact until your follow up appointment   Weight bearing: Please weight bear as tolerated to L heel in a surgical shoe.   APPTS ARE READY TO BE MADE: [ ] YES    Best Family or Patient Contact (if needed):    Additional Information about above appointments (if needed):    1: PCP  2: Endocrinology (appointment scheduled 9/2/25 at [address] [place] [address])  3: Podiatry     Other comments or requests:  Podiatry Discharge Instructions:  Follow up: Please follow up with Dr. Osuna within 1 week of discharge from the hospital, please call [phone] for appointment and discuss that you recently were seen in the hospital.  Wound Care: Please leave your dressing clean dry intact until your follow up appointment   Weight bearing: Please weight bear as tolerated to L heel in a surgical shoe.

## 2025-03-27 NOTE — PROGRESS NOTE ADULT - PROBLEM SELECTOR PLAN 2
- B/l LE ulcerations chronic. L>R  - never evaluated by podiatry. These ulcers likely in setting of DM, but no sensory loss in LE    Plan:  - F/u blood cultures  - CRP, ESR  - Podiatry consult  - Hold off on antibiotics for now. Will observe

## 2025-03-27 NOTE — CONSULT NOTE ADULT - ASSESSMENT
The patient is a 55y Male with PMH of DM who presented to the hospital with LE infection.  Endocrinology consulted for hyperglycemia    #Uncontrolled Type 2 Diabetes Mellitus with hyperglycemia and   #DKA, mild (resolving)  - Follows with: Dr. Hernandez  - A1C with Estimated Average Glucose Result: 16.7 % (03-26-25)  - home regimen: Lantus 12 units, (not compliant with Admelog moderate dose sliding scale  - eGFR: 113 mL/min/1.73m2 (03-27-25)  - Weight (kg): 64 (03-27-25)  - glucose elevated, mild DKA on presentation showing improvement      INPATIENT PLAN:  - Recommend Lantus 12 units QAM  - Recommend Admelog 6 units TID before meals (HOLD if NPO or not eating)  - Recommend Low dose admelog correction scale TIDQAC and separate low dose scale QHS  - Please check FSG before meals and QHS, or q6h while NPO  - Inpatient glucose goals: 100-180  - consistent carb diet when eating  - nutrition consult placed  - Please repeat DKA labs this evening      DISCHARGE PLANNING:  - Discharge recs pending clinical course  - will need Endocrinology follow up. Please provide clinic info in DC paperwork for patient to make an appointment:    #Hyperlipidemia  - LDL goal <70  - Last LDL: 132 mg/dL (12-16-24)  - check lipid panel as outpatient on a yearly basis    Discussed with attending physician.  Warren Cordova,    PGY-4 Endocrine Fellow  Can be reached via Microsoft Teams.    For follow up questions, discharge recommendations or new consults, please email [email or web address] ([email or web address]) or [email or web address] ([facility]) or call the answering service at [phone] (weekdays); [phone] (nights/weekends).   For emergencies, please page fellow on call.    The patient is a 55y Male with PMH of DM who presented to the hospital with LE infection.  Endocrinology consulted for hyperglycemia    #Uncontrolled Type 2 Diabetes Mellitus with hyperglycemia and   #DKA, mild (resolving)  - Follows with: Dr. Hernandez  - A1C with Estimated Average Glucose Result: 16.7 % (03-26-25)  - home regimen: Lantus 12 units, (not compliant with Admelog moderate dose sliding scale  - eGFR: 113 mL/min/1.73m2 (03-27-25)  - Weight (kg): 64 (03-27-25)  - glucose elevated, mild DKA on presentation showing improvement      INPATIENT PLAN:  - Recommend Lantus 12 units QAM  - Recommend Admelog 6 units TID before meals (HOLD if NPO or not eating)  - Recommend Low dose admelog correction scale TIDQAC and separate low dose scale QHS  - Please check FSG before meals and QHS, or q6h while NPO  - Inpatient glucose goals: 100-180  - consistent carb diet when eating  - nutrition consult placed  - Please repeat DKA labs this evening      DISCHARGE PLANNING:  - Discharge recs basal bolus insulin, doses pending clinical course  - will need Endocrinology follow up. Please provide clinic info in DC paperwork for patient to make an appointment:    #Hyperlipidemia  - LDL goal <70  - Last LDL: 132 mg/dL (12-16-24)  - check lipid panel as outpatient on a yearly basis    Discussed with attending physician.  Warren Cordova,    PGY-4 Endocrine Fellow  Can be reached via Microsoft Teams.    For follow up questions, discharge recommendations or new consults, please email [email or web address] ([email or web address]) or [email or web address] ([facility]) or call the answering service at [phone] (weekdays); [phone] (nights/weekends).   For emergencies, please page fellow on call.

## 2025-03-27 NOTE — CONSULT NOTE ADULT - ASSESSMENT
55M presents for left foot 2nd digit gangrene  -Pt was seen and evaluated  -Afebrile, WBC 14.25, ESR/CRP ordered  -Left foot 2nd digit full thickness dry gangrene, no wet changes, no malodor, no fluctuance, right foot posteromedial ankle wound to subQ, no drainage, no fluctuance, no malodor  -Left foot X-ray: no gas, no OM (resident read)  -No culture obtained 2/2 no signs of acute infection  -Recommend admission to medicine   -Ordered INDY/PVR  -Recommend vascular consult  -Pod Plan: local wound care vs. left foot partial 2nd ray resection pending vascular recs  -Please document medical clearance for possible podiatric surgery under anesthesia  -Discussed with attending  55M presents for left foot 2nd digit gangrene  -Pt was seen and evaluated  -Afebrile, WBC 14.25, ESR/CRP ordered  -Left foot 2nd digit full thickness dry gangrene, no wet changes, no malodor, no fluctuance, right foot posteromedial ankle wound to subQ, no drainage, no fluctuance, no malodor  -Left foot X-ray: no gas, no OM (resident read)  -No culture obtained 2/2 no signs of acute infection  -Recommend admission to medicine   -Ordered INDY/PVR  -Recommend vascular consult  -Recommend endocrine consult  -Pod Plan: local wound care vs. left foot partial 2nd ray resection pending vascular recs  -Please document medical clearance for possible podiatric surgery under anesthesia  -Discussed with attending

## 2025-03-27 NOTE — DISCHARGE NOTE PROVIDER - NSDCCPTREATMENT_GEN_ALL_CORE_FT
PRINCIPAL PROCEDURE  Procedure: Partial amputation of second ray of left foot by open approach  Findings and Treatment: Lyly Osuna (Attending)  Resident/Fellow  Derrick Carter (Resident)  WILLIAM Sands  PRE-OP DIAGNOSIS:  Gangrene of toe of left foot 02-Apr-2025 10:51:03  Derrick Carter  POST-OP DIAGNOSIS:  Gangrene of toe of left foot 02-Apr-2025 10:51:22  Derrick Carter  PROCEDURES:  Partial amputation of second ray of left foot by open approach 02-Apr-2025 10:50:41  Derrick Carter  s/p L foot Partial Second Ray Resection, closed  - Inadequate intraop bleeding   - bone at proximal resection was hard and of good quality  - No purulence noted  - Incision was primarily closed using 3-0 Nylon  No  Pathology: 1) Left foot 2nd digit 2) L foot 2nd metatarsal head  5 milliLiter(s)  Followed protocol  s/p L foot Partial Second Ray Resection, closed  - Low concern for residual infection  - High concern for viability  - No further podiatric intervention necessary, stable for discharge from podiatry standpoint, pending final vasc recs  stable

## 2025-03-27 NOTE — CONSULT NOTE ADULT - SUBJECTIVE AND OBJECTIVE BOX
INCOMPLETE NOTE HPI:  56 y/o M with PMH of DM2, gastric bypass, sent to the ED by podiatrist Dr. Fernandez for progressively worsening left foot 2nd toe dry gangrene associated with ankle swelling. Pt has been seeing a podiatrist for diabetic lower extremity complications since December 2024, q2w. Two weeks ago at his previous podiatry appt, the left 2nd toe was fine with just a small scab. Pt reports the scab arose out of nowhere, was not 2/2 to any known trauma like tripping or a bug bite. The scab worsened and the toe progressively turned dark purple/near black in color. Two days ago, the ankle became swollen. When he went to the podiatrist yesterday (3/26), Dr. Fernandez sent him to the ED immediately. Pt denies numbness, weakness, pain, recent falls, tripping, feeling unsteady. At baseline, pt ambulates normally, can walk several blocks. Pt denies pain on walking or leg swelling at the end of the day. Pt denies chest pain, SOB, cough, fevers, chills, abdominal pain, n/v, urinary complaints, HA, dizziness, tingling, recent injuries, sick contacts, recent travel.    Pt was diagnosed with T2DM 20 years ago. He took metformin, but was hesitant to take insulin because he thought it would cause foot complications. He began having ulcers/poorly healing wounds on his lower legs and feet 2 years ago. In May 2024, he had a gastric bypass to reduce his blood sugar, and was prescribed insulin (lantus 12mg qhs, his current dose) but was not fully adherent until about one month ago. He says his blood sugars are typically around 250-280. He follows with endocrinologist Dr. Murillo, last appt 8 months ago. (27 Mar 2025 07:42)      DIABETES HX:  - History/diagnosis: Diagnosed with diabetes at age 22. Antibodies checked in the past are negative- SHEKHAR, zinc transporter 8, IA-2, ICA. 12/16- c-peptide 0.7 (serum glucose 250)  - Microvascular complications: reports neuropathy  - Macrovascular complications: denies CAD or CVA  - Follows with: endocrinology at [facility] Dr. Camila Hernandez  - A1C with Estimated Average Glucose Result: 16.7 % (03-26-25)  - Home regimen: Lantus 12 units, Admelog moderate dose sliding scale  - Adherence: somewhat compliant with Lantus, non comppliant with short acting insulin  - Previous meds: metformin discontinued  - Blood sugar: 200s fasting at home  - Hypoglycemia episodes: Denies  - Diet/lifestyle: Reports poor diet, with especially carb-heavy diet over the past 2 months.   - Symptoms: denies weight loss  - Family history: mother with T2DM    PAST MEDICAL & SURGICAL HISTORY:  Uncontrolled type 2 diabetes mellitus with hyperglycemia  History of Jennifer-en-Y gastric bypass      FAMILY HISTORY:  FH: type 2 diabetes mellitus (Mother, Sibling)          Inpatient medications:  MEDICATIONS  (STANDING):  chlorhexidine 2% Cloths 1 Application(s) Topical daily  dextrose 5%. 1000 milliLiter(s) (100 mL/Hr) IV Continuous <Continuous>  dextrose 5%. 1000 milliLiter(s) (50 mL/Hr) IV Continuous <Continuous>  dextrose 50% Injectable 25 Gram(s) IV Push once  dextrose 50% Injectable 12.5 Gram(s) IV Push once  dextrose 50% Injectable 25 Gram(s) IV Push once  dextrose Oral Gel 15 Gram(s) Oral once  glucagon  Injectable 1 milliGRAM(s) IntraMuscular once  insulin lispro (ADMELOG) corrective regimen sliding scale   SubCutaneous three times a day before meals  piperacillin/tazobactam IVPB.. 3.375 Gram(s) IV Intermittent every 8 hours  vancomycin  IVPB 1250 milliGRAM(s) IV Intermittent every 12 hours    MEDICATIONS  (PRN):  acetaminophen     Tablet .. 650 milliGRAM(s) Oral every 6 hours PRN Temp greater or equal to 38C (100.4F), Mild Pain (1 - 3)  aluminum hydroxide/magnesium hydroxide/simethicone Suspension 30 milliLiter(s) Oral every 4 hours PRN Dyspepsia  melatonin 6 milliGRAM(s) Oral at bedtime PRN Insomnia      Allergies    No Known Allergies    Intolerances      Review of Systems:  Constitutional: No fever  Eyes: No blurry vision  Neuro: No tremors  HEENT: No pain  Cardiovascular: No chest pain, palpitations  Respiratory: No SOB, no cough  GI: No nausea, vomiting, abdominal pain  : No dysuria  Skin: no rash  Psych: no depression  Endocrine: no polyuria, polydipsia  Hem/lymph: no swelling  Osteoporosis: no fractures    ALL OTHER SYSTEMS REVIEWED AND NEGATIVE    PHYSICAL EXAM:  VITALS: T(C): 36.9 (03-27-25 @ 06:55)  T(F): 98.4 (03-27-25 @ 06:55), Max: 98.7 (03-27-25 @ 01:03)  HR: 81 (03-27-25 @ 06:55) (81 - 96)  BP: 117/70 (03-27-25 @ 06:55) (117/70 - 166/83)  RR:  (15 - 18)  SpO2:  (97% - 100%)  Wt(kg): 64  GENERAL: NAD, well-groomed, well-developed  EYES: No proptosis, no lid lag, anicteric  HEENT:  Atraumatic, Normocephalic, moist mucous membranes  RESPIRATORY: Normal respiratory effort; no audible wheezing  SKIN: Dry, intact, No rashes or lesions, bandage on foot  MUSCULOSKELETAL: Full range of motion, normal strength  NEURO: sensation intact, extraocular movements intact, no tremor  PSYCH: Alert and oriented x 3, normal affect, normal mood  CUSHING'S SIGNS: no striae                          11.1   14.25 )-----------( 383      ( 26 Mar 2025 22:23 )             34.9       03-27    135  |  99  |  10  ----------------------------<  262[H]  4.2   |  21[L]  |  0.64    eGFR: 113    Ca    9.1      03-27  Mg     1.90     03-27  Phos  2.6     03-27    TPro  6.8  /  Alb  3.1[L]  /  TBili  <0.2  /  DBili  x   /  AST  9   /  ALT  5   /  AlkPhos  103  03-27      A1C with Estimated Average Glucose Result: 16.7 % (03-26-25 @ 22:23)  A1C with Estimated Average Glucose Result: 12.5 % (12-14-24 @ 00:48)      CAPILLARY BLOOD GLUCOSE      POCT Blood Glucose.: 203 mg/dL (27 Mar 2025 17:06)  POCT Blood Glucose.: 204 mg/dL (27 Mar 2025 14:24)  POCT Blood Glucose.: 269 mg/dL (27 Mar 2025 10:48)  POCT Blood Glucose.: 265 mg/dL (27 Mar 2025 06:10)  POCT Blood Glucose.: 564 mg/dL (27 Mar 2025 00:33)  POCT Blood Glucose.: >600 mg/dL (26 Mar 2025 19:05)  POCT Blood Glucose.: >600 mg/dL (26 Mar 2025 19:05)

## 2025-03-27 NOTE — PROGRESS NOTE ADULT - SUBJECTIVE AND OBJECTIVE BOX
Patient is a 55y old  Male who presents with a chief complaint of left foot gangrene (27 Mar 2025 15:36)       INTERVAL HPI/OVERNIGHT EVENTS:  Patient seen and evaluated at bedside.  Pt is resting comfortable in NAD. Denies N/V/F/C.  Pain rated at X/10    Allergies    No Known Allergies    Intolerances        Vital Signs Last 24 Hrs  T(C): 36.9 (27 Mar 2025 06:55), Max: 37.1 (27 Mar 2025 01:03)  T(F): 98.4 (27 Mar 2025 06:55), Max: 98.7 (27 Mar 2025 01:03)  HR: 81 (27 Mar 2025 06:55) (81 - 96)  BP: 117/70 (27 Mar 2025 06:55) (117/70 - 166/83)  BP(mean): --  RR: 16 (27 Mar 2025 06:55) (15 - 18)  SpO2: 98% (27 Mar 2025 06:55) (97% - 100%)    Parameters below as of 27 Mar 2025 06:55  Patient On (Oxygen Delivery Method): room air        LABS:                        11.1   14.25 )-----------( 383      ( 26 Mar 2025 22:23 )             34.9     03-27    135  |  99  |  10  ----------------------------<  262[H]  4.2   |  21[L]  |  0.64    Ca    9.1      27 Mar 2025 10:55  Phos  2.6     03-27  Mg     1.90     03-27    TPro  6.8  /  Alb  3.1[L]  /  TBili  <0.2  /  DBili  x   /  AST  9   /  ALT  5   /  AlkPhos  103  03-27    PT/INR - ( 26 Mar 2025 22:23 )   PT: 10.5 sec;   INR: <0.90 ratio         PTT - ( 26 Mar 2025 22:23 )  PTT:32.6 sec  Urinalysis Basic - ( 27 Mar 2025 10:55 )    Color: x / Appearance: x / SG: x / pH: x  Gluc: 262 mg/dL / Ketone: x  / Bili: x / Urobili: x   Blood: x / Protein: x / Nitrite: x   Leuk Esterase: x / RBC: x / WBC x   Sq Epi: x / Non Sq Epi: x / Bacteria: x      CAPILLARY BLOOD GLUCOSE      POCT Blood Glucose.: 204 mg/dL (27 Mar 2025 14:24)  POCT Blood Glucose.: 269 mg/dL (27 Mar 2025 10:48)  POCT Blood Glucose.: 265 mg/dL (27 Mar 2025 06:10)  POCT Blood Glucose.: 564 mg/dL (27 Mar 2025 00:33)  POCT Blood Glucose.: >600 mg/dL (26 Mar 2025 19:05)  POCT Blood Glucose.: >600 mg/dL (26 Mar 2025 19:05)      Lower Extremity Physical Exam:    Vascular: DP/PT 0/4, B/L, CFT <3 seconds B/L, Temperature gradient warm to cool, B/L.   Neuro: Epicritic sensation diminished to the level of digits, B/L.  Musculoskeletal/Ortho: unremarkable  Skin: left foot 2nd digit full thickness dry gangrene, no wet changes, no malodor, no fluctuance, right foot posteromedial ankle wound to subQ, no drainage, no fluctuance, no malodor    RADIOLOGY & ADDITIONAL TESTS:

## 2025-03-28 LAB
ALBUMIN SERPL ELPH-MCNC: 3 G/DL — LOW (ref 3.3–5)
ALP SERPL-CCNC: 92 U/L — SIGNIFICANT CHANGE UP (ref 40–120)
ALT FLD-CCNC: 5 U/L — SIGNIFICANT CHANGE UP (ref 4–41)
ANION GAP SERPL CALC-SCNC: 10 MMOL/L — SIGNIFICANT CHANGE UP (ref 7–14)
ANION GAP SERPL CALC-SCNC: 15 MMOL/L — HIGH (ref 7–14)
AST SERPL-CCNC: 10 U/L — SIGNIFICANT CHANGE UP (ref 4–40)
B-OH-BUTYR SERPL-SCNC: 0.7 MMOL/L — HIGH (ref 0–0.4)
B-OH-BUTYR SERPL-SCNC: 2.6 MMOL/L — HIGH (ref 0–0.4)
BILIRUB SERPL-MCNC: 0.2 MG/DL — SIGNIFICANT CHANGE UP (ref 0.2–1.2)
BLOOD GAS VENOUS COMPREHENSIVE RESULT: SIGNIFICANT CHANGE UP
BUN SERPL-MCNC: 11 MG/DL — SIGNIFICANT CHANGE UP (ref 7–23)
BUN SERPL-MCNC: 8 MG/DL — SIGNIFICANT CHANGE UP (ref 7–23)
CALCIUM SERPL-MCNC: 9.2 MG/DL — SIGNIFICANT CHANGE UP (ref 8.4–10.5)
CALCIUM SERPL-MCNC: 9.5 MG/DL — SIGNIFICANT CHANGE UP (ref 8.4–10.5)
CHLORIDE SERPL-SCNC: 100 MMOL/L — SIGNIFICANT CHANGE UP (ref 98–107)
CHLORIDE SERPL-SCNC: 99 MMOL/L — SIGNIFICANT CHANGE UP (ref 98–107)
CO2 SERPL-SCNC: 22 MMOL/L — SIGNIFICANT CHANGE UP (ref 22–31)
CO2 SERPL-SCNC: 27 MMOL/L — SIGNIFICANT CHANGE UP (ref 22–31)
CREAT SERPL-MCNC: 0.59 MG/DL — SIGNIFICANT CHANGE UP (ref 0.5–1.3)
CREAT SERPL-MCNC: 0.8 MG/DL — SIGNIFICANT CHANGE UP (ref 0.5–1.3)
EGFR: 106 ML/MIN/1.73M2 — SIGNIFICANT CHANGE UP
EGFR: 106 ML/MIN/1.73M2 — SIGNIFICANT CHANGE UP
EGFR: 116 ML/MIN/1.73M2 — SIGNIFICANT CHANGE UP
EGFR: 116 ML/MIN/1.73M2 — SIGNIFICANT CHANGE UP
GAS PNL BLDV: SIGNIFICANT CHANGE UP
GLUCOSE BLDC GLUCOMTR-MCNC: 223 MG/DL — HIGH (ref 70–99)
GLUCOSE BLDC GLUCOMTR-MCNC: 246 MG/DL — HIGH (ref 70–99)
GLUCOSE BLDC GLUCOMTR-MCNC: 246 MG/DL — HIGH (ref 70–99)
GLUCOSE BLDC GLUCOMTR-MCNC: 266 MG/DL — HIGH (ref 70–99)
GLUCOSE BLDC GLUCOMTR-MCNC: 281 MG/DL — HIGH (ref 70–99)
GLUCOSE BLDC GLUCOMTR-MCNC: 307 MG/DL — HIGH (ref 70–99)
GLUCOSE SERPL-MCNC: 229 MG/DL — HIGH (ref 70–99)
GLUCOSE SERPL-MCNC: 264 MG/DL — HIGH (ref 70–99)
HCT VFR BLD CALC: 29.4 % — LOW (ref 39–50)
HGB BLD-MCNC: 9.6 G/DL — LOW (ref 13–17)
MAGNESIUM SERPL-MCNC: 1.8 MG/DL — SIGNIFICANT CHANGE UP (ref 1.6–2.6)
MAGNESIUM SERPL-MCNC: 1.8 MG/DL — SIGNIFICANT CHANGE UP (ref 1.6–2.6)
MCHC RBC-ENTMCNC: 27.4 PG — SIGNIFICANT CHANGE UP (ref 27–34)
MCHC RBC-ENTMCNC: 32.7 G/DL — SIGNIFICANT CHANGE UP (ref 32–36)
MCV RBC AUTO: 84 FL — SIGNIFICANT CHANGE UP (ref 80–100)
NRBC # BLD AUTO: 0 K/UL — SIGNIFICANT CHANGE UP (ref 0–0)
NRBC # FLD: 0 K/UL — SIGNIFICANT CHANGE UP (ref 0–0)
NRBC BLD AUTO-RTO: 0 /100 WBCS — SIGNIFICANT CHANGE UP (ref 0–0)
PHOSPHATE SERPL-MCNC: 3 MG/DL — SIGNIFICANT CHANGE UP (ref 2.5–4.5)
PHOSPHATE SERPL-MCNC: 3.3 MG/DL — SIGNIFICANT CHANGE UP (ref 2.5–4.5)
PLATELET # BLD AUTO: 320 K/UL — SIGNIFICANT CHANGE UP (ref 150–400)
POTASSIUM SERPL-MCNC: 3.8 MMOL/L — SIGNIFICANT CHANGE UP (ref 3.5–5.3)
POTASSIUM SERPL-MCNC: 4.1 MMOL/L — SIGNIFICANT CHANGE UP (ref 3.5–5.3)
POTASSIUM SERPL-SCNC: 3.8 MMOL/L — SIGNIFICANT CHANGE UP (ref 3.5–5.3)
POTASSIUM SERPL-SCNC: 4.1 MMOL/L — SIGNIFICANT CHANGE UP (ref 3.5–5.3)
PROT SERPL-MCNC: 6.7 G/DL — SIGNIFICANT CHANGE UP (ref 6–8.3)
RBC # BLD: 3.5 M/UL — LOW (ref 4.2–5.8)
RBC # FLD: 12.8 % — SIGNIFICANT CHANGE UP (ref 10.3–14.5)
SODIUM SERPL-SCNC: 136 MMOL/L — SIGNIFICANT CHANGE UP (ref 135–145)
SODIUM SERPL-SCNC: 137 MMOL/L — SIGNIFICANT CHANGE UP (ref 135–145)
VANCOMYCIN TROUGH SERPL-MCNC: 9.4 UG/ML — LOW (ref 10–20)
WBC # BLD: 7.91 K/UL — SIGNIFICANT CHANGE UP (ref 3.8–10.5)
WBC # FLD AUTO: 7.91 K/UL — SIGNIFICANT CHANGE UP (ref 3.8–10.5)

## 2025-03-28 PROCEDURE — 99232 SBSQ HOSP IP/OBS MODERATE 35: CPT

## 2025-03-28 PROCEDURE — 99232 SBSQ HOSP IP/OBS MODERATE 35: CPT | Mod: GC

## 2025-03-28 RX ORDER — VANCOMYCIN HCL IN 5 % DEXTROSE 1.5G/250ML
PLASTIC BAG, INJECTION (ML) INTRAVENOUS
Refills: 0 | Status: DISCONTINUED | OUTPATIENT
Start: 2025-03-28 | End: 2025-03-29

## 2025-03-28 RX ORDER — VANCOMYCIN HCL IN 5 % DEXTROSE 1.5G/250ML
1500 PLASTIC BAG, INJECTION (ML) INTRAVENOUS ONCE
Refills: 0 | Status: COMPLETED | OUTPATIENT
Start: 2025-03-28 | End: 2025-03-28

## 2025-03-28 RX ORDER — INSULIN GLARGINE-YFGN 100 [IU]/ML
16 INJECTION, SOLUTION SUBCUTANEOUS
Refills: 0 | Status: DISCONTINUED | OUTPATIENT
Start: 2025-03-29 | End: 2025-03-29

## 2025-03-28 RX ORDER — SODIUM CHLORIDE 9 G/1000ML
2000 INJECTION, SOLUTION INTRAVENOUS
Refills: 0 | Status: COMPLETED | OUTPATIENT
Start: 2025-03-28 | End: 2025-03-28

## 2025-03-28 RX ORDER — VANCOMYCIN HCL IN 5 % DEXTROSE 1.5G/250ML
1500 PLASTIC BAG, INJECTION (ML) INTRAVENOUS EVERY 12 HOURS
Refills: 0 | Status: DISCONTINUED | OUTPATIENT
Start: 2025-03-29 | End: 2025-03-29

## 2025-03-28 RX ORDER — MUPIROCIN CALCIUM 20 MG/G
1 CREAM TOPICAL
Refills: 0 | Status: DISCONTINUED | OUTPATIENT
Start: 2025-03-28 | End: 2025-04-03

## 2025-03-28 RX ADMIN — INSULIN LISPRO 2: 100 INJECTION, SOLUTION INTRAVENOUS; SUBCUTANEOUS at 08:32

## 2025-03-28 RX ADMIN — Medication 25 GRAM(S): at 06:46

## 2025-03-28 RX ADMIN — INSULIN LISPRO 6 UNIT(S): 100 INJECTION, SOLUTION INTRAVENOUS; SUBCUTANEOUS at 17:15

## 2025-03-28 RX ADMIN — Medication 166.67 MILLIGRAM(S): at 05:15

## 2025-03-28 RX ADMIN — INSULIN GLARGINE-YFGN 12 UNIT(S): 100 INJECTION, SOLUTION SUBCUTANEOUS at 05:18

## 2025-03-28 RX ADMIN — Medication 25 GRAM(S): at 14:47

## 2025-03-28 RX ADMIN — Medication 1 APPLICATION(S): at 11:35

## 2025-03-28 RX ADMIN — Medication 25 GRAM(S): at 21:28

## 2025-03-28 RX ADMIN — MUPIROCIN CALCIUM 1 APPLICATION(S): 20 CREAM TOPICAL at 18:34

## 2025-03-28 RX ADMIN — Medication 300 MILLIGRAM(S): at 20:43

## 2025-03-28 RX ADMIN — INSULIN LISPRO 4: 100 INJECTION, SOLUTION INTRAVENOUS; SUBCUTANEOUS at 17:15

## 2025-03-28 RX ADMIN — INSULIN LISPRO 6 UNIT(S): 100 INJECTION, SOLUTION INTRAVENOUS; SUBCUTANEOUS at 12:16

## 2025-03-28 RX ADMIN — INSULIN LISPRO 6 UNIT(S): 100 INJECTION, SOLUTION INTRAVENOUS; SUBCUTANEOUS at 08:31

## 2025-03-28 RX ADMIN — SODIUM CHLORIDE 100 MILLILITER(S): 9 INJECTION, SOLUTION INTRAVENOUS at 11:35

## 2025-03-28 RX ADMIN — INSULIN LISPRO 1: 100 INJECTION, SOLUTION INTRAVENOUS; SUBCUTANEOUS at 21:31

## 2025-03-28 RX ADMIN — INSULIN LISPRO 3: 100 INJECTION, SOLUTION INTRAVENOUS; SUBCUTANEOUS at 12:16

## 2025-03-28 NOTE — DIETITIAN INITIAL EVALUATION ADULT - PROBLEM SELECTOR PLAN 3
h/o gastric bypass for the sake of blood sugar control. Pt denies diarrhea, constipation, any GI sx.    - consult nutrition

## 2025-03-28 NOTE — PROGRESS NOTE ADULT - SUBJECTIVE AND OBJECTIVE BOX
Vascular Surgery Progress Note     Subjective:  Patient seen and examined.       Vital Signs:  Vital Signs Last 24 Hrs  T(C): 36.8 (28 Mar 2025 05:37), Max: 37 (27 Mar 2025 21:43)  T(F): 98.2 (28 Mar 2025 05:37), Max: 98.6 (27 Mar 2025 21:43)  HR: 76 (28 Mar 2025 05:37) (76 - 77)  BP: 146/77 (28 Mar 2025 05:37) (146/77 - 146/82)  BP(mean): --  RR: 17 (28 Mar 2025 05:37) (17 - 17)  SpO2: 98% (28 Mar 2025 05:37) (96% - 98%)    Parameters below as of 28 Mar 2025 05:37  Patient On (Oxygen Delivery Method): room air        CAPILLARY BLOOD GLUCOSE      POCT Blood Glucose.: 223 mg/dL (28 Mar 2025 08:23)  POCT Blood Glucose.: 246 mg/dL (28 Mar 2025 05:13)  POCT Blood Glucose.: 246 mg/dL (28 Mar 2025 01:19)  POCT Blood Glucose.: 227 mg/dL (27 Mar 2025 21:24)  POCT Blood Glucose.: 203 mg/dL (27 Mar 2025 17:06)  POCT Blood Glucose.: 204 mg/dL (27 Mar 2025 14:24)  POCT Blood Glucose.: 269 mg/dL (27 Mar 2025 10:48)      I&O's Detail        Physical Exam:  General: NAD, resting comfortably  Pulmonary: normal resp effort, CTA-B  Abdominal: soft, ND/NT  Extremities: LE dressing c/d/i        Labs:    03-28    136  |  99  |  8   ----------------------------<  264[H]  3.8   |  22  |  0.59    Ca    9.2      28 Mar 2025 07:01  Phos  3.0     03-28  Mg     1.80     03-28    TPro  6.7  /  Alb  3.0[L]  /  TBili  0.2  /  DBili  x   /  AST  10  /  ALT  5   /  AlkPhos  92  03-28    LIVER FUNCTIONS - ( 28 Mar 2025 07:01 )  Alb: 3.0 g/dL / Pro: 6.7 g/dL / ALK PHOS: 92 U/L / ALT: 5 U/L / AST: 10 U/L / GGT: x                                 9.6    7.91  )-----------( 320      ( 28 Mar 2025 07:01 )             29.4     PT/INR - ( 26 Mar 2025 22:23 )   PT: 10.5 sec;   INR: <0.90 ratio         PTT - ( 26 Mar 2025 22:23 )  PTT:32.6 sec

## 2025-03-28 NOTE — PROGRESS NOTE ADULT - ASSESSMENT
55M PMH of DM2 (home med lantus 12 units qhs, last HgA1c 12 in 12/2024), gastric bypass, sent in by podiatrist Dr. Fernandez for progressively worsening left foot 2nd toe dry gangrene for admission without concerns for OM at this time. Vascular surgery consulted for evaluation. Denies claudications. Reports of multiple episodes of LE wounds, but healed on itself in the past. Palpable L DP/PT pulses on exam.    Recommendations:  - INDY/PVRs w/ toe pressure reviewed - R 115 and L 79 with good waveform. Podiatry can proceed with surgical intervention  - please contact vascular surgery if there are concerns during procedure   - Local wound care  - endocrine consult for diabetes management   - appreciate care per primary team   - please contact us with questions or concerns    Plan discussed with fellow on behalf of attending     Surgery C Team  Please page the team for any patient related questions or concerns. Pager number : [phone]

## 2025-03-28 NOTE — PROGRESS NOTE ADULT - ASSESSMENT
The patient is a 55y Male with PMH of DM who presented to the hospital with LE infection.  Endocrinology consulted for hyperglycemia    #Uncontrolled Type 2 Diabetes Mellitus with hyperglycemia and   #DKA, mild (resolving)  - Follows with: Dr. Hernandez  - A1C with Estimated Average Glucose Result: 16.7 % (03-26-25)  - home regimen: Lantus 12 units, (not compliant with Admelog moderate dose sliding scale; has dexcom G7 at home   - eGFR: 113 mL/min/1.73m2 (03-27-25)  - Weight (kg): 64 (03-27-25)  - glucose elevated, mild DKA on presentation showing improvement      INPATIENT PLAN:  - FS goal 100-180: FS above goal  - Increase Lantus to 16 units Q 6am (received 12 units this morning, first dose of increase will be tomorrow morning 3/29)  - Continue Admelog 6 units TID before meals (HOLD if NPO or not eating)  - Continue Low dose admelog correction scale TIDQAC and separate low dose scale QHS  - Please check FSG before meals and QHS, or q6h while NPO  - consistent carb diet when eating  - nutrition consult placed  - BHB still mildly elevated- 2.6 today- repeat in AM      DISCHARGE PLANNING:  - Discharge recs basal bolus insulin, doses pending clinical course  - will need Endocrinology follow up. Please provide clinic info in DC paperwork for patient to make an appointment:    #Hyperlipidemia  - LDL goal <70  - Last LDL: 132 mg/dL (12-16-24)  - check lipid panel   -recommend starting statin if no contraindications  -lipid panel yearly outpt    NOHEMY DavilaCNP-BC  Nurse Practitioner  Division of Endocrinology & Diabetes  Can be reached via Microsoft Teams.    For follow up questions, discharge recommendations or new consults, please email [email or web address] ([email or web address]) or [email or web address] ([facility]) or call the answering service at [phone] (weekdays); [phone] (nights/weekends).   For emergencies, please page fellow on call.

## 2025-03-28 NOTE — DIETITIAN INITIAL EVALUATION ADULT - PERTINENT MEDS FT
MEDICATIONS  (STANDING):  chlorhexidine 2% Cloths 1 Application(s) Topical daily  dextrose 5%. 1000 milliLiter(s) (100 mL/Hr) IV Continuous <Continuous>  dextrose 5%. 1000 milliLiter(s) (50 mL/Hr) IV Continuous <Continuous>  dextrose 50% Injectable 25 Gram(s) IV Push once  dextrose 50% Injectable 12.5 Gram(s) IV Push once  dextrose 50% Injectable 25 Gram(s) IV Push once  dextrose Oral Gel 15 Gram(s) Oral once  glucagon  Injectable 1 milliGRAM(s) IntraMuscular once  insulin lispro (ADMELOG) corrective regimen sliding scale   SubCutaneous three times a day before meals  insulin lispro (ADMELOG) corrective regimen sliding scale   SubCutaneous at bedtime  insulin lispro Injectable (ADMELOG) 6 Unit(s) SubCutaneous three times a day before meals  piperacillin/tazobactam IVPB.. 3.375 Gram(s) IV Intermittent every 8 hours  vancomycin  IVPB 1250 milliGRAM(s) IV Intermittent every 12 hours    MEDICATIONS  (PRN):  acetaminophen     Tablet .. 650 milliGRAM(s) Oral every 6 hours PRN Temp greater or equal to 38C (100.4F), Mild Pain (1 - 3)  aluminum hydroxide/magnesium hydroxide/simethicone Suspension 30 milliLiter(s) Oral every 4 hours PRN Dyspepsia  melatonin 6 milliGRAM(s) Oral at bedtime PRN Insomnia

## 2025-03-28 NOTE — PROGRESS NOTE ADULT - PROBLEM SELECTOR PLAN 1
L foot 2nd digit gangrene. L foot xray showed no gas, no osteomyelitis. Podiatry following; local wound care v. left foot partial 2nd ray resection pending vascular recs.    - c/w IV zosyn + vancomycin (cover for gram+ cocci gram- rods, anaerobes, MRSA)  - f/u INDY/PVR -> no occlusion/significant stenosis  - f/u blood cultures -> NG@24  - f/u vascular consult -> no contraindication to surgery

## 2025-03-28 NOTE — PROGRESS NOTE ADULT - PROBLEM SELECTOR PLAN 3
h/o gastric bypass for the sake of blood sugar control. Pt denies diarrhea, constipation, any GI sx.    - consult nutrition h/o gastric bypass for the sake of blood sugar control. Pt denies diarrhea, constipation, any GI sx. - DVT ppx: sq heparin

## 2025-03-28 NOTE — DIETITIAN INITIAL EVALUATION ADULT - PROBLEM SELECTOR PLAN 1
L foot 2nd digit gangrene. L foot xray showed no gas, no osteomyelitis. Podiatry following; local wound care v. left foot partial 2nd ray resection pending vascular recs.    - c/w IV zosyn + vancomycin (cover for gram+ cocci gram- rods, anaerobes, MRSA)  - f/u INDY/PVR  - f/u blood cultures  - f/u vascular consult

## 2025-03-28 NOTE — DIETITIAN INITIAL EVALUATION ADULT - OTHER INFO
Pt seen at bedside. Pt reported good PO intake in house. Pt s/p NPO. No intake reported per RN flowsheets. Continue to monitor and document PO in flowsheets. Patient denies difficulty chewing or swallowing at present. Pt amenable to Liquid Protein Supplement. No food preferences verbalized at this time. Pt denies any nausea, vomiting, diarrhea, or constipation at this time. Per Pt, last BM yesterday 3/27. Not noted to be on a bowel regimen. No edema, no PI/DTI noted, per RN flowsheet. Per chart, L foot 2nd digit gangrene resection pending. Labs noted for hyperglycemia and elevated HgA1c 16.7% (3/26). Pt educated on Consistent Carbohydrate diet. RDN answered questions/concerns related to diet. RDN remains available and will follow-up per protocol.

## 2025-03-28 NOTE — DIETITIAN INITIAL EVALUATION ADULT - PROBLEM SELECTOR PLAN 2
Pt with hx TD2M. Home med is 12 units lantis qhs. Self reports last A1C is 12 (December), was 16.7 in ED.   In ED, missed one dose of lantus, blood sugar was 720. Was given admelog 10 units at midnight, blood sugar dropped 720 to 564. Was then given 12 units lantus at 6am, blood sugar dropped to 373.     - f/u blood glucose and CMP, take off NPO once DKA resolves  - q4h ISS  - endocrinology following  - will need proper diabetes education  - trend K+

## 2025-03-28 NOTE — DIETITIAN INITIAL EVALUATION ADULT - ORAL INTAKE PTA/DIET HISTORY
Patient reports generally good appetite/PO intake PTA, consumes a regular diet at baseline. Pt confirms NKFA, food intolerance. Pt reported UBW 143lbs/65kg. Pt denies any recent weight changes. No multivitamins or oral nutritional supplements taken PTA.

## 2025-03-28 NOTE — DIETITIAN INITIAL EVALUATION ADULT - OTHER CALCULATIONS
Defer fluid needs to MD/Team.   Ideal Body Weight: 166lbs / 75.2kg +/-10%   unable to access HIE wt hx at this time.

## 2025-03-28 NOTE — DIETITIAN INITIAL EVALUATION ADULT - REASON FOR ADMISSION
Per chart, Pt is 54 y/o M with PMH of DM2, sent in by podiatrist Dr. 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 Admitted for management of L foot dry gangrene / evaluation for possible amputation, found to have mild DKA resolving s/p ademlog 10u and initiation of inpatient insulin regimen, receiving vanc and zosyn while awaiting left foot second partial ray resection with Dr. Ferro on 4/3.

## 2025-03-28 NOTE — PROGRESS NOTE ADULT - SUBJECTIVE AND OBJECTIVE BOX
Patient is a 55y old  Male who presents with a chief complaint of left foot gangrene (27 Mar 2025 16:45)      SUBJECTIVE / OVERNIGHT EVENTS:  Patient seen and evaluated at bedside.    Denies any fevers, chills, CP, or SOB.      REVIEW OF SYSTEMS:    CONSTITUTIONAL:  No weakness, fevers or chills  EYES/ENT:  No visual changes;  No vertigo or throat pain   NECK:  No pain or stiffness  RESPIRATORY:  No cough, wheezing, hemoptysis; No shortness of breath  CARDIOVASCULAR:  No chest pain or palpitations  GASTROINTESTINAL:  No abdominal or epigastric pain. No nausea, vomiting, or hematemesis; No diarrhea or constipation. No melena or hematochezia.  GENITOURINARY:  No dysuria, frequency or hematuria  NEUROLOGICAL:  No numbness or weakness  SKIN:  No itching, rashes        MEDICATIONS:  acetaminophen     Tablet .. 650 milliGRAM(s) Oral every 6 hours PRN Temp greater or equal to 38C (100.4F), Mild Pain (1 - 3)  aluminum hydroxide/magnesium hydroxide/simethicone Suspension 30 milliLiter(s) Oral every 4 hours PRN Dyspepsia  chlorhexidine 2% Cloths 1 Application(s) Topical daily  dextrose 5%. 1000 milliLiter(s) IV Continuous <Continuous>  dextrose 5%. 1000 milliLiter(s) IV Continuous <Continuous>  dextrose 50% Injectable 25 Gram(s) IV Push once  dextrose 50% Injectable 12.5 Gram(s) IV Push once  dextrose 50% Injectable 25 Gram(s) IV Push once  dextrose Oral Gel 15 Gram(s) Oral once  glucagon  Injectable 1 milliGRAM(s) IntraMuscular once  insulin glargine Injectable (LANTUS) 12 Unit(s) SubCutaneous <User Schedule>  insulin lispro (ADMELOG) corrective regimen sliding scale   SubCutaneous three times a day before meals  insulin lispro (ADMELOG) corrective regimen sliding scale   SubCutaneous at bedtime  insulin lispro Injectable (ADMELOG) 6 Unit(s) SubCutaneous three times a day before meals  melatonin 6 milliGRAM(s) Oral at bedtime PRN Insomnia  piperacillin/tazobactam IVPB.. 3.375 Gram(s) IV Intermittent every 8 hours  vancomycin  IVPB 1250 milliGRAM(s) IV Intermittent every 12 hours        Vital Signs Last 24 Hrs  T(C): 36.8 (28 Mar 2025 05:37), Max: 37 (27 Mar 2025 21:43)  T(F): 98.2 (28 Mar 2025 05:37), Max: 98.6 (27 Mar 2025 21:43)  HR: 76 (28 Mar 2025 05:37) (76 - 77)  BP: 146/77 (28 Mar 2025 05:37) (146/77 - 146/82)  BP(mean): --  RR: 17 (28 Mar 2025 05:37) (17 - 17)  SpO2: 98% (28 Mar 2025 05:37) (96% - 98%)    Parameters below as of 28 Mar 2025 05:37  Patient On (Oxygen Delivery Method): room air      PHYSICAL EXAM:  GENERAL: NAD, lying in bed comfortably, conversational  HEAD:  Atraumatic, normocephalic  EYES: EOMI, PERRLA, conjunctiva and sclera clear  NECK: Supple, trachea midline, no JVD  HEART: +S1/S2, RRR, no murmurs, rubs, or gallops  LUNGS: Unlabored respirations. CTA b/l, no crackles, wheezing, or rhonchi  ABDOMEN: Soft, NTND, +BS. No masses or hernia palpated  EXTREMITIES: 2+ peripheral pulses bilaterally. No clubbing, cyanosis, or edema  MSK: no gross deformity in extremities, no step off or deformity in C/T/L spine, normal muscle strength/tone  NERVOUS SYSTEM: CN II-XII intact, A&Ox3, moving all extremities spontaneously, no focal deficits, sensation intact and equal in b/l extremities  SKIN: No rashes or lesions      LABS: Patient is a 55y old  Male who presents with a chief complaint of left foot gangrene (27 Mar 2025 16:45)      SUBJECTIVE / OVERNIGHT EVENTS:  Patient seen and evaluated at bedside. No complaints overnight.      REVIEW OF SYSTEMS:  CONSTITUTIONAL:  No fevers or chills  RESPIRATORY:  No cough, wheezing; No shortness of breath  CARDIOVASCULAR:  No chest pain or palpitations  GASTROINTESTINAL:  No abdominal or epigastric pain.         MEDICATIONS:  acetaminophen     Tablet .. 650 milliGRAM(s) Oral every 6 hours PRN Temp greater or equal to 38C (100.4F), Mild Pain (1 - 3)  aluminum hydroxide/magnesium hydroxide/simethicone Suspension 30 milliLiter(s) Oral every 4 hours PRN Dyspepsia  chlorhexidine 2% Cloths 1 Application(s) Topical daily  dextrose 5%. 1000 milliLiter(s) IV Continuous <Continuous>  dextrose 5%. 1000 milliLiter(s) IV Continuous <Continuous>  dextrose 50% Injectable 25 Gram(s) IV Push once  dextrose 50% Injectable 12.5 Gram(s) IV Push once  dextrose 50% Injectable 25 Gram(s) IV Push once  dextrose Oral Gel 15 Gram(s) Oral once  glucagon  Injectable 1 milliGRAM(s) IntraMuscular once  insulin glargine Injectable (LANTUS) 12 Unit(s) SubCutaneous <User Schedule>  insulin lispro (ADMELOG) corrective regimen sliding scale   SubCutaneous three times a day before meals  insulin lispro (ADMELOG) corrective regimen sliding scale   SubCutaneous at bedtime  insulin lispro Injectable (ADMELOG) 6 Unit(s) SubCutaneous three times a day before meals  melatonin 6 milliGRAM(s) Oral at bedtime PRN Insomnia  piperacillin/tazobactam IVPB.. 3.375 Gram(s) IV Intermittent every 8 hours  vancomycin  IVPB 1250 milliGRAM(s) IV Intermittent every 12 hours        Vital Signs Last 24 Hrs  T(C): 36.8 (28 Mar 2025 05:37), Max: 37 (27 Mar 2025 21:43)  T(F): 98.2 (28 Mar 2025 05:37), Max: 98.6 (27 Mar 2025 21:43)  HR: 76 (28 Mar 2025 05:37) (76 - 77)  BP: 146/77 (28 Mar 2025 05:37) (146/77 - 146/82)  BP(mean): --  RR: 17 (28 Mar 2025 05:37) (17 - 17)  SpO2: 98% (28 Mar 2025 05:37) (96% - 98%)    Parameters below as of 28 Mar 2025 05:37  Patient On (Oxygen Delivery Method): room air      PHYSICAL EXAM:  GENERAL: NAD, lying in bed comfortably, conversational  HEART: +S1/S2, RRR  LUNGS: Unlabored respirations. CTA b/l  ABDOMEN: Soft, NTND, +BS  EXTREMITIES: 2+ peripheral pulses bilaterally, faint in b/l lower extremities.  MSK: no gross deformity in extremities, normal muscle strength/tone  NERVOUS SYSTEM: A&Ox3, moving all extremities spontaneously  SKIN: No rashes or lesions      LABS:                        9.6    7.91  )-----------( 320      ( 28 Mar 2025 07:01 )             29.4       03-28    136  |  99  |  8   ----------------------------<  264[H]  3.8   |  22  |  0.59    Ca    9.2      28 Mar 2025 07:01  Phos  3.0     03-28  Mg     1.80     03-28    TPro  6.7  /  Alb  3.0[L]  /  TBili  0.2  /  DBili  x   /  AST  10  /  ALT  5   /  AlkPhos  92  03-28        PT/INR - ( 26 Mar 2025 22:23 )   PT: 10.5 sec;   INR: <0.90 ratio    PTT - ( 26 Mar 2025 22:23 )  PTT:32.6 sec    Urinalysis Basic - ( 28 Mar 2025 07:01 )    Color: x / Appearance: x / SG: x / pH: x  Gluc: 264 mg/dL / Ketone: x  / Bili: x / Urobili: x   Blood: x / Protein: x / Nitrite: x   Leuk Esterase: x / RBC: x / WBC x   Sq Epi: x / Non Sq Epi: x / Bacteria: x          Urinalysis with Rflx Culture (collected 27 Mar 2025 00:28)    Culture - Blood (collected 26 Mar 2025 22:23)  Source: Blood Blood-Peripheral  Preliminary Report (28 Mar 2025 03:01):    No growth at 24 hours    Culture - Blood (collected 26 Mar 2025 22:10)  Source: Blood Blood-Peripheral  Preliminary Report (28 Mar 2025 03:01):    No growth at 24 hours        IMAGING: personally reviewed

## 2025-03-28 NOTE — PROGRESS NOTE ADULT - PROBLEM SELECTOR PLAN 2
Pt with hx TD2M. Home med is 12 units lantis qhs. Self reports last A1C is 12 (December), was 16.7 in ED.   In ED, missed one dose of lantus, blood sugar was 720. Was given admelog 10 units at midnight, blood sugar dropped 720 to 564. Was then given 12 units lantus at 6am, blood sugar dropped to 373.     - f/u blood glucose and CMP, take off NPO once DKA resolves  - q4h ISS  - endocrinology following  - will need proper diabetes education  - trend K+ Pt with hx TD2M. Home med is 12 units lantis qhs. Self reports last A1C is 12 (December), was 16.7 in ED.   In ED, missed one dose of lantus, blood sugar was 720. Was given admelog 10 units at midnight, blood sugar dropped 720 to 564. Was then given 12 units lantus at 6am, blood sugar dropped to 373.     - f/u blood glucose and CMP, take off NPO once DKA resolves  - 3/28: persistent BHB -> LR 2L @100/hr  - q4h ISS  - endocrinology following  - will need proper diabetes education  - trend K+ h/o gastric bypass for the sake of blood sugar control. Pt denies diarrhea, constipation, any GI sx.

## 2025-03-28 NOTE — PROGRESS NOTE ADULT - SUBJECTIVE AND OBJECTIVE BOX
Patient is a 55y old  Male who presents with a chief complaint of left foot gangrene (28 Mar 2025 09:09)       INTERVAL HPI/OVERNIGHT EVENTS:  Patient seen and evaluated at bedside.  Pt is resting comfortable in NAD. Denies N/V/F/C.      Allergies    No Known Allergies    Intolerances        Vital Signs Last 24 Hrs  T(C): 36.7 (28 Mar 2025 12:04), Max: 37 (27 Mar 2025 21:43)  T(F): 98.1 (28 Mar 2025 12:04), Max: 98.6 (27 Mar 2025 21:43)  HR: 76 (28 Mar 2025 12:04) (76 - 77)  BP: 151/89 (28 Mar 2025 12:04) (146/77 - 151/89)  BP(mean): --  RR: 18 (28 Mar 2025 12:04) (17 - 18)  SpO2: 99% (28 Mar 2025 12:04) (96% - 99%)    Parameters below as of 28 Mar 2025 12:04  Patient On (Oxygen Delivery Method): room air        LABS:                        9.6    7.91  )-----------( 320      ( 28 Mar 2025 07:01 )             29.4     03-28    136  |  99  |  8   ----------------------------<  264[H]  3.8   |  22  |  0.59    Ca    9.2      28 Mar 2025 07:01  Phos  3.0     03-28  Mg     1.80     03-28    TPro  6.7  /  Alb  3.0[L]  /  TBili  0.2  /  DBili  x   /  AST  10  /  ALT  5   /  AlkPhos  92  03-28    PT/INR - ( 26 Mar 2025 22:23 )   PT: 10.5 sec;   INR: <0.90 ratio         PTT - ( 26 Mar 2025 22:23 )  PTT:32.6 sec  Urinalysis Basic - ( 28 Mar 2025 07:01 )    Color: x / Appearance: x / SG: x / pH: x  Gluc: 264 mg/dL / Ketone: x  / Bili: x / Urobili: x   Blood: x / Protein: x / Nitrite: x   Leuk Esterase: x / RBC: x / WBC x   Sq Epi: x / Non Sq Epi: x / Bacteria: x      CAPILLARY BLOOD GLUCOSE      POCT Blood Glucose.: 266 mg/dL (28 Mar 2025 11:51)  POCT Blood Glucose.: 223 mg/dL (28 Mar 2025 08:23)  POCT Blood Glucose.: 246 mg/dL (28 Mar 2025 05:13)  POCT Blood Glucose.: 246 mg/dL (28 Mar 2025 01:19)  POCT Blood Glucose.: 227 mg/dL (27 Mar 2025 21:24)  POCT Blood Glucose.: 203 mg/dL (27 Mar 2025 17:06)  POCT Blood Glucose.: 204 mg/dL (27 Mar 2025 14:24)      Lower Extremity Physical Exam:  Vascular: DP/PT 0/4, B/L, CFT <3 seconds B/L, Temperature gradient warm to cool, B/L.   Neuro: Epicritic sensation diminished to the level of digits, B/L.  Musculoskeletal/Ortho: unremarkable  Skin: left foot 2nd digit full thickness dry gangrene, no wet changes, no malodor, no fluctuance, right foot posteromedial ankle wound to subQ, no drainage, no fluctuance, no malodor      RADIOLOGY & ADDITIONAL TESTS:

## 2025-03-28 NOTE — PROGRESS NOTE ADULT - ASSESSMENT
55M presents for left foot 2nd digit gangrene  -Pt was seen and evaluated  -Afebrile, WBC 14.25, ESR 81, CRP 11.80   -Left foot 2nd digit full thickness dry gangrene, no wet changes, no malodor, no fluctuance, right foot posteromedial ankle wound to subQ, no drainage, no fluctuance, no malodor  -Left foot X-ray: no gas, no OM   -No culture obtained 2/2 no signs of acute infection  - INDY/PVR: RABI 1.32 RTBI 0.73 LABI 1.18 LTBI 0.44   -Vascular recs appreciated, okay to proceed with podiatric surgery  -Pod Plan: left foot partial 2nd ray resection pending vascular recs  - Booked tentatively for left foot partial 2nd ray resection with Dr. Ferro on Thursday 4/3 at 3:30pm. Will inquire about earlier OR date given no need for vascular intervention.  -Please document medical clearance for possible podiatric surgery under anesthesia  -Discussed with attending    55M presents for left foot 2nd digit gangrene  -Pt was seen and evaluated  -Afebrile, WBC 17.91, ESR 81, CRP 11.80   -Left foot 2nd digit full thickness dry gangrene, no wet changes, no malodor, no fluctuance, right foot posteromedial ankle wound to subQ, no drainage, no fluctuance, no malodor  -Left foot X-ray: no gas, no OM   -No culture obtained 2/2 no signs of acute infection  - INDY/PVR: RABI 1.32 RTBI 0.73 LABI 1.18 LTBI 0.44   -Vascular recs appreciated, okay to proceed with podiatric surgery  -Pod Plan: left foot partial 2nd ray resection pending vascular recs  - Booked tentatively for left foot partial 2nd ray resection with Dr. Osuna on Wed 4/2 at 2.  -Please document medical clearance for possible podiatric surgery under anesthesia  -Discussed with attending    55M presents for left foot 2nd digit gangrene  -Pt was seen and evaluated  -Afebrile, WBC 7.91, ESR 81, CRP 11.80   -Left foot 2nd digit full thickness dry gangrene, no wet changes, no malodor, no fluctuance, right foot posteromedial ankle wound to subQ, no drainage, no fluctuance, no malodor  -Left foot X-ray: no gas, no OM   -No culture obtained 2/2 no signs of acute infection  - INDY/PVR: RABI 1.32 RTBI 0.73 LABI 1.18 LTBI 0.44   -Vascular recs appreciated, okay to proceed with podiatric surgery  -Pod Plan: left foot partial 2nd ray resection pending vascular recs  - Booked tentatively for left foot partial 2nd ray resection with Dr. Osuna on Wed 4/2 at 2.  -Please document medical clearance for possible podiatric surgery under anesthesia  -Discussed with attending

## 2025-03-28 NOTE — PROGRESS NOTE ADULT - SUBJECTIVE AND OBJECTIVE BOX
Chief Complaint: Hyperglycemia, DKA, found to have low cpeptide 0.7 glucose 250    History: saw pt at bedside. Tolerating PO diet. FS above goal.     MEDICATIONS  (STANDING):  chlorhexidine 2% Cloths 1 Application(s) Topical daily  dextrose 5%. 1000 milliLiter(s) (100 mL/Hr) IV Continuous <Continuous>  dextrose 5%. 1000 milliLiter(s) (50 mL/Hr) IV Continuous <Continuous>  dextrose 50% Injectable 25 Gram(s) IV Push once  dextrose 50% Injectable 12.5 Gram(s) IV Push once  dextrose 50% Injectable 25 Gram(s) IV Push once  dextrose Oral Gel 15 Gram(s) Oral once  glucagon  Injectable 1 milliGRAM(s) IntraMuscular once  insulin lispro (ADMELOG) corrective regimen sliding scale   SubCutaneous three times a day before meals  insulin lispro (ADMELOG) corrective regimen sliding scale   SubCutaneous at bedtime  insulin lispro Injectable (ADMELOG) 6 Unit(s) SubCutaneous three times a day before meals  piperacillin/tazobactam IVPB.. 3.375 Gram(s) IV Intermittent every 8 hours  vancomycin  IVPB 1250 milliGRAM(s) IV Intermittent every 12 hours    MEDICATIONS  (PRN):  acetaminophen     Tablet .. 650 milliGRAM(s) Oral every 6 hours PRN Temp greater or equal to 38C (100.4F), Mild Pain (1 - 3)  aluminum hydroxide/magnesium hydroxide/simethicone Suspension 30 milliLiter(s) Oral every 4 hours PRN Dyspepsia  melatonin 6 milliGRAM(s) Oral at bedtime PRN Insomnia      Allergies    No Known Allergies    Intolerances      Review of Systems:  Cardiovascular: No chest pain, palpitations  Respiratory: No SOB, no cough  GI: No nausea, vomiting, abdominal pain  : No dysuria  Endocrine: no polyuria, polydipsia      PHYSICAL EXAM:  VITALS: T(C): 36.7 (03-28-25 @ 12:04)  T(F): 98.1 (03-28-25 @ 12:04), Max: 98.6 (03-27-25 @ 21:43)  HR: 76 (03-28-25 @ 12:04) (76 - 77)  BP: 151/89 (03-28-25 @ 12:04) (146/77 - 151/89)  RR:  (17 - 18)  SpO2:  (96% - 99%)  Wt(kg): --  GENERAL: NAD, well-groomed, well-developed  EYES: No proptosis  HEENT:  Atraumatic, Normocephalic  RESPIRATORY: non labored breathing   CARDIOVASCULAR: Regular rate and rhythm  GI: Soft, nontender, non distended  PSYCH: Alert and oriented x 3, normal affect, normal mood     CAPILLARY BLOOD GLUCOSE      POCT Blood Glucose.: 266 mg/dL (28 Mar 2025 11:51)  POCT Blood Glucose.: 223 mg/dL (28 Mar 2025 08:23)  POCT Blood Glucose.: 246 mg/dL (28 Mar 2025 05:13)  POCT Blood Glucose.: 246 mg/dL (28 Mar 2025 01:19)  POCT Blood Glucose.: 227 mg/dL (27 Mar 2025 21:24)  POCT Blood Glucose.: 203 mg/dL (27 Mar 2025 17:06)  POCT Blood Glucose.: 204 mg/dL (27 Mar 2025 14:24)      03-28    136  |  99  |  8   ----------------------------<  264[H]  3.8   |  22  |  0.59    eGFR: 116    Ca    9.2      03-28  Mg     1.80     03-28  Phos  3.0     03-28    TPro  6.7  /  Alb  3.0[L]  /  TBili  0.2  /  DBili  x   /  AST  10  /  ALT  5   /  AlkPhos  92  03-28          Thyroid Function Tests:        A1C with Estimated Average Glucose Result: 16.7 % (03-26-25 @ 22:23)  A1C with Estimated Average Glucose Result: 12.5 % (12-14-24 @ 00:48)          Diet, Consistent Carbohydrate/No Snacks (03-27-25 @ 15:44)

## 2025-03-28 NOTE — DIETITIAN INITIAL EVALUATION ADULT - ADD RECOMMEND
- Continue current diet order, which remains appropriate at this time.   - Recommend Liquid Protein Supplement 30mL, 2x daily (100kcal/15gm prot/serving).  - Consider MVI and Vitamin C, pending no medical contraindications, for micronutrient support.   - Please consistently document % PO intake in nursing flowsheets to assess adequacy of nutritional intake/monitor need for further nutritional intervention(s).   - Monitor weights, diet tolerance, skin integrity, BMs, pertinent labs.  - RDN services remain available as needed.

## 2025-03-28 NOTE — PROGRESS NOTE ADULT - ASSESSMENT
54 y/o M with PMH of DM2, sent in by podiatrist Dr. 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 Admitted for management of L foot dry gangrene / evaluation for possible amputation, found to have mild DKA resolving s/p ademlog 10u and initiation of inpatient insulin regimen, receiving vanc and zosyn while awaiting left foot second partial ray resection with Dr. Ferro on 4/3.

## 2025-03-28 NOTE — DIETITIAN INITIAL EVALUATION ADULT - PERTINENT LABORATORY DATA
03-28    137  |  100  |  x   ----------------------------<  x   4.1   |  x   |  x     Ca    9.2      28 Mar 2025 07:01  Phos  3.3     03-28  Mg     1.80     03-28    TPro  6.7  /  Alb  3.0[L]  /  TBili  0.2  /  DBili  x   /  AST  10  /  ALT  5   /  AlkPhos  92  03-28  CAPILLARY BLOOD GLUCOSE  POCT Blood Glucose.: 266 mg/dL (28 Mar 2025 11:51)  POCT Blood Glucose.: 223 mg/dL (28 Mar 2025 08:23)  POCT Blood Glucose.: 246 mg/dL (28 Mar 2025 05:13)  POCT Blood Glucose.: 246 mg/dL (28 Mar 2025 01:19)  POCT Blood Glucose.: 227 mg/dL (27 Mar 2025 21:24)  POCT Blood Glucose.: 203 mg/dL (27 Mar 2025 17:06)    A1C with Estimated Average Glucose Result: 16.7 % (03-26-25 @ 22:23)  A1C with Estimated Average Glucose Result: 12.5 % (12-14-24 @ 00:48)

## 2025-03-29 LAB
ALBUMIN SERPL ELPH-MCNC: 3 G/DL — LOW (ref 3.3–5)
ALP SERPL-CCNC: 96 U/L — SIGNIFICANT CHANGE UP (ref 40–120)
ALT FLD-CCNC: 8 U/L — SIGNIFICANT CHANGE UP (ref 4–41)
ANION GAP SERPL CALC-SCNC: 12 MMOL/L — SIGNIFICANT CHANGE UP (ref 7–14)
ANION GAP SERPL CALC-SCNC: 17 MMOL/L — HIGH (ref 7–14)
AST SERPL-CCNC: 10 U/L — SIGNIFICANT CHANGE UP (ref 4–40)
B-OH-BUTYR SERPL-SCNC: 0.4 MMOL/L — SIGNIFICANT CHANGE UP (ref 0–0.4)
B-OH-BUTYR SERPL-SCNC: 3.2 MMOL/L — HIGH (ref 0–0.4)
BASOPHILS # BLD AUTO: 0.06 K/UL — SIGNIFICANT CHANGE UP (ref 0–0.2)
BASOPHILS NFR BLD AUTO: 0.8 % — SIGNIFICANT CHANGE UP (ref 0–2)
BILIRUB SERPL-MCNC: 0.2 MG/DL — SIGNIFICANT CHANGE UP (ref 0.2–1.2)
BUN SERPL-MCNC: 14 MG/DL — SIGNIFICANT CHANGE UP (ref 7–23)
BUN SERPL-MCNC: 16 MG/DL — SIGNIFICANT CHANGE UP (ref 7–23)
CALCIUM SERPL-MCNC: 9.2 MG/DL — SIGNIFICANT CHANGE UP (ref 8.4–10.5)
CALCIUM SERPL-MCNC: 9.2 MG/DL — SIGNIFICANT CHANGE UP (ref 8.4–10.5)
CHLORIDE SERPL-SCNC: 97 MMOL/L — LOW (ref 98–107)
CHLORIDE SERPL-SCNC: 97 MMOL/L — LOW (ref 98–107)
CO2 SERPL-SCNC: 22 MMOL/L — SIGNIFICANT CHANGE UP (ref 22–31)
CO2 SERPL-SCNC: 25 MMOL/L — SIGNIFICANT CHANGE UP (ref 22–31)
CREAT SERPL-MCNC: 0.63 MG/DL — SIGNIFICANT CHANGE UP (ref 0.5–1.3)
CREAT SERPL-MCNC: 0.71 MG/DL — SIGNIFICANT CHANGE UP (ref 0.5–1.3)
EGFR: 110 ML/MIN/1.73M2 — SIGNIFICANT CHANGE UP
EGFR: 110 ML/MIN/1.73M2 — SIGNIFICANT CHANGE UP
EGFR: 114 ML/MIN/1.73M2 — SIGNIFICANT CHANGE UP
EGFR: 114 ML/MIN/1.73M2 — SIGNIFICANT CHANGE UP
EOSINOPHIL # BLD AUTO: 0.1 K/UL — SIGNIFICANT CHANGE UP (ref 0–0.5)
EOSINOPHIL NFR BLD AUTO: 1.4 % — SIGNIFICANT CHANGE UP (ref 0–6)
GAS PNL BLDV: SIGNIFICANT CHANGE UP
GLUCOSE BLDC GLUCOMTR-MCNC: 136 MG/DL — HIGH (ref 70–99)
GLUCOSE BLDC GLUCOMTR-MCNC: 163 MG/DL — HIGH (ref 70–99)
GLUCOSE BLDC GLUCOMTR-MCNC: 164 MG/DL — HIGH (ref 70–99)
GLUCOSE BLDC GLUCOMTR-MCNC: 249 MG/DL — HIGH (ref 70–99)
GLUCOSE BLDC GLUCOMTR-MCNC: 259 MG/DL — HIGH (ref 70–99)
GLUCOSE BLDC GLUCOMTR-MCNC: 306 MG/DL — HIGH (ref 70–99)
GLUCOSE BLDC GLUCOMTR-MCNC: 322 MG/DL — HIGH (ref 70–99)
GLUCOSE SERPL-MCNC: 174 MG/DL — HIGH (ref 70–99)
GLUCOSE SERPL-MCNC: 305 MG/DL — HIGH (ref 70–99)
HCT VFR BLD CALC: 30.6 % — LOW (ref 39–50)
HGB BLD-MCNC: 9.9 G/DL — LOW (ref 13–17)
IANC: 4.96 K/UL — SIGNIFICANT CHANGE UP (ref 1.8–7.4)
IMM GRANULOCYTES NFR BLD AUTO: 0.1 % — SIGNIFICANT CHANGE UP (ref 0–0.9)
LYMPHOCYTES # BLD AUTO: 1.46 K/UL — SIGNIFICANT CHANGE UP (ref 1–3.3)
LYMPHOCYTES # BLD AUTO: 20.5 % — SIGNIFICANT CHANGE UP (ref 13–44)
MAGNESIUM SERPL-MCNC: 1.7 MG/DL — SIGNIFICANT CHANGE UP (ref 1.6–2.6)
MAGNESIUM SERPL-MCNC: 1.7 MG/DL — SIGNIFICANT CHANGE UP (ref 1.6–2.6)
MCHC RBC-ENTMCNC: 27.2 PG — SIGNIFICANT CHANGE UP (ref 27–34)
MCHC RBC-ENTMCNC: 32.4 G/DL — SIGNIFICANT CHANGE UP (ref 32–36)
MCV RBC AUTO: 84.1 FL — SIGNIFICANT CHANGE UP (ref 80–100)
MONOCYTES # BLD AUTO: 0.53 K/UL — SIGNIFICANT CHANGE UP (ref 0–0.9)
MONOCYTES NFR BLD AUTO: 7.4 % — SIGNIFICANT CHANGE UP (ref 2–14)
NEUTROPHILS # BLD AUTO: 4.96 K/UL — SIGNIFICANT CHANGE UP (ref 1.8–7.4)
NEUTROPHILS NFR BLD AUTO: 69.8 % — SIGNIFICANT CHANGE UP (ref 43–77)
NRBC # BLD AUTO: 0 K/UL — SIGNIFICANT CHANGE UP (ref 0–0)
NRBC # FLD: 0 K/UL — SIGNIFICANT CHANGE UP (ref 0–0)
NRBC BLD AUTO-RTO: 0 /100 WBCS — SIGNIFICANT CHANGE UP (ref 0–0)
PHOSPHATE SERPL-MCNC: 2.8 MG/DL — SIGNIFICANT CHANGE UP (ref 2.5–4.5)
PHOSPHATE SERPL-MCNC: 3.2 MG/DL — SIGNIFICANT CHANGE UP (ref 2.5–4.5)
PLATELET # BLD AUTO: 340 K/UL — SIGNIFICANT CHANGE UP (ref 150–400)
POTASSIUM SERPL-MCNC: 3.6 MMOL/L — SIGNIFICANT CHANGE UP (ref 3.5–5.3)
POTASSIUM SERPL-MCNC: 4.1 MMOL/L — SIGNIFICANT CHANGE UP (ref 3.5–5.3)
POTASSIUM SERPL-SCNC: 3.6 MMOL/L — SIGNIFICANT CHANGE UP (ref 3.5–5.3)
POTASSIUM SERPL-SCNC: 4.1 MMOL/L — SIGNIFICANT CHANGE UP (ref 3.5–5.3)
PROT SERPL-MCNC: 6.9 G/DL — SIGNIFICANT CHANGE UP (ref 6–8.3)
RBC # BLD: 3.64 M/UL — LOW (ref 4.2–5.8)
RBC # FLD: 12.5 % — SIGNIFICANT CHANGE UP (ref 10.3–14.5)
SODIUM SERPL-SCNC: 134 MMOL/L — LOW (ref 135–145)
SODIUM SERPL-SCNC: 136 MMOL/L — SIGNIFICANT CHANGE UP (ref 135–145)
WBC # BLD: 7.12 K/UL — SIGNIFICANT CHANGE UP (ref 3.8–10.5)
WBC # FLD AUTO: 7.12 K/UL — SIGNIFICANT CHANGE UP (ref 3.8–10.5)

## 2025-03-29 PROCEDURE — 99232 SBSQ HOSP IP/OBS MODERATE 35: CPT

## 2025-03-29 PROCEDURE — 99232 SBSQ HOSP IP/OBS MODERATE 35: CPT | Mod: GC

## 2025-03-29 RX ORDER — PIPERACILLIN-TAZO-DEXTROSE,ISO 3.375G/5
3.38 IV SOLUTION, PIGGYBACK PREMIX FROZEN(ML) INTRAVENOUS EVERY 8 HOURS
Refills: 0 | Status: DISCONTINUED | OUTPATIENT
Start: 2025-03-29 | End: 2025-03-31

## 2025-03-29 RX ORDER — INSULIN LISPRO 100 U/ML
INJECTION, SOLUTION INTRAVENOUS; SUBCUTANEOUS AT BEDTIME
Refills: 0 | Status: DISCONTINUED | OUTPATIENT
Start: 2025-03-29 | End: 2025-04-03

## 2025-03-29 RX ORDER — VANCOMYCIN HCL IN 5 % DEXTROSE 1.5G/250ML
1750 PLASTIC BAG, INJECTION (ML) INTRAVENOUS EVERY 12 HOURS
Refills: 0 | Status: DISCONTINUED | OUTPATIENT
Start: 2025-03-29 | End: 2025-03-29

## 2025-03-29 RX ORDER — INSULIN LISPRO 100 U/ML
INJECTION, SOLUTION INTRAVENOUS; SUBCUTANEOUS
Refills: 0 | Status: DISCONTINUED | OUTPATIENT
Start: 2025-03-29 | End: 2025-04-03

## 2025-03-29 RX ORDER — INSULIN LISPRO 100 U/ML
INJECTION, SOLUTION INTRAVENOUS; SUBCUTANEOUS
Refills: 0 | Status: DISCONTINUED | OUTPATIENT
Start: 2025-03-29 | End: 2025-03-29

## 2025-03-29 RX ORDER — INSULIN LISPRO 100 U/ML
4 INJECTION, SOLUTION INTRAVENOUS; SUBCUTANEOUS ONCE
Refills: 0 | Status: COMPLETED | OUTPATIENT
Start: 2025-03-29 | End: 2025-03-29

## 2025-03-29 RX ORDER — INSULIN LISPRO 100 U/ML
7 INJECTION, SOLUTION INTRAVENOUS; SUBCUTANEOUS
Refills: 0 | Status: DISCONTINUED | OUTPATIENT
Start: 2025-03-29 | End: 2025-03-31

## 2025-03-29 RX ORDER — INSULIN GLARGINE-YFGN 100 [IU]/ML
4 INJECTION, SOLUTION SUBCUTANEOUS ONCE
Refills: 0 | Status: COMPLETED | OUTPATIENT
Start: 2025-03-29 | End: 2025-03-29

## 2025-03-29 RX ORDER — INSULIN GLARGINE-YFGN 100 [IU]/ML
20 INJECTION, SOLUTION SUBCUTANEOUS
Refills: 0 | Status: DISCONTINUED | OUTPATIENT
Start: 2025-03-30 | End: 2025-03-30

## 2025-03-29 RX ORDER — VANCOMYCIN HCL IN 5 % DEXTROSE 1.5G/250ML
1250 PLASTIC BAG, INJECTION (ML) INTRAVENOUS EVERY 12 HOURS
Refills: 0 | Status: DISCONTINUED | OUTPATIENT
Start: 2025-03-29 | End: 2025-03-31

## 2025-03-29 RX ORDER — ATORVASTATIN CALCIUM 80 MG/1
20 TABLET, FILM COATED ORAL AT BEDTIME
Refills: 0 | Status: DISCONTINUED | OUTPATIENT
Start: 2025-03-29 | End: 2025-04-03

## 2025-03-29 RX ORDER — SODIUM CHLORIDE 9 G/1000ML
1000 INJECTION, SOLUTION INTRAVENOUS
Refills: 0 | Status: DISCONTINUED | OUTPATIENT
Start: 2025-03-29 | End: 2025-03-30

## 2025-03-29 RX ORDER — VANCOMYCIN HCL IN 5 % DEXTROSE 1.5G/250ML
1500 PLASTIC BAG, INJECTION (ML) INTRAVENOUS EVERY 12 HOURS
Refills: 0 | Status: DISCONTINUED | OUTPATIENT
Start: 2025-03-29 | End: 2025-03-29

## 2025-03-29 RX ORDER — SODIUM CHLORIDE 9 G/1000ML
1000 INJECTION, SOLUTION INTRAVENOUS
Refills: 0 | Status: COMPLETED | OUTPATIENT
Start: 2025-03-29 | End: 2025-03-29

## 2025-03-29 RX ADMIN — INSULIN LISPRO 1: 100 INJECTION, SOLUTION INTRAVENOUS; SUBCUTANEOUS at 17:21

## 2025-03-29 RX ADMIN — INSULIN LISPRO 7 UNIT(S): 100 INJECTION, SOLUTION INTRAVENOUS; SUBCUTANEOUS at 14:16

## 2025-03-29 RX ADMIN — INSULIN LISPRO 6 UNIT(S): 100 INJECTION, SOLUTION INTRAVENOUS; SUBCUTANEOUS at 08:46

## 2025-03-29 RX ADMIN — Medication 25 GRAM(S): at 05:30

## 2025-03-29 RX ADMIN — SODIUM CHLORIDE 100 MILLILITER(S): 9 INJECTION, SOLUTION INTRAVENOUS at 10:40

## 2025-03-29 RX ADMIN — Medication 25 GRAM(S): at 21:20

## 2025-03-29 RX ADMIN — Medication 1 APPLICATION(S): at 14:17

## 2025-03-29 RX ADMIN — INSULIN LISPRO 4 UNIT(S): 100 INJECTION, SOLUTION INTRAVENOUS; SUBCUTANEOUS at 10:42

## 2025-03-29 RX ADMIN — MUPIROCIN CALCIUM 1 APPLICATION(S): 20 CREAM TOPICAL at 17:23

## 2025-03-29 RX ADMIN — INSULIN LISPRO 7 UNIT(S): 100 INJECTION, SOLUTION INTRAVENOUS; SUBCUTANEOUS at 17:21

## 2025-03-29 RX ADMIN — INSULIN GLARGINE-YFGN 4 UNIT(S): 100 INJECTION, SOLUTION SUBCUTANEOUS at 10:41

## 2025-03-29 RX ADMIN — ATORVASTATIN CALCIUM 20 MILLIGRAM(S): 80 TABLET, FILM COATED ORAL at 21:21

## 2025-03-29 RX ADMIN — INSULIN LISPRO 4: 100 INJECTION, SOLUTION INTRAVENOUS; SUBCUTANEOUS at 08:47

## 2025-03-29 RX ADMIN — Medication 25 GRAM(S): at 14:08

## 2025-03-29 RX ADMIN — Medication 166.67 MILLIGRAM(S): at 18:02

## 2025-03-29 RX ADMIN — Medication 300 MILLIGRAM(S): at 05:30

## 2025-03-29 RX ADMIN — MUPIROCIN CALCIUM 1 APPLICATION(S): 20 CREAM TOPICAL at 05:32

## 2025-03-29 RX ADMIN — INSULIN GLARGINE-YFGN 16 UNIT(S): 100 INJECTION, SOLUTION SUBCUTANEOUS at 06:03

## 2025-03-29 NOTE — PROGRESS NOTE ADULT - SUBJECTIVE AND OBJECTIVE BOX
PROGRESS NOTE:   Authored by Dr. Breanne Chau MD (PGY-2). Pager [facility] [phone] / LUIS ( [id number]) or via TEAMS    Patient is a 55y old  Male who presents with a chief complaint of Per chart, Pt is 54 y/o M with PMH of DM2, sent in by podiatrist Dr. 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 Admitted for management of L foot dry gangrene / evaluation for possible amputation, found to have mild DKA resolving s/p ademlog 10u and initiation of inpatient insulin regimen, receiving vanc and zosyn while awaiting left foot second partial ray resection with Dr. Ferro on 4/3.       (28 Mar 2025 15:32)      SUBJECTIVE / OVERNIGHT EVENTS:  No acute events overnight.     ADDITIONAL REVIEW OF SYSTEMS:  Patient denies fevers, chills, chest pain, shortness of breath, nausea, abdominal pain, diarrhea, constipation, dysuria, leg swelling, headache, light headedness.    MEDICATIONS  (STANDING):  chlorhexidine 2% Cloths 1 Application(s) Topical daily  dextrose 5%. 1000 milliLiter(s) (100 mL/Hr) IV Continuous <Continuous>  dextrose 5%. 1000 milliLiter(s) (50 mL/Hr) IV Continuous <Continuous>  dextrose 50% Injectable 25 Gram(s) IV Push once  dextrose 50% Injectable 12.5 Gram(s) IV Push once  dextrose 50% Injectable 25 Gram(s) IV Push once  dextrose Oral Gel 15 Gram(s) Oral once  glucagon  Injectable 1 milliGRAM(s) IntraMuscular once  insulin glargine Injectable (LANTUS) 16 Unit(s) SubCutaneous <User Schedule>  insulin lispro (ADMELOG) corrective regimen sliding scale   SubCutaneous three times a day before meals  insulin lispro (ADMELOG) corrective regimen sliding scale   SubCutaneous at bedtime  insulin lispro Injectable (ADMELOG) 6 Unit(s) SubCutaneous three times a day before meals  mupirocin 2% Ointment 1 Application(s) Topical two times a day  piperacillin/tazobactam IVPB.. 3.375 Gram(s) IV Intermittent every 8 hours  vancomycin  IVPB 1750 milliGRAM(s) IV Intermittent every 12 hours    MEDICATIONS  (PRN):  acetaminophen     Tablet .. 650 milliGRAM(s) Oral every 6 hours PRN Temp greater or equal to 38C (100.4F), Mild Pain (1 - 3)  aluminum hydroxide/magnesium hydroxide/simethicone Suspension 30 milliLiter(s) Oral every 4 hours PRN Dyspepsia  melatonin 6 milliGRAM(s) Oral at bedtime PRN Insomnia      CAPILLARY BLOOD GLUCOSE      POCT Blood Glucose.: 322 mg/dL (29 Mar 2025 06:02)  POCT Blood Glucose.: 281 mg/dL (28 Mar 2025 21:29)  POCT Blood Glucose.: 307 mg/dL (28 Mar 2025 17:11)  POCT Blood Glucose.: 266 mg/dL (28 Mar 2025 11:51)  POCT Blood Glucose.: 223 mg/dL (28 Mar 2025 08:23)    I&O's Summary    28 Mar 2025 07:01  -  29 Mar 2025 07:00  --------------------------------------------------------  IN: 0 mL / OUT: 1100 mL / NET: -1100 mL        PHYSICAL EXAM:  Vital Signs Last 24 Hrs  T(C): 36.7 (29 Mar 2025 05:14), Max: 36.7 (28 Mar 2025 12:04)  T(F): 98.1 (29 Mar 2025 05:14), Max: 98.1 (28 Mar 2025 12:04)  HR: 72 (29 Mar 2025 05:14) (72 - 76)  BP: 140/79 (29 Mar 2025 05:14) (140/79 - 173/96)  BP(mean): --  RR: 18 (29 Mar 2025 05:14) (18 - 18)  SpO2: 97% (29 Mar 2025 05:14) (97% - 99%)    Parameters below as of 29 Mar 2025 05:14  Patient On (Oxygen Delivery Method): room air        CONSTITUTIONAL: NAD, well-developed  RESPIRATORY: Normal respiratory effort; lungs are clear to auscultation bilaterally  CARDIOVASCULAR: Regular rate and rhythm, normal S1 and S2, no murmur/rub/gallop; No lower extremity edema; Peripheral pulses are 2+ bilaterally  ABDOMEN: Nontender to palpation, normoactive bowel sounds, no rebound/guarding; No hepatosplenomegaly  MSK: no clubbing or cyanosis of digits; no joint swelling or tenderness to palpation  PSYCH: A+O to person, place, and time; affect appropriate    LABS:                        9.6    7.91  )-----------( 320      ( 28 Mar 2025 07:01 )             29.4     03-28    137  |  100  |  11  ----------------------------<  229[H]  4.1   |  27  |  0.80    Ca    9.5      28 Mar 2025 14:43  Phos  3.3     03-28  Mg     1.80     03-28    TPro  6.7  /  Alb  3.0[L]  /  TBili  0.2  /  DBili  x   /  AST  10  /  ALT  5   /  AlkPhos  92  03-28          Urinalysis Basic - ( 28 Mar 2025 14:43 )    Color: x / Appearance: x / SG: x / pH: x  Gluc: 229 mg/dL / Ketone: x  / Bili: x / Urobili: x   Blood: x / Protein: x / Nitrite: x   Leuk Esterase: x / RBC: x / WBC x   Sq Epi: x / Non Sq Epi: x / Bacteria: x        Urinalysis with Rflx Culture (collected 27 Mar 2025 00:28)    Culture - Blood (collected 26 Mar 2025 22:23)  Source: Blood Blood-Peripheral  Preliminary Report (29 Mar 2025 03:01):    No growth at 48 Hours    Culture - Blood (collected 26 Mar 2025 22:10)  Source: Blood Blood-Peripheral  Preliminary Report (29 Mar 2025 03:01):    No growth at 48 Hours        Tele Reviewed:    RADIOLOGY & ADDITIONAL TESTS:  Results Reviewed:   Imaging Personally Reviewed:  Electrocardiogram Personally Reviewed:

## 2025-03-29 NOTE — PROGRESS NOTE ADULT - PROBLEM SELECTOR PLAN 2
h/o gastric bypass for the sake of blood sugar control. Pt denies diarrhea, constipation, any GI sx.

## 2025-03-29 NOTE — PROGRESS NOTE ADULT - ASSESSMENT
The patient is a 55y Male with PMH of DM who presented to the hospital with LE infection.  Endocrinology consulted for hyperglycemia    #Uncontrolled Type 2 Diabetes Mellitus with hyperglycemia and   #DKA, mild (resolving)  - Follows with: Dr. Hernandez  - A1C with Estimated Average Glucose Result: 16.7 % (03-26-25)  - home regimen: Lantus 12 units (3/29: pt states he want non-adherent with Lantus as he was concerned about the side effect of insulin. Pt educated on importance of skipping medication and DM complications), not compliant with Admelog moderate dose sliding scale; has dexcom G7 at home   - eGFR: 113 mL/min/1.73m2 (03-27-25)  - Weight (kg): 64 (03-27-25)  - glucose elevated, mild DKA on presentation showing improvement      INPATIENT PLAN:  - FS goal 100-180: FS above goal. 300s this AM additional 4 units of Admelog and additional 4 units of Lantus given.   - Pt denies eating i n between meals   - Increase Lantus to 20 units at 8AM (received 16 units this morning, additional 4 units for hyperglycemia)  - Increase Admelog to 7 units TID before meals (HOLD if NPO or not eating)  - Increase to moderate dose admelog correction scale TIDQAC for hyperglycemia now will change back to Low Admelog correction scale TID AC starting at dinner  - Continue with Ademlog low dose correction scale QHS  - Please check FSG before meals and QHS, or q6h while NPO; check 3 am FS   - consistent carb diet when eating  - nutrition consult placed  - Hypoglycemia Protocol   - Educated patient to please wait for FS prior to eating and to avoid eating between meals.  - BHB normal today (0.4 on 3/29 @ 12:13). No need to trend BHB unless bicard or pH is abnormal.       DISCHARGE PLANNING:  - Discharge recs basal bolus insulin, doses pending clinical course  - will need Endocrinology follow up. Please provide clinic info in DC paperwork for patient to make an appointment:    #Hyperlipidemia  - LDL goal <70  - Last LDL: 132 mg/dL (12-16-24)  - check lipid panel   -recommend starting statin if no contraindications  -lipid panel yearly outpt  - defer management to primary team    D/w Resident     Loulou Means  Nurse Practitioner  Division of Endocrinology & Diabetes  Available Microsoft Teams    If before 9AM or after 6PM, or on weekends/holidays, please call endocrine answering service for assistance ([phone]).For nonurgent matters email [email or web address] for assistance.

## 2025-03-29 NOTE — PROGRESS NOTE ADULT - PROBLEM SELECTOR PLAN 3
- DVT ppx: sq heparin - initially elevated glucoses and BHB NOW RESOLVED however still with persistent elevation in BHB, received 2 liters LR on 3/28 with resolution of BHB  - will trial 1L LR on 3/29- re-check BHB  - Endocrine following on lantus 16, premeal 6 and slide scale

## 2025-03-29 NOTE — PROGRESS NOTE ADULT - SUBJECTIVE AND OBJECTIVE BOX
Chief Complaint: DM2 with mild DKA (now resolved)    History: Pt seen at bedside. Spoke with patient in Qatari. Pt reports an adequate appetite. Pt tolerating oral diet. Pt denies nausea and vomiting/any signs of hypoglycemia. Snacks (pistachios and macadamia nuts) at bedside.     MEDICATIONS  (STANDING):  atorvastatin 20 milliGRAM(s) Oral at bedtime  chlorhexidine 2% Cloths 1 Application(s) Topical daily  dextrose 5%. 1000 milliLiter(s) (100 mL/Hr) IV Continuous <Continuous>  dextrose 5%. 1000 milliLiter(s) (50 mL/Hr) IV Continuous <Continuous>  dextrose 50% Injectable 25 Gram(s) IV Push once  dextrose 50% Injectable 12.5 Gram(s) IV Push once  dextrose 50% Injectable 25 Gram(s) IV Push once  dextrose Oral Gel 15 Gram(s) Oral once  glucagon  Injectable 1 milliGRAM(s) IntraMuscular once  insulin lispro (ADMELOG) corrective regimen sliding scale   SubCutaneous three times a day before meals  insulin lispro (ADMELOG) corrective regimen sliding scale   SubCutaneous at bedtime  insulin lispro Injectable (ADMELOG) 7 Unit(s) SubCutaneous three times a day before meals  lactated ringers. 1000 milliLiter(s) (100 mL/Hr) IV Continuous <Continuous>  mupirocin 2% Ointment 1 Application(s) Topical two times a day  piperacillin/tazobactam IVPB.. 3.375 Gram(s) IV Intermittent every 8 hours  vancomycin  IVPB 1250 milliGRAM(s) IV Intermittent every 12 hours    MEDICATIONS  (PRN):  acetaminophen     Tablet .. 650 milliGRAM(s) Oral every 6 hours PRN Temp greater or equal to 38C (100.4F), Mild Pain (1 - 3)  aluminum hydroxide/magnesium hydroxide/simethicone Suspension 30 milliLiter(s) Oral every 4 hours PRN Dyspepsia  melatonin 6 milliGRAM(s) Oral at bedtime PRN Insomnia      Allergies: No Known Allergies      Review of Systems:  Respiratory: No SOB, no cough  GI: No nausea, vomiting, abdominal pain  Endocrine: no polyuria, polydipsia      PHYSICAL EXAM:  VITALS: T(C): 36.7 (03-29-25 @ 12:54)  T(F): 98.1 (03-29-25 @ 12:54), Max: 98.1 (03-29-25 @ 05:14)  HR: 72 (03-29-25 @ 12:54) (72 - 76)  BP: 158/88 (03-29-25 @ 12:54) (140/79 - 173/96)  RR:  (18 - 18)  SpO2:  (97% - 99%)  Wt(kg): --  RESPIRATORY: No labored breathing  GI: Soft, nontender, non distended  PSYCH: Alert and oriented x 3, normal affect, normal mood      CAPILLARY BLOOD GLUCOSE  POCT Blood Glucose.: 136 mg/dL (29 Mar 2025 14:04)  POCT Blood Glucose.: 163 mg/dL (29 Mar 2025 12:06)  POCT Blood Glucose.: 259 mg/dL (29 Mar 2025 10:30)  POCT Blood Glucose.: 306 mg/dL (29 Mar 2025 08:34)  POCT Blood Glucose.: 322 mg/dL (29 Mar 2025 06:02)  POCT Blood Glucose.: 281 mg/dL (28 Mar 2025 21:29)  POCT Blood Glucose.: 307 mg/dL (28 Mar 2025 17:11)      A1C with Estimated Average Glucose (03.26.25 @ 22:23)  A1C with Estimated Average Glucose Result: 16.7      03-29    134[L]  |  97[L]  |  14  ----------------------------<  174[H]  3.6   |  25  |  0.63    eGFR: 114    Ca    9.2      03-29  Mg     1.70     03-29  Phos  2.8     03-29    TPro  6.9  /  Alb  3.0[L]  /  TBili  0.2  /  DBili  x   /  AST  10  /  ALT  8   /  AlkPhos  96  03-29      Diet, Consistent Carbohydrate/No Snacks (03-27-25 @ 15:44) [Active]

## 2025-03-30 LAB
ALBUMIN SERPL ELPH-MCNC: 3 G/DL — LOW (ref 3.3–5)
ALP SERPL-CCNC: 102 U/L — SIGNIFICANT CHANGE UP (ref 40–120)
ALT FLD-CCNC: 7 U/L — SIGNIFICANT CHANGE UP (ref 4–41)
ANION GAP SERPL CALC-SCNC: 13 MMOL/L — SIGNIFICANT CHANGE UP (ref 7–14)
AST SERPL-CCNC: 20 U/L — SIGNIFICANT CHANGE UP (ref 4–40)
BASOPHILS # BLD AUTO: 0.06 K/UL — SIGNIFICANT CHANGE UP (ref 0–0.2)
BASOPHILS NFR BLD AUTO: 0.9 % — SIGNIFICANT CHANGE UP (ref 0–2)
BILIRUB SERPL-MCNC: <0.2 MG/DL — SIGNIFICANT CHANGE UP (ref 0.2–1.2)
BUN SERPL-MCNC: 16 MG/DL — SIGNIFICANT CHANGE UP (ref 7–23)
CALCIUM SERPL-MCNC: 8.9 MG/DL — SIGNIFICANT CHANGE UP (ref 8.4–10.5)
CHLORIDE SERPL-SCNC: 99 MMOL/L — SIGNIFICANT CHANGE UP (ref 98–107)
CO2 SERPL-SCNC: 22 MMOL/L — SIGNIFICANT CHANGE UP (ref 22–31)
CREAT SERPL-MCNC: 0.67 MG/DL — SIGNIFICANT CHANGE UP (ref 0.5–1.3)
EGFR: 112 ML/MIN/1.73M2 — SIGNIFICANT CHANGE UP
EGFR: 112 ML/MIN/1.73M2 — SIGNIFICANT CHANGE UP
EOSINOPHIL # BLD AUTO: 0.09 K/UL — SIGNIFICANT CHANGE UP (ref 0–0.5)
EOSINOPHIL NFR BLD AUTO: 1.4 % — SIGNIFICANT CHANGE UP (ref 0–6)
GLUCOSE BLDC GLUCOMTR-MCNC: 195 MG/DL — HIGH (ref 70–99)
GLUCOSE BLDC GLUCOMTR-MCNC: 217 MG/DL — HIGH (ref 70–99)
GLUCOSE BLDC GLUCOMTR-MCNC: 228 MG/DL — HIGH (ref 70–99)
GLUCOSE BLDC GLUCOMTR-MCNC: 278 MG/DL — HIGH (ref 70–99)
GLUCOSE BLDC GLUCOMTR-MCNC: 293 MG/DL — HIGH (ref 70–99)
GLUCOSE SERPL-MCNC: 277 MG/DL — HIGH (ref 70–99)
HCT VFR BLD CALC: 30.2 % — LOW (ref 39–50)
HGB BLD-MCNC: 9.5 G/DL — LOW (ref 13–17)
IANC: 4.25 K/UL — SIGNIFICANT CHANGE UP (ref 1.8–7.4)
IMM GRANULOCYTES NFR BLD AUTO: 0.2 % — SIGNIFICANT CHANGE UP (ref 0–0.9)
LYMPHOCYTES # BLD AUTO: 1.45 K/UL — SIGNIFICANT CHANGE UP (ref 1–3.3)
LYMPHOCYTES # BLD AUTO: 22.7 % — SIGNIFICANT CHANGE UP (ref 13–44)
MAGNESIUM SERPL-MCNC: 1.9 MG/DL — SIGNIFICANT CHANGE UP (ref 1.6–2.6)
MCHC RBC-ENTMCNC: 27 PG — SIGNIFICANT CHANGE UP (ref 27–34)
MCHC RBC-ENTMCNC: 31.5 G/DL — LOW (ref 32–36)
MCV RBC AUTO: 85.8 FL — SIGNIFICANT CHANGE UP (ref 80–100)
MONOCYTES # BLD AUTO: 0.54 K/UL — SIGNIFICANT CHANGE UP (ref 0–0.9)
MONOCYTES NFR BLD AUTO: 8.4 % — SIGNIFICANT CHANGE UP (ref 2–14)
NEUTROPHILS # BLD AUTO: 4.25 K/UL — SIGNIFICANT CHANGE UP (ref 1.8–7.4)
NEUTROPHILS NFR BLD AUTO: 66.4 % — SIGNIFICANT CHANGE UP (ref 43–77)
NRBC # BLD AUTO: 0 K/UL — SIGNIFICANT CHANGE UP (ref 0–0)
NRBC # FLD: 0 K/UL — SIGNIFICANT CHANGE UP (ref 0–0)
NRBC BLD AUTO-RTO: 0 /100 WBCS — SIGNIFICANT CHANGE UP (ref 0–0)
PHOSPHATE SERPL-MCNC: 3.3 MG/DL — SIGNIFICANT CHANGE UP (ref 2.5–4.5)
PLATELET # BLD AUTO: 333 K/UL — SIGNIFICANT CHANGE UP (ref 150–400)
POTASSIUM SERPL-MCNC: 4.7 MMOL/L — SIGNIFICANT CHANGE UP (ref 3.5–5.3)
POTASSIUM SERPL-SCNC: 4.7 MMOL/L — SIGNIFICANT CHANGE UP (ref 3.5–5.3)
PROT SERPL-MCNC: 6.7 G/DL — SIGNIFICANT CHANGE UP (ref 6–8.3)
RBC # BLD: 3.52 M/UL — LOW (ref 4.2–5.8)
RBC # FLD: 12.6 % — SIGNIFICANT CHANGE UP (ref 10.3–14.5)
SODIUM SERPL-SCNC: 134 MMOL/L — LOW (ref 135–145)
VANCOMYCIN TROUGH SERPL-MCNC: 13.7 UG/ML — SIGNIFICANT CHANGE UP (ref 10–20)
WBC # BLD: 6.4 K/UL — SIGNIFICANT CHANGE UP (ref 3.8–10.5)
WBC # FLD AUTO: 6.4 K/UL — SIGNIFICANT CHANGE UP (ref 3.8–10.5)

## 2025-03-30 PROCEDURE — 99232 SBSQ HOSP IP/OBS MODERATE 35: CPT | Mod: GC

## 2025-03-30 PROCEDURE — 99232 SBSQ HOSP IP/OBS MODERATE 35: CPT

## 2025-03-30 RX ORDER — INSULIN GLARGINE-YFGN 100 [IU]/ML
23 INJECTION, SOLUTION SUBCUTANEOUS
Refills: 0 | Status: DISCONTINUED | OUTPATIENT
Start: 2025-03-31 | End: 2025-03-31

## 2025-03-30 RX ORDER — SODIUM CHLORIDE 9 G/1000ML
1000 INJECTION, SOLUTION INTRAVENOUS
Refills: 0 | Status: DISCONTINUED | OUTPATIENT
Start: 2025-03-30 | End: 2025-04-03

## 2025-03-30 RX ADMIN — INSULIN LISPRO 7 UNIT(S): 100 INJECTION, SOLUTION INTRAVENOUS; SUBCUTANEOUS at 17:19

## 2025-03-30 RX ADMIN — INSULIN LISPRO 3: 100 INJECTION, SOLUTION INTRAVENOUS; SUBCUTANEOUS at 08:39

## 2025-03-30 RX ADMIN — INSULIN LISPRO 7 UNIT(S): 100 INJECTION, SOLUTION INTRAVENOUS; SUBCUTANEOUS at 08:38

## 2025-03-30 RX ADMIN — Medication 25 GRAM(S): at 07:06

## 2025-03-30 RX ADMIN — ATORVASTATIN CALCIUM 20 MILLIGRAM(S): 80 TABLET, FILM COATED ORAL at 21:35

## 2025-03-30 RX ADMIN — Medication 1 APPLICATION(S): at 11:25

## 2025-03-30 RX ADMIN — Medication 166.67 MILLIGRAM(S): at 05:27

## 2025-03-30 RX ADMIN — INSULIN GLARGINE-YFGN 20 UNIT(S): 100 INJECTION, SOLUTION SUBCUTANEOUS at 08:40

## 2025-03-30 RX ADMIN — MUPIROCIN CALCIUM 1 APPLICATION(S): 20 CREAM TOPICAL at 17:22

## 2025-03-30 RX ADMIN — INSULIN LISPRO 1: 100 INJECTION, SOLUTION INTRAVENOUS; SUBCUTANEOUS at 17:19

## 2025-03-30 RX ADMIN — INSULIN LISPRO 2: 100 INJECTION, SOLUTION INTRAVENOUS; SUBCUTANEOUS at 12:30

## 2025-03-30 RX ADMIN — MUPIROCIN CALCIUM 1 APPLICATION(S): 20 CREAM TOPICAL at 05:28

## 2025-03-30 RX ADMIN — Medication 25 GRAM(S): at 15:29

## 2025-03-30 RX ADMIN — INSULIN LISPRO 7 UNIT(S): 100 INJECTION, SOLUTION INTRAVENOUS; SUBCUTANEOUS at 12:30

## 2025-03-30 RX ADMIN — Medication 166.67 MILLIGRAM(S): at 19:05

## 2025-03-30 RX ADMIN — SODIUM CHLORIDE 100 MILLILITER(S): 9 INJECTION, SOLUTION INTRAVENOUS at 11:25

## 2025-03-30 NOTE — PROGRESS NOTE ADULT - ASSESSMENT
"Anesthesia Post Evaluation    Patient: Susanne John    Procedure(s) Performed: Procedure(s) (LRB):  REDUCTION WITH PINNING-CLOSED-FINGER (Left)  REPAIR- EXTENSOR TENDON (Left)  INCISION AND DRAINAGE (I & D)-HAND (Left)  REPAIR-LACERATION HAND (Left)    Final Anesthesia Type: general  Patient location during evaluation: PACU  Patient participation: Yes- Able to Participate  Level of consciousness: awake and alert  Post-procedure vital signs: reviewed and stable  Pain management: adequate  Airway patency: patent  PONV status at discharge: No PONV  Anesthetic complications: no      Cardiovascular status: blood pressure returned to baseline and hemodynamically stable  Respiratory status: unassisted, spontaneous ventilation and room air  Hydration status: euvolemic  Follow-up not needed.        Visit Vitals  /86   Pulse 92   Temp 36.4 °C (97.5 °F) (Temporal)   Resp 12   Ht 5' 2" (1.575 m)   Wt 59 kg (130 lb)   SpO2 99%   Breastfeeding? No   BMI 23.78 kg/m²       Pain/Colleen Score: Pain Assessment Performed: Yes (9/21/2017  9:56 AM)  Presence of Pain: complains of pain/discomfort (9/21/2017  9:56 AM)  Pain Rating Prior to Med Admin: 7 (9/21/2017  9:50 AM)  Colleen Score: 10 (9/21/2017  9:56 AM)      " The patient is a 55y Male with PMH of DM who presented to the hospital with LE infection.  Endocrinology consulted for hyperglycemia    #Uncontrolled Type 2 Diabetes Mellitus with hyperglycemia and   #DKA, mild (resolving)  - Follows with: Dr. Hernandez  - A1C with Estimated Average Glucose Result: 16.7 % (03-26-25)  - home regimen: Lantus 12 units (3/29: pt states he want non-adherent with Lantus as he was concerned about the side effect of insulin. Pt educated on importance of skipping medication and DM complications), not compliant with Admelog moderate dose sliding scale; has dexcom G7 at home   - eGFR: 113 mL/min/1.73m2 (03-27-25)  - Weight (kg): 64 (03-27-25)  - glucose elevated, mild DKA on presentation showing improvement      INPATIENT PLAN:  - Inpatient BG goal 100-180mg/dl: AM & post prandial FS above goal.  - Increase Lantus to 23 units at 8AM starting 3/31.  - Continue Admelog 7 units TID before meals (HOLD if NPO or not eating)  - Continue low Admelog correctional scale TID AC  - Continue separate low Admelog correctional scale at HS  - Please check FSG before meals and QHS, or q6h while NPO; check 3 am FS   - consistent carb diet when eating  - nutrition consult placed  - Hypoglycemia Protocol   - Educated patient to please wait for FS prior to eating and to avoid eating between meals.  - BHB normal today (0.4 on 3/29 @ 12:13). No need to trend BHB unless bicard or pH is abnormal.       DISCHARGE PLANNING:  - Discharge recs basal bolus insulin, doses pending clinical course  - will need Endocrinology follow up. Please provide clinic info in DC paperwork for patient to make an appointment:    #Hyperlipidemia  - LDL goal <70  - Last LDL: 132 mg/dL (12-16-24)  - check lipid panel   -recommend starting statin if no contraindications  -lipid panel yearly outpt  - defer management to primary team    TAVON Sanchez-BC  Nurse Practitioner  Division of Endocrinology  Contact on TEAMS    If out of hospital/unavailable when paged, please note: patient will be cared for by another provider on the endocrine service.  For urgent concerns: call the endocrine answering service for assistance to reach covering provider ([phone]). For non-urgent matters: please email [email or web address] for assistance.

## 2025-03-30 NOTE — PROGRESS NOTE ADULT - SUBJECTIVE AND OBJECTIVE BOX
Patient is a 55y old  Male who presents with a chief complaint of left foot gangrene (29 Mar 2025 16:00)      SUBJECTIVE / OVERNIGHT EVENTS:  Patient seen and evaluated at bedside. Denies any fevers, chills, CP, or SOB.      REVIEW OF SYSTEMS:    CONSTITUTIONAL:  No weakness, fevers or chills  EYES/ENT:  No visual changes;  No vertigo or throat pain   NECK:  No pain or stiffness  RESPIRATORY:  No cough, wheezing, hemoptysis; No shortness of breath  CARDIOVASCULAR:  No chest pain or palpitations  GASTROINTESTINAL:  No abdominal or epigastric pain. No nausea, vomiting, or hematemesis; No diarrhea or constipation. No melena or hematochezia.  GENITOURINARY:  No dysuria, frequency or hematuria  NEUROLOGICAL:  No numbness or weakness  SKIN:  No itching, rashes        MEDICATIONS:  acetaminophen     Tablet .. 650 milliGRAM(s) Oral every 6 hours PRN Temp greater or equal to 38C (100.4F), Mild Pain (1 - 3)  aluminum hydroxide/magnesium hydroxide/simethicone Suspension 30 milliLiter(s) Oral every 4 hours PRN Dyspepsia  atorvastatin 20 milliGRAM(s) Oral at bedtime  chlorhexidine 2% Cloths 1 Application(s) Topical daily  dextrose 5%. 1000 milliLiter(s) IV Continuous <Continuous>  dextrose 5%. 1000 milliLiter(s) IV Continuous <Continuous>  dextrose 50% Injectable 25 Gram(s) IV Push once  dextrose 50% Injectable 12.5 Gram(s) IV Push once  dextrose 50% Injectable 25 Gram(s) IV Push once  dextrose Oral Gel 15 Gram(s) Oral once  glucagon  Injectable 1 milliGRAM(s) IntraMuscular once  insulin glargine Injectable (LANTUS) 20 Unit(s) SubCutaneous <User Schedule>  insulin lispro (ADMELOG) corrective regimen sliding scale   SubCutaneous at bedtime  insulin lispro (ADMELOG) corrective regimen sliding scale   SubCutaneous three times a day before meals  insulin lispro Injectable (ADMELOG) 7 Unit(s) SubCutaneous three times a day before meals  lactated ringers. 1000 milliLiter(s) IV Continuous <Continuous>  melatonin 6 milliGRAM(s) Oral at bedtime PRN Insomnia  mupirocin 2% Ointment 1 Application(s) Topical two times a day  piperacillin/tazobactam IVPB.. 3.375 Gram(s) IV Intermittent every 8 hours  vancomycin  IVPB 1250 milliGRAM(s) IV Intermittent every 12 hours        Vital Signs Last 24 Hrs  T(C): 36.8 (30 Mar 2025 05:30), Max: 36.8 (29 Mar 2025 21:15)  T(F): 98.2 (30 Mar 2025 05:30), Max: 98.2 (29 Mar 2025 21:15)  HR: 80 (30 Mar 2025 05:30) (72 - 80)  BP: 144/86 (30 Mar 2025 05:30) (144/86 - 161/96)  BP(mean): --  RR: 18 (30 Mar 2025 05:30) (18 - 18)  SpO2: 97% (30 Mar 2025 05:30) (97% - 99%)    Parameters below as of 30 Mar 2025 05:30  Patient On (Oxygen Delivery Method): room air          PHYSICAL EXAM:  GENERAL: NAD, lying in bed comfortably, conversational  HEAD:  Atraumatic, normocephalic  EYES: EOMI, PERRLA, conjunctiva and sclera clear  NECK: Supple, trachea midline, no JVD  HEART: +S1/S2, RRR, no murmurs, rubs, or gallops  LUNGS: Unlabored respirations. CTA b/l, no crackles, wheezing, or rhonchi  ABDOMEN: Soft, NTND, +BS. No masses or hernia palpated  EXTREMITIES: 2+ peripheral pulses bilaterally. No clubbing, cyanosis, or edema  MSK: no gross deformity in extremities, no step off or deformity in C/T/L spine, normal muscle strength/tone  NERVOUS SYSTEM: CN II-XII intact, A&Ox3, moving all extremities spontaneously, no focal deficits, sensation intact and equal in b/l extremities  SKIN: No rashes or lesions      LABS:                        9.5    6.40  )-----------( 333      ( 30 Mar 2025 03:03 )             30.2     Hgb Trend: 9.5<--, 9.9<--, 9.6<--, 11.1<--  03-30    134[L]  |  99  |  16  ----------------------------<  277[H]  4.7   |  22  |  0.67    Ca    8.9      30 Mar 2025 03:03  Phos  3.3     03-30  Mg     1.90     03-30    TPro  6.7  /  Alb  3.0[L]  /  TBili  <0.2  /  DBili  x   /  AST  20  /  ALT  7   /  AlkPhos  102  03-30    Creatinine Trend: 0.67<--, 0.63<--, 0.71<--, 0.80<--, 0.59<--, 0.65<--      LIVER FUNCTIONS - ( 30 Mar 2025 03:03 )  Alb: 3.0 g/dL / Pro: 6.7 g/dL / ALK PHOS: 102 U/L / ALT: 7 U/L / AST: 20 U/L / GGT: x             Urinalysis Basic - ( 30 Mar 2025 03:03 )    Color: x / Appearance: x / SG: x / pH: x  Gluc: 277 mg/dL / Ketone: x  / Bili: x / Urobili: x   Blood: x / Protein: x / Nitrite: x   Leuk Esterase: x / RBC: x / WBC x   Sq Epi: x / Non Sq Epi: x / Bacteria: x     Patient is a 55y old  Male who presents with a chief complaint of left foot gangrene (29 Mar 2025 16:00)      SUBJECTIVE / OVERNIGHT EVENTS:  Patient seen and evaluated at bedside. Denies any fevers, chills, CP, or SOB. No specific complaints overnight. Reports good PO tolerance.      REVIEW OF SYSTEMS:  CONSTITUTIONAL:  No fevers or chills  RESPIRATORY:  No cough, wheezing; No shortness of breath  CARDIOVASCULAR:  No chest pain or palpitations  GASTROINTESTINAL:  No abdominal or epigastric pain. No nausea, vomiting; No diarrhea or constipation      MEDICATIONS:  acetaminophen     Tablet .. 650 milliGRAM(s) Oral every 6 hours PRN Temp greater or equal to 38C (100.4F), Mild Pain (1 - 3)  aluminum hydroxide/magnesium hydroxide/simethicone Suspension 30 milliLiter(s) Oral every 4 hours PRN Dyspepsia  atorvastatin 20 milliGRAM(s) Oral at bedtime  chlorhexidine 2% Cloths 1 Application(s) Topical daily  dextrose 5%. 1000 milliLiter(s) IV Continuous <Continuous>  dextrose 5%. 1000 milliLiter(s) IV Continuous <Continuous>  dextrose 50% Injectable 25 Gram(s) IV Push once  dextrose 50% Injectable 12.5 Gram(s) IV Push once  dextrose 50% Injectable 25 Gram(s) IV Push once  dextrose Oral Gel 15 Gram(s) Oral once  glucagon  Injectable 1 milliGRAM(s) IntraMuscular once  insulin glargine Injectable (LANTUS) 20 Unit(s) SubCutaneous <User Schedule>  insulin lispro (ADMELOG) corrective regimen sliding scale   SubCutaneous at bedtime  insulin lispro (ADMELOG) corrective regimen sliding scale   SubCutaneous three times a day before meals  insulin lispro Injectable (ADMELOG) 7 Unit(s) SubCutaneous three times a day before meals  lactated ringers. 1000 milliLiter(s) IV Continuous <Continuous>  melatonin 6 milliGRAM(s) Oral at bedtime PRN Insomnia  mupirocin 2% Ointment 1 Application(s) Topical two times a day  piperacillin/tazobactam IVPB.. 3.375 Gram(s) IV Intermittent every 8 hours  vancomycin  IVPB 1250 milliGRAM(s) IV Intermittent every 12 hours        Vital Signs Last 24 Hrs  T(C): 36.8 (30 Mar 2025 05:30), Max: 36.8 (29 Mar 2025 21:15)  T(F): 98.2 (30 Mar 2025 05:30), Max: 98.2 (29 Mar 2025 21:15)  HR: 80 (30 Mar 2025 05:30) (72 - 80)  BP: 144/86 (30 Mar 2025 05:30) (144/86 - 161/96)  BP(mean): --  RR: 18 (30 Mar 2025 05:30) (18 - 18)  SpO2: 97% (30 Mar 2025 05:30) (97% - 99%)    Parameters below as of 30 Mar 2025 05:30  Patient On (Oxygen Delivery Method): room air      PHYSICAL EXAM:  GENERAL: NAD, lying in bed comfortably, conversational  HEART: +S1/S2, RRR, no murmurs, rubs, or gallops  LUNGS: Unlabored respirations. CTA b/l  ABDOMEN: Soft, NTND, +BS  EXTREMITIES: 2+ peripheral pulses bilaterally  MSK: no gross deformity in extremities, normal muscle strength/tone  NERVOUS SYSTEM: A&Ox3, moving all extremities spontaneously      LABS:                        9.5    6.40  )-----------( 333      ( 30 Mar 2025 03:03 )             30.2     Hgb Trend: 9.5<--, 9.9<--, 9.6<--, 11.1<--  03-30    134[L]  |  99  |  16  ----------------------------<  277[H]  4.7   |  22  |  0.67    Ca    8.9      30 Mar 2025 03:03  Phos  3.3     03-30  Mg     1.90     03-30    TPro  6.7  /  Alb  3.0[L]  /  TBili  <0.2  /  DBili  x   /  AST  20  /  ALT  7   /  AlkPhos  102  03-30    Creatinine Trend: 0.67<--, 0.63<--, 0.71<--, 0.80<--, 0.59<--, 0.65<--      LIVER FUNCTIONS - ( 30 Mar 2025 03:03 )  Alb: 3.0 g/dL / Pro: 6.7 g/dL / ALK PHOS: 102 U/L / ALT: 7 U/L / AST: 20 U/L / GGT: x             Urinalysis Basic - ( 30 Mar 2025 03:03 )    Color: x / Appearance: x / SG: x / pH: x  Gluc: 277 mg/dL / Ketone: x  / Bili: x / Urobili: x   Blood: x / Protein: x / Nitrite: x   Leuk Esterase: x / RBC: x / WBC x   Sq Epi: x / Non Sq Epi: x / Bacteria: x

## 2025-03-30 NOTE — PROGRESS NOTE ADULT - PROBLEM SELECTOR PLAN 1
L foot 2nd digit gangrene. L foot xray showed no gas, no osteomyelitis. Podiatry following; local wound care v. left foot partial 2nd ray resection pending vascular recs.    - c/w IV zosyn + vancomycin (cover for gram+ cocci gram- rods, anaerobes, MRSA)  - f/u INDY/PVR -> no occlusion/significant stenosis  - f/u blood cultures -> NG@24  - f/u vascular consult -> no contraindication to surgery L foot 2nd digit gangrene. L foot xray showed no gas, no osteomyelitis. Podiatry following; local wound care v. left foot partial 2nd ray resection pending vascular recs.    - c/w IV zosyn + vancomycin (cover for gram+ cocci gram- rods, anaerobes, MRSA)  - f/u INDY/PVR -> no occlusion/significant stenosis  - f/u blood cultures 3/26 -> NG  - f/u vascular consult -> no contraindication to surgery

## 2025-03-30 NOTE — PROGRESS NOTE ADULT - PROBLEM SELECTOR PLAN 3
- initially elevated glucoses and BHB NOW RESOLVED however still with persistent elevation in BHB, received 2 liters LR on 3/28 with resolution of BHB  - will trial 1L LR on 3/29- re-check BHB  - Endocrine following on lantus 16, premeal 6 and slide scale - initially elevated glucoses and BHB NOW RESOLVED however still with persistent elevation in BHB, received 2 liters LR on 3/28 with resolution of BHB  - will trial 1L LR on 3/29- re-check BHB  - IVF as needed for BHB, pt tolerating PO  - 3/30 additional 500cc (100/hr x5h)  - Endocrine following -> lantus 20, premeal 7 and ISS

## 2025-03-30 NOTE — PROGRESS NOTE ADULT - ASSESSMENT
56 y/o M with PMH of DM2, sent in by podiatrist Dr. 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 Admitted for management of L foot dry gangrene / evaluation for possible amputation, found to have mild DKA resolving s/p ademlog 10u and initiation of inpatient insulin regimen, receiving vanc and zosyn while awaiting left foot second partial ray resection with Dr. Ferro on 4/3.

## 2025-03-30 NOTE — PROGRESS NOTE ADULT - SUBJECTIVE AND OBJECTIVE BOX
Chief Complaint: T2DM    Interval Events: Pt seen and examined at bedside earlier today.  Pt tolerating oral diet and denies nausea and vomiting/any signs of hypoglycemia. Pt reports an adequate appetite. Pt requesting that I not make changes to his insulin doses. He feels he has been given too much rice with his meals and feels the D5 in his antibiotics are making his glucose high. Educated pt on the adverse consequence of impaired wound healing if BS remains high.  Ok with increase in Lantus dose only.     MEDICATIONS  (STANDING):  atorvastatin 20 milliGRAM(s) Oral at bedtime  chlorhexidine 2% Cloths 1 Application(s) Topical daily  dextrose 5%. 1000 milliLiter(s) (100 mL/Hr) IV Continuous <Continuous>  dextrose 5%. 1000 milliLiter(s) (50 mL/Hr) IV Continuous <Continuous>  dextrose 50% Injectable 25 Gram(s) IV Push once  dextrose 50% Injectable 12.5 Gram(s) IV Push once  dextrose 50% Injectable 25 Gram(s) IV Push once  dextrose Oral Gel 15 Gram(s) Oral once  glucagon  Injectable 1 milliGRAM(s) IntraMuscular once  insulin glargine Injectable (LANTUS) 23 Unit(s) SubCutaneous <User Schedule>  insulin lispro (ADMELOG) corrective regimen sliding scale   SubCutaneous at bedtime  insulin lispro (ADMELOG) corrective regimen sliding scale   SubCutaneous three times a day before meals  insulin lispro Injectable (ADMELOG) 7 Unit(s) SubCutaneous three times a day before meals  lactated ringers. 1000 milliLiter(s) (100 mL/Hr) IV Continuous <Continuous>  mupirocin 2% Ointment 1 Application(s) Topical two times a day  piperacillin/tazobactam IVPB.. 3.375 Gram(s) IV Intermittent every 8 hours  vancomycin  IVPB 1250 milliGRAM(s) IV Intermittent every 12 hours    MEDICATIONS  (PRN):  acetaminophen     Tablet .. 650 milliGRAM(s) Oral every 6 hours PRN Temp greater or equal to 38C (100.4F), Mild Pain (1 - 3)  aluminum hydroxide/magnesium hydroxide/simethicone Suspension 30 milliLiter(s) Oral every 4 hours PRN Dyspepsia  melatonin 6 milliGRAM(s) Oral at bedtime PRN Insomnia      Allergies    No Known Allergies    Intolerances      Review of Systems:  Eyes: No blurry vision  Cardiovascular: No chest pain, palpitations  Respiratory: No SOB, no cough  GI: No nausea, vomiting, abdominal pain  : No dysuria    VITALS: T(C): 36.3 (03-30-25 @ 12:41)  T(F): 97.4 (03-30-25 @ 12:41), Max: 98.2 (03-29-25 @ 21:15)  HR: 76 (03-30-25 @ 12:41) (76 - 80)  BP: 154/89 (03-30-25 @ 12:41) (144/86 - 161/96)  RR:  (16 - 18)  SpO2:  (97% - 100%)  Wt(kg): --      Physical Exam:   GENERAL: NAD, well-developed  EYES: No proptosis  HEENT:  Atraumatic, Normocephalic  RESPIRATORY: non labored breathing   GI: Non distended  PSYCH: Alert, normal affect, normal mood      CAPILLARY BLOOD GLUCOSE      POCT Blood Glucose.: 228 mg/dL (30 Mar 2025 12:13)  POCT Blood Glucose.: 293 mg/dL (30 Mar 2025 08:26)  POCT Blood Glucose.: 278 mg/dL (30 Mar 2025 03:12)  POCT Blood Glucose.: 249 mg/dL (29 Mar 2025 21:21)  POCT Blood Glucose.: 164 mg/dL (29 Mar 2025 17:14)      03-30    134[L]  |  99  |  16  ----------------------------<  277[H]  4.7   |  22  |  0.67    eGFR: 112    Ca    8.9      03-30  Mg     1.90     03-30  Phos  3.3     03-30    TPro  6.7  /  Alb  3.0[L]  /  TBili  <0.2  /  DBili  x   /  AST  20  /  ALT  7   /  AlkPhos  102  03-30      A1C with Estimated Average Glucose Result: 16.7 % (03-26-25 @ 22:23)  A1C with Estimated Average Glucose Result: 12.5 % (12-14-24 @ 00:48)      Thyroid Function Tests:

## 2025-03-31 DIAGNOSIS — E11.65 TYPE 2 DIABETES MELLITUS WITH HYPERGLYCEMIA: ICD-10-CM

## 2025-03-31 DIAGNOSIS — D64.9 ANEMIA, UNSPECIFIED: ICD-10-CM

## 2025-03-31 LAB
ALBUMIN SERPL ELPH-MCNC: 3 G/DL — LOW (ref 3.3–5)
ALP SERPL-CCNC: 109 U/L — SIGNIFICANT CHANGE UP (ref 40–120)
ALT FLD-CCNC: 12 U/L — SIGNIFICANT CHANGE UP (ref 4–41)
ANION GAP SERPL CALC-SCNC: 12 MMOL/L — SIGNIFICANT CHANGE UP (ref 7–14)
AST SERPL-CCNC: 13 U/L — SIGNIFICANT CHANGE UP (ref 4–40)
BASOPHILS # BLD AUTO: 0.06 K/UL — SIGNIFICANT CHANGE UP (ref 0–0.2)
BASOPHILS NFR BLD AUTO: 0.9 % — SIGNIFICANT CHANGE UP (ref 0–2)
BILIRUB SERPL-MCNC: <0.2 MG/DL — SIGNIFICANT CHANGE UP (ref 0.2–1.2)
BUN SERPL-MCNC: 18 MG/DL — SIGNIFICANT CHANGE UP (ref 7–23)
CALCIUM SERPL-MCNC: 8.7 MG/DL — SIGNIFICANT CHANGE UP (ref 8.4–10.5)
CHLORIDE SERPL-SCNC: 100 MMOL/L — SIGNIFICANT CHANGE UP (ref 98–107)
CO2 SERPL-SCNC: 24 MMOL/L — SIGNIFICANT CHANGE UP (ref 22–31)
CREAT SERPL-MCNC: 0.7 MG/DL — SIGNIFICANT CHANGE UP (ref 0.5–1.3)
EGFR: 110 ML/MIN/1.73M2 — SIGNIFICANT CHANGE UP
EGFR: 110 ML/MIN/1.73M2 — SIGNIFICANT CHANGE UP
EOSINOPHIL # BLD AUTO: 0.13 K/UL — SIGNIFICANT CHANGE UP (ref 0–0.5)
EOSINOPHIL NFR BLD AUTO: 1.9 % — SIGNIFICANT CHANGE UP (ref 0–6)
GLUCOSE BLDC GLUCOMTR-MCNC: 179 MG/DL — HIGH (ref 70–99)
GLUCOSE BLDC GLUCOMTR-MCNC: 180 MG/DL — HIGH (ref 70–99)
GLUCOSE BLDC GLUCOMTR-MCNC: 241 MG/DL — HIGH (ref 70–99)
GLUCOSE BLDC GLUCOMTR-MCNC: 249 MG/DL — HIGH (ref 70–99)
GLUCOSE BLDC GLUCOMTR-MCNC: 264 MG/DL — HIGH (ref 70–99)
GLUCOSE SERPL-MCNC: 278 MG/DL — HIGH (ref 70–99)
HCT VFR BLD CALC: 29.5 % — LOW (ref 39–50)
HGB BLD-MCNC: 9.4 G/DL — LOW (ref 13–17)
IANC: 4.4 K/UL — SIGNIFICANT CHANGE UP (ref 1.8–7.4)
IMM GRANULOCYTES NFR BLD AUTO: 0.4 % — SIGNIFICANT CHANGE UP (ref 0–0.9)
LYMPHOCYTES # BLD AUTO: 1.61 K/UL — SIGNIFICANT CHANGE UP (ref 1–3.3)
LYMPHOCYTES # BLD AUTO: 23.5 % — SIGNIFICANT CHANGE UP (ref 13–44)
MAGNESIUM SERPL-MCNC: 2 MG/DL — SIGNIFICANT CHANGE UP (ref 1.6–2.6)
MCHC RBC-ENTMCNC: 27 PG — SIGNIFICANT CHANGE UP (ref 27–34)
MCHC RBC-ENTMCNC: 31.9 G/DL — LOW (ref 32–36)
MCV RBC AUTO: 84.8 FL — SIGNIFICANT CHANGE UP (ref 80–100)
MONOCYTES # BLD AUTO: 0.62 K/UL — SIGNIFICANT CHANGE UP (ref 0–0.9)
MONOCYTES NFR BLD AUTO: 9.1 % — SIGNIFICANT CHANGE UP (ref 2–14)
NEUTROPHILS # BLD AUTO: 4.4 K/UL — SIGNIFICANT CHANGE UP (ref 1.8–7.4)
NEUTROPHILS NFR BLD AUTO: 64.2 % — SIGNIFICANT CHANGE UP (ref 43–77)
NRBC # BLD AUTO: 0 K/UL — SIGNIFICANT CHANGE UP (ref 0–0)
NRBC # FLD: 0 K/UL — SIGNIFICANT CHANGE UP (ref 0–0)
NRBC BLD AUTO-RTO: 0 /100 WBCS — SIGNIFICANT CHANGE UP (ref 0–0)
PHOSPHATE SERPL-MCNC: 3.3 MG/DL — SIGNIFICANT CHANGE UP (ref 2.5–4.5)
PLATELET # BLD AUTO: 318 K/UL — SIGNIFICANT CHANGE UP (ref 150–400)
POTASSIUM SERPL-MCNC: 4.3 MMOL/L — SIGNIFICANT CHANGE UP (ref 3.5–5.3)
POTASSIUM SERPL-SCNC: 4.3 MMOL/L — SIGNIFICANT CHANGE UP (ref 3.5–5.3)
PROT SERPL-MCNC: 6.6 G/DL — SIGNIFICANT CHANGE UP (ref 6–8.3)
RBC # BLD: 3.48 M/UL — LOW (ref 4.2–5.8)
RBC # FLD: 12.3 % — SIGNIFICANT CHANGE UP (ref 10.3–14.5)
SODIUM SERPL-SCNC: 136 MMOL/L — SIGNIFICANT CHANGE UP (ref 135–145)
WBC # BLD: 6.85 K/UL — SIGNIFICANT CHANGE UP (ref 3.8–10.5)
WBC # FLD AUTO: 6.85 K/UL — SIGNIFICANT CHANGE UP (ref 3.8–10.5)

## 2025-03-31 PROCEDURE — 99232 SBSQ HOSP IP/OBS MODERATE 35: CPT | Mod: GC

## 2025-03-31 PROCEDURE — 99232 SBSQ HOSP IP/OBS MODERATE 35: CPT

## 2025-03-31 RX ORDER — INSULIN LISPRO 100 U/ML
8 INJECTION, SOLUTION INTRAVENOUS; SUBCUTANEOUS
Refills: 0 | Status: DISCONTINUED | OUTPATIENT
Start: 2025-03-31 | End: 2025-04-01

## 2025-03-31 RX ORDER — INSULIN GLARGINE-YFGN 100 [IU]/ML
25 INJECTION, SOLUTION SUBCUTANEOUS
Refills: 0 | Status: DISCONTINUED | OUTPATIENT
Start: 2025-04-01 | End: 2025-04-01

## 2025-03-31 RX ADMIN — Medication 25 GRAM(S): at 08:57

## 2025-03-31 RX ADMIN — INSULIN LISPRO 7 UNIT(S): 100 INJECTION, SOLUTION INTRAVENOUS; SUBCUTANEOUS at 08:54

## 2025-03-31 RX ADMIN — INSULIN GLARGINE-YFGN 23 UNIT(S): 100 INJECTION, SOLUTION SUBCUTANEOUS at 08:53

## 2025-03-31 RX ADMIN — Medication 25 GRAM(S): at 01:29

## 2025-03-31 RX ADMIN — MUPIROCIN CALCIUM 1 APPLICATION(S): 20 CREAM TOPICAL at 17:26

## 2025-03-31 RX ADMIN — INSULIN LISPRO 7 UNIT(S): 100 INJECTION, SOLUTION INTRAVENOUS; SUBCUTANEOUS at 12:25

## 2025-03-31 RX ADMIN — Medication 166.67 MILLIGRAM(S): at 05:22

## 2025-03-31 RX ADMIN — INSULIN LISPRO 8 UNIT(S): 100 INJECTION, SOLUTION INTRAVENOUS; SUBCUTANEOUS at 17:24

## 2025-03-31 RX ADMIN — Medication 1 APPLICATION(S): at 12:28

## 2025-03-31 RX ADMIN — INSULIN LISPRO 2: 100 INJECTION, SOLUTION INTRAVENOUS; SUBCUTANEOUS at 08:54

## 2025-03-31 RX ADMIN — INSULIN LISPRO 3: 100 INJECTION, SOLUTION INTRAVENOUS; SUBCUTANEOUS at 12:25

## 2025-03-31 RX ADMIN — INSULIN LISPRO 1: 100 INJECTION, SOLUTION INTRAVENOUS; SUBCUTANEOUS at 17:25

## 2025-03-31 NOTE — PROGRESS NOTE ADULT - SUBJECTIVE AND OBJECTIVE BOX
Chief Complaint: type 2 DM    History: Tolerating PO diet. FS above goal.     MEDICATIONS  (STANDING):  atorvastatin 20 milliGRAM(s) Oral at bedtime  chlorhexidine 2% Cloths 1 Application(s) Topical daily  dextrose 5%. 1000 milliLiter(s) (100 mL/Hr) IV Continuous <Continuous>  dextrose 5%. 1000 milliLiter(s) (50 mL/Hr) IV Continuous <Continuous>  dextrose 50% Injectable 25 Gram(s) IV Push once  dextrose 50% Injectable 12.5 Gram(s) IV Push once  dextrose 50% Injectable 25 Gram(s) IV Push once  dextrose Oral Gel 15 Gram(s) Oral once  glucagon  Injectable 1 milliGRAM(s) IntraMuscular once  insulin lispro (ADMELOG) corrective regimen sliding scale   SubCutaneous at bedtime  insulin lispro (ADMELOG) corrective regimen sliding scale   SubCutaneous three times a day before meals  insulin lispro Injectable (ADMELOG) 8 Unit(s) SubCutaneous three times a day before meals  lactated ringers. 1000 milliLiter(s) (100 mL/Hr) IV Continuous <Continuous>  mupirocin 2% Ointment 1 Application(s) Topical two times a day    MEDICATIONS  (PRN):  acetaminophen     Tablet .. 650 milliGRAM(s) Oral every 6 hours PRN Temp greater or equal to 38C (100.4F), Mild Pain (1 - 3)  aluminum hydroxide/magnesium hydroxide/simethicone Suspension 30 milliLiter(s) Oral every 4 hours PRN Dyspepsia  melatonin 6 milliGRAM(s) Oral at bedtime PRN Insomnia      Allergies    No Known Allergies    Intolerances      Review of Systems:  Cardiovascular: No chest pain, palpitations  Respiratory: No SOB, no cough  GI: No nausea, vomiting, abdominal pain  : No dysuria  Endocrine: no polyuria, polydipsia      PHYSICAL EXAM:  VITALS: T(C): 36.7 (03-31-25 @ 12:00)  T(F): 98.1 (03-31-25 @ 12:00), Max: 98.1 (03-31-25 @ 12:00)  HR: 75 (03-31-25 @ 12:00) (75 - 78)  BP: 131/78 (03-31-25 @ 12:00) (131/78 - 151/91)  RR:  (17 - 18)  SpO2:  (98% - 100%)  Wt(kg): --  GENERAL: NAD, well-groomed, well-developed  EYES: No proptosis  HEENT:  Atraumatic, Normocephalic  RESPIRATORY: non labored breathing   CARDIOVASCULAR: Regular rate and rhythm  GI: Soft, nontender, non distended  PSYCH: Alert and oriented x 3, normal affect, normal mood       CAPILLARY BLOOD GLUCOSE      POCT Blood Glucose.: 264 mg/dL (31 Mar 2025 12:06)  POCT Blood Glucose.: 249 mg/dL (31 Mar 2025 08:34)  POCT Blood Glucose.: 241 mg/dL (31 Mar 2025 04:06)  POCT Blood Glucose.: 217 mg/dL (30 Mar 2025 21:22)  POCT Blood Glucose.: 195 mg/dL (30 Mar 2025 16:55)      03-31    136  |  100  |  18  ----------------------------<  278[H]  4.3   |  24  |  0.70    eGFR: 110    Ca    8.7      03-31  Mg     2.00     03-31  Phos  3.3     03-31    TPro  6.6  /  Alb  3.0[L]  /  TBili  <0.2  /  DBili  x   /  AST  13  /  ALT  12  /  AlkPhos  109  03-31          Thyroid Function Tests:        A1C with Estimated Average Glucose Result: 16.7 % (03-26-25 @ 22:23)  A1C with Estimated Average Glucose Result: 12.5 % (12-14-24 @ 00:48)          Diet, Consistent Carbohydrate/No Snacks (03-27-25 @ 15:44)

## 2025-03-31 NOTE — PROGRESS NOTE ADULT - PROBLEM SELECTOR PLAN 4
- DVT ppx: sq heparin h/o gastric bypass for the sake of blood sugar control. Pt denies diarrhea, constipation, any GI sx.

## 2025-03-31 NOTE — PROGRESS NOTE ADULT - ASSESSMENT
54 y/o M with PMH of DM2, sent in by podiatrist Dr. 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 Admitted for management of L foot dry gangrene / evaluation for possible amputation, found to have mild DKA resolving s/p ademlog 10u and initiation of inpatient insulin regimen, receiving vanc and zosyn while awaiting left foot second partial ray resection with Dr. Ferro on 4/3.       56 y/o M with PMH of DM2, sent in by podiatrist Dr. 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 Admitted for management of L foot dry gangrene / evaluation for possible amputation, found to have mild DKA resolving s/p ademlog 10u and initiation of inpatient insulin regimen, receiving vanc and zosyn while awaiting left foot second partial ray resection with Dr. Osuna on Wed 4/2 at 2.

## 2025-03-31 NOTE — PROGRESS NOTE ADULT - PROBLEM SELECTOR PLAN 1
L foot 2nd digit gangrene. L foot xray showed no gas, no osteomyelitis. Podiatry following; local wound care v. left foot partial 2nd ray resection pending vascular recs.    - c/w IV zosyn + vancomycin (cover for gram+ cocci gram- rods, anaerobes, MRSA)  - f/u INDY/PVR -> no occlusion/significant stenosis  - f/u blood cultures 3/26 -> NG  - f/u vascular consult -> no contraindication to surgery L foot 2nd digit gangrene. L foot xray showed no gas, no osteomyelitis. Podiatry following; local wound care v. left foot partial 2nd ray resection pending vascular recs.    - c/w IV zosyn + vancomycin (cover for gram+ cocci gram- rods, anaerobes, MRSA)  - f/u INDY/PVR -> no occlusion/significant stenosis  - f/u blood cultures 3/31 -> NG  - f/u vascular consult -> no contraindication to surgery L foot 2nd digit gangrene. L foot xray showed no gas, no osteomyelitis. Podiatry following; left foot partial 2nd ray resection with Dr. Osuna on Wed 4/2 at 2.   No signs of infection on L foot 2nd toe or R posteromedial ankle wound to subQ, no more leukocytosis, negative MRSA swab, no growth on blood cultures; therefore ok to d/c Abx (cleared with podiatry). Called podiatry to advance surgery date; they are unable to given it is non-emergent.    - d/c IV zosyn + vancomycin (cover for gram+ cocci gram- rods, anaerobes, MRSA)  - f/u INDY/PVR -> no occlusion/significant stenosis  - f/u blood cultures 3/31 -> NG  - f/u vascular consult -> no contraindication to surgery

## 2025-03-31 NOTE — PROGRESS NOTE ADULT - ASSESSMENT
The patient is a 55y Male with PMH of DM who presented to the hospital with LE infection.  Endocrinology consulted for hyperglycemia    #Uncontrolled Type 2 Diabetes Mellitus with hyperglycemia and   #DKA, mild (resolving)  - Follows with: Dr. Hernandez  - A1C with Estimated Average Glucose Result: 16.7 % (03-26-25)  - home regimen: Lantus 12 units (3/29: pt states he want non-adherent with Lantus as he was concerned about the side effect of insulin. Pt educated on importance of skipping medication and DM complications), not compliant with Admelog moderate dose sliding scale; has dexcom G7 at home   - eGFR: 113 mL/min/1.73m2 (03-27-25)  - Weight (kg): 64 (03-27-25)  - glucose elevated, mild DKA on presentation showing improvement      INPATIENT PLAN:  - Inpatient BG goal 100-180mg/dl: FS above goal.   - Increase Lantus to 25 units at 8AM starting 4/1, received 23 units this morning.   - Increase Admelog to 8 units TID before meals (HOLD if NPO or not eating)  - Continue low Admelog correctional scale TID AC  - Continue separate low Admelog correctional scale at HS  - Please check FSG before meals and QHS, or q6h while NPO; check 3 am FS   - consistent carb diet when eating  - nutrition consult placed  - Hypoglycemia Protocol   - Educated patient to please wait for FS prior to eating and to avoid eating between meals.  - BHB normal today (0.4 on 3/29 @ 12:13). No need to trend BHB unless bicard or pH is abnormal.       DISCHARGE PLANNING:  - Discharge recs basal bolus insulin, doses pending clinical course  - will need Endocrinology follow up. Please provide clinic info in DC paperwork for patient to make an appointment: - Patient to call [facility] at [place]:   [address] [place] [address]. [address]  [place] [address]   [phone]    #Hyperlipidemia  - LDL goal <70  - Last LDL: 132 mg/dL (12-16-24)  - check lipid panel   -c/w lipitor 20 mg daily   -lipid panel yearly outpt  - defer management to primary team    TAVON Davila-BC  Nurse Practitioner  Division of Endocrinology & Diabetes  Contact on TEAMS    If out of hospital/unavailable when paged, please note: patient will be cared for by another provider on the endocrine service.  For urgent concerns: call the endocrine answering service for assistance to reach covering provider ([phone]). For non-urgent matters: please email [email or web address] for assistance.

## 2025-03-31 NOTE — PROGRESS NOTE ADULT - SUBJECTIVE AND OBJECTIVE BOX
Patient is a 55y old  Male who presents with a chief complaint of left foot gangrene (30 Mar 2025 15:17)      SUBJECTIVE / OVERNIGHT EVENTS:    MEDICATIONS  (STANDING):  atorvastatin 20 milliGRAM(s) Oral at bedtime  chlorhexidine 2% Cloths 1 Application(s) Topical daily  dextrose 5%. 1000 milliLiter(s) (100 mL/Hr) IV Continuous <Continuous>  dextrose 5%. 1000 milliLiter(s) (50 mL/Hr) IV Continuous <Continuous>  dextrose 50% Injectable 25 Gram(s) IV Push once  dextrose 50% Injectable 12.5 Gram(s) IV Push once  dextrose 50% Injectable 25 Gram(s) IV Push once  dextrose Oral Gel 15 Gram(s) Oral once  glucagon  Injectable 1 milliGRAM(s) IntraMuscular once  insulin glargine Injectable (LANTUS) 23 Unit(s) SubCutaneous <User Schedule>  insulin lispro (ADMELOG) corrective regimen sliding scale   SubCutaneous at bedtime  insulin lispro (ADMELOG) corrective regimen sliding scale   SubCutaneous three times a day before meals  insulin lispro Injectable (ADMELOG) 7 Unit(s) SubCutaneous three times a day before meals  lactated ringers. 1000 milliLiter(s) (100 mL/Hr) IV Continuous <Continuous>  mupirocin 2% Ointment 1 Application(s) Topical two times a day  piperacillin/tazobactam IVPB.. 3.375 Gram(s) IV Intermittent every 8 hours  vancomycin  IVPB 1250 milliGRAM(s) IV Intermittent every 12 hours    MEDICATIONS  (PRN):  acetaminophen     Tablet .. 650 milliGRAM(s) Oral every 6 hours PRN Temp greater or equal to 38C (100.4F), Mild Pain (1 - 3)  aluminum hydroxide/magnesium hydroxide/simethicone Suspension 30 milliLiter(s) Oral every 4 hours PRN Dyspepsia  melatonin 6 milliGRAM(s) Oral at bedtime PRN Insomnia      CAPILLARY BLOOD GLUCOSE      POCT Blood Glucose.: 241 mg/dL (31 Mar 2025 04:06)  POCT Blood Glucose.: 217 mg/dL (30 Mar 2025 21:22)  POCT Blood Glucose.: 195 mg/dL (30 Mar 2025 16:55)  POCT Blood Glucose.: 228 mg/dL (30 Mar 2025 12:13)  POCT Blood Glucose.: 293 mg/dL (30 Mar 2025 08:26)    I&O's Summary    30 Mar 2025 07:01  -  31 Mar 2025 07:00  --------------------------------------------------------  IN: 0 mL / OUT: 600 mL / NET: -600 mL        Vital Signs Last 24 Hrs  T(C): 36.5 (31 Mar 2025 05:26), Max: 36.6 (30 Mar 2025 21:33)  T(F): 97.7 (31 Mar 2025 05:26), Max: 97.8 (30 Mar 2025 21:33)  HR: 78 (31 Mar 2025 05:26) (76 - 78)  BP: 151/91 (31 Mar 2025 05:26) (139/87 - 154/89)  BP(mean): --  RR: 17 (31 Mar 2025 05:26) (16 - 18)  SpO2: 98% (31 Mar 2025 05:26) (98% - 100%)    Parameters below as of 31 Mar 2025 05:26  Patient On (Oxygen Delivery Method): room air        PHYSICAL EXAM:  GENERAL: NAD, well-developed  HEAD: Atraumatic, Normocephalic  EYES: EOMI, PERRLA, conjunctiva and sclera clear  NECK: Supple, No JVD  CHEST/LUNG: Clear to auscultation bilaterally; No wheezes or crackles  HEART: Normal S1/S2; Regular rate and rhythm; No murmurs, rubs, or gallops  ABDOMEN: Soft, Nontender, Nondistended; Bowel sounds present  EXTREMITIES: 2+ Peripheral Pulses; No clubbing, cyanosis, or edema  PSYCH: A&Ox3  NEUROLOGY: no focal neurologic deficit  SKIN: No rashes or lesions    LABS:                        9.5    6.40  )-----------( 333      ( 30 Mar 2025 03:03 )             30.2      03-30    134[L]  |  99  |  16  ----------------------------<  277[H]  4.7   |  22  |  0.67    Ca    8.9      30 Mar 2025 03:03  Phos  3.3     03-30  Mg     1.90     03-30    TPro  6.7  /  Alb  3.0[L]  /  TBili  <0.2  /  DBili  x   /  AST  20  /  ALT  7   /  AlkPhos  102  03-30          Urinalysis Basic - ( 30 Mar 2025 03:03 )    Color: x / Appearance: x / SG: x / pH: x  Gluc: 277 mg/dL / Ketone: x  / Bili: x / Urobili: x   Blood: x / Protein: x / Nitrite: x   Leuk Esterase: x / RBC: x / WBC x   Sq Epi: x / Non Sq Epi: x / Bacteria: x        RADIOLOGY & ADDITIONAL TESTS:    Imaging Personally Reviewed:    Consultant(s) Notes Reviewed:      Care Discussed with Consultants/Other Providers:   Patient is a 55y old  Male who presents with a chief complaint of left foot gangrene (30 Mar 2025 15:17)      SUBJECTIVE / OVERNIGHT EVENTS: Pt seen and examined at bedside, resting comfortable. No acute events overnight. No complaints; denies pain, numbness, tingling of lower extremities. Denies SOB, fevers/chills. Tolerating po without n/v, ambulating.    MEDICATIONS  (STANDING):  atorvastatin 20 milliGRAM(s) Oral at bedtime  chlorhexidine 2% Cloths 1 Application(s) Topical daily  dextrose 5%. 1000 milliLiter(s) (100 mL/Hr) IV Continuous <Continuous>  dextrose 5%. 1000 milliLiter(s) (50 mL/Hr) IV Continuous <Continuous>  dextrose 50% Injectable 25 Gram(s) IV Push once  dextrose 50% Injectable 12.5 Gram(s) IV Push once  dextrose 50% Injectable 25 Gram(s) IV Push once  dextrose Oral Gel 15 Gram(s) Oral once  glucagon  Injectable 1 milliGRAM(s) IntraMuscular once  insulin glargine Injectable (LANTUS) 23 Unit(s) SubCutaneous <User Schedule>  insulin lispro (ADMELOG) corrective regimen sliding scale   SubCutaneous at bedtime  insulin lispro (ADMELOG) corrective regimen sliding scale   SubCutaneous three times a day before meals  insulin lispro Injectable (ADMELOG) 7 Unit(s) SubCutaneous three times a day before meals  lactated ringers. 1000 milliLiter(s) (100 mL/Hr) IV Continuous <Continuous>  mupirocin 2% Ointment 1 Application(s) Topical two times a day  piperacillin/tazobactam IVPB.. 3.375 Gram(s) IV Intermittent every 8 hours  vancomycin  IVPB 1250 milliGRAM(s) IV Intermittent every 12 hours    MEDICATIONS  (PRN):  acetaminophen     Tablet .. 650 milliGRAM(s) Oral every 6 hours PRN Temp greater or equal to 38C (100.4F), Mild Pain (1 - 3)  aluminum hydroxide/magnesium hydroxide/simethicone Suspension 30 milliLiter(s) Oral every 4 hours PRN Dyspepsia  melatonin 6 milliGRAM(s) Oral at bedtime PRN Insomnia      CAPILLARY BLOOD GLUCOSE      POCT Blood Glucose.: 241 mg/dL (31 Mar 2025 04:06)  POCT Blood Glucose.: 217 mg/dL (30 Mar 2025 21:22)  POCT Blood Glucose.: 195 mg/dL (30 Mar 2025 16:55)  POCT Blood Glucose.: 228 mg/dL (30 Mar 2025 12:13)  POCT Blood Glucose.: 293 mg/dL (30 Mar 2025 08:26)    I&O's Summary    30 Mar 2025 07:01  -  31 Mar 2025 07:00  --------------------------------------------------------  IN: 0 mL / OUT: 600 mL / NET: -600 mL        Vital Signs Last 24 Hrs  T(C): 36.5 (31 Mar 2025 05:26), Max: 36.6 (30 Mar 2025 21:33)  T(F): 97.7 (31 Mar 2025 05:26), Max: 97.8 (30 Mar 2025 21:33)  HR: 78 (31 Mar 2025 05:26) (76 - 78)  BP: 151/91 (31 Mar 2025 05:26) (139/87 - 154/89)  BP(mean): --  RR: 17 (31 Mar 2025 05:26) (16 - 18)  SpO2: 98% (31 Mar 2025 05:26) (98% - 100%)    Parameters below as of 31 Mar 2025 05:26  Patient On (Oxygen Delivery Method): room air        PHYSICAL EXAM:  GENERAL: NAD, well-developed  HEAD: Atraumatic, Normocephalic  EYES: 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 R eye conjunctiva and sclera clear  NECK: Supple, No JVD  CHEST/LUNG: Clear to auscultation bilaterally; No wheezes or crackles  HEART: Normal S1/S2; Regular rate and rhythm; No murmurs, rubs, or gallops  ABDOMEN: Soft, Nontender, Nondistended; Bowel sounds present  EXTREMITIES: R side DP 3/4, PT 3/4, warm temp of foot. L side DP 1/4, PT 3/4, warm/cool temp of foot. L foot 2nd digit full thickness dry gangrene, no wet changes, no malodor, no fluctuance. R foot posteromedial ankle wound wrapped.   PSYCH: A&Ox3  NEUROLOGY: no focal neurologic deficit  SKIN: No rashes or lesions    LABS:                        9.5    6.40  )-----------( 333      ( 30 Mar 2025 03:03 )             30.2      03-30    134[L]  |  99  |  16  ----------------------------<  277[H]  4.7   |  22  |  0.67    Ca    8.9      30 Mar 2025 03:03  Phos  3.3     03-30  Mg     1.90     03-30    TPro  6.7  /  Alb  3.0[L]  /  TBili  <0.2  /  DBili  x   /  AST  20  /  ALT  7   /  AlkPhos  102  03-30          Urinalysis Basic - ( 30 Mar 2025 03:03 )    Color: x / Appearance: x / SG: x / pH: x  Gluc: 277 mg/dL / Ketone: x  / Bili: x / Urobili: x   Blood: x / Protein: x / Nitrite: x   Leuk Esterase: x / RBC: x / WBC x   Sq Epi: x / Non Sq Epi: x / Bacteria: x        RADIOLOGY & ADDITIONAL TESTS:    Imaging Personally Reviewed:    Consultant(s) Notes Reviewed:      Care Discussed with Consultants/Other Providers:   Patient is a 55y old Male who presents with a chief complaint of left foot gangrene (30 Mar 2025 15:17)      SUBJECTIVE / OVERNIGHT EVENTS: Pt seen and examined at bedside, resting comfortable. No acute events overnight. No complaints; denies pain, numbness, tingling of lower extremities. Denies SOB, fevers/chills. Tolerating po without n/v, ambulating.    MEDICATIONS  (STANDING):  atorvastatin 20 milliGRAM(s) Oral at bedtime  chlorhexidine 2% Cloths 1 Application(s) Topical daily  dextrose 5%. 1000 milliLiter(s) (100 mL/Hr) IV Continuous <Continuous>  dextrose 5%. 1000 milliLiter(s) (50 mL/Hr) IV Continuous <Continuous>  dextrose 50% Injectable 25 Gram(s) IV Push once  dextrose 50% Injectable 12.5 Gram(s) IV Push once  dextrose 50% Injectable 25 Gram(s) IV Push once  dextrose Oral Gel 15 Gram(s) Oral once  glucagon  Injectable 1 milliGRAM(s) IntraMuscular once  insulin glargine Injectable (LANTUS) 23 Unit(s) SubCutaneous <User Schedule>  insulin lispro (ADMELOG) corrective regimen sliding scale   SubCutaneous at bedtime  insulin lispro (ADMELOG) corrective regimen sliding scale   SubCutaneous three times a day before meals  insulin lispro Injectable (ADMELOG) 7 Unit(s) SubCutaneous three times a day before meals  lactated ringers. 1000 milliLiter(s) (100 mL/Hr) IV Continuous <Continuous>  mupirocin 2% Ointment 1 Application(s) Topical two times a day  piperacillin/tazobactam IVPB.. 3.375 Gram(s) IV Intermittent every 8 hours  vancomycin  IVPB 1250 milliGRAM(s) IV Intermittent every 12 hours    MEDICATIONS  (PRN):  acetaminophen     Tablet .. 650 milliGRAM(s) Oral every 6 hours PRN Temp greater or equal to 38C (100.4F), Mild Pain (1 - 3)  aluminum hydroxide/magnesium hydroxide/simethicone Suspension 30 milliLiter(s) Oral every 4 hours PRN Dyspepsia  melatonin 6 milliGRAM(s) Oral at bedtime PRN Insomnia      CAPILLARY BLOOD GLUCOSE      POCT Blood Glucose.: 241 mg/dL (31 Mar 2025 04:06)  POCT Blood Glucose.: 217 mg/dL (30 Mar 2025 21:22)  POCT Blood Glucose.: 195 mg/dL (30 Mar 2025 16:55)  POCT Blood Glucose.: 228 mg/dL (30 Mar 2025 12:13)  POCT Blood Glucose.: 293 mg/dL (30 Mar 2025 08:26)    I&O's Summary    30 Mar 2025 07:01  -  31 Mar 2025 07:00  --------------------------------------------------------  IN: 0 mL / OUT: 600 mL / NET: -600 mL        Vital Signs Last 24 Hrs  T(C): 36.5 (31 Mar 2025 05:26), Max: 36.6 (30 Mar 2025 21:33)  T(F): 97.7 (31 Mar 2025 05:26), Max: 97.8 (30 Mar 2025 21:33)  HR: 78 (31 Mar 2025 05:26) (76 - 78)  BP: 151/91 (31 Mar 2025 05:26) (139/87 - 154/89)  BP(mean): --  RR: 17 (31 Mar 2025 05:26) (16 - 18)  SpO2: 98% (31 Mar 2025 05:26) (98% - 100%)    Parameters below as of 31 Mar 2025 05:26  Patient On (Oxygen Delivery Method): room air        PHYSICAL EXAM:  GENERAL: NAD, well-developed  HEAD: Atraumatic, Normocephalic  EYES: 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 R eye conjunctiva and sclera clear  NECK: Supple, No JVD  CHEST/LUNG: Clear to auscultation bilaterally; No wheezes or crackles  HEART: Normal S1/S2; Regular rate and rhythm; No murmurs, rubs, or gallops  ABDOMEN: Soft, Nontender, Nondistended; Bowel sounds present  EXTREMITIES: R side DP 3/4, PT 3/4, warm temp of foot. L side DP 1/4, PT 3/4, warm/cool temp of foot. L foot 2nd digit full thickness dry gangrene, no wet changes, no malodor, no fluctuance. R foot posteromedial ankle wound wrapped.   PSYCH: A&Ox3  NEUROLOGY: no focal neurologic deficit  SKIN: No rashes or lesions    LABS:                        9.5    6.40  )-----------( 333      ( 30 Mar 2025 03:03 )             30.2      03-30    134[L]  |  99  |  16  ----------------------------<  277[H]  4.7   |  22  |  0.67    Ca    8.9      30 Mar 2025 03:03  Phos  3.3     03-30  Mg     1.90     03-30    TPro  6.7  /  Alb  3.0[L]  /  TBili  <0.2  /  DBili  x   /  AST  20  /  ALT  7   /  AlkPhos  102  03-30          Urinalysis Basic - ( 30 Mar 2025 03:03 )    Color: x / Appearance: x / SG: x / pH: x  Gluc: 277 mg/dL / Ketone: x  / Bili: x / Urobili: x   Blood: x / Protein: x / Nitrite: x   Leuk Esterase: x / RBC: x / WBC x   Sq Epi: x / Non Sq Epi: x / Bacteria: x        RADIOLOGY & ADDITIONAL TESTS:    Imaging Personally Reviewed: no new imaging    Consultant(s) Notes Reviewed: yes    Care Discussed with Consultants/Other Providers: yes

## 2025-03-31 NOTE — PROGRESS NOTE ADULT - PROBLEM SELECTOR PLAN 3
- initially elevated glucoses and BHB NOW RESOLVED however still with persistent elevation in BHB, received 2 liters LR on 3/28 with resolution of BHB  - will trial 1L LR on 3/29- re-check BHB  - IVF as needed for BHB, pt tolerating PO  - 3/30 additional 500cc (100/hr x5h)  - Endocrine following -> lantus 20, premeal 7 and ISS Pt with normocytic anemia (Hgb trending downward slightly since admission from 11.1 to 9.4). Previous hospitalization December 2024 show Hgb around 9.5 (as it is currently). Iron studies from that admission show low iron at 26 with elevated TIBC of 124, normal ferritin of 161, likely consistent with anemia of chronic disease 2/2 to T2DM and associated lower extremity inflammation.     - ?order iron studies Pt with normocytic anemia (Hgb trending downward slightly since admission from 11.1 to 9.4). Previous hospitalization December 2024 show Hgb around 9.5 (as it is currently). Iron studies from that admission show low iron at 26 with elevated TIBC of 124, normal ferritin of 161, likely consistent with anemia of chronic disease 2/2 to T2DM and associated lower extremity inflammation. However, important to rule out blood loss etiology, ie GI bleed.    - ask pt when was last colonoscopy; f/u outpt GI

## 2025-03-31 NOTE — PROGRESS NOTE ADULT - PROBLEM SELECTOR PLAN 2
h/o gastric bypass for the sake of blood sugar control. Pt denies diarrhea, constipation, any GI sx. Initially elevated glucoses and DKA, BHB NOW RESOLVED; persistent BHB elevation, received 2 liters LR on 3/28 with resolution of BHB    - ?draw another VBG  - IVF as needed for BHB, pt tolerating PO  - 3/30 additional 500cc (100/hr x5h)  - Endocrine following -> lantus 23, premeal 7 and ISS Initially elevated glucoses and DKA, BHB NOW RESOLVED; persistent BHB elevation, received 2 liters LR on 3/28 with resolution of BHB    - IVF as needed for BHB, pt tolerating PO  - 3/30 additional 500cc (100/hr x5h)  - Endocrine following -> lantus 23, premeal 7 and ISS

## 2025-04-01 DIAGNOSIS — Z01.818 ENCOUNTER FOR OTHER PREPROCEDURAL EXAMINATION: ICD-10-CM

## 2025-04-01 LAB
ALBUMIN SERPL ELPH-MCNC: 3 G/DL — LOW (ref 3.3–5)
ALP SERPL-CCNC: 102 U/L — SIGNIFICANT CHANGE UP (ref 40–120)
ALT FLD-CCNC: 10 U/L — SIGNIFICANT CHANGE UP (ref 4–41)
ANION GAP SERPL CALC-SCNC: 14 MMOL/L — SIGNIFICANT CHANGE UP (ref 7–14)
APTT BLD: 32.5 SEC — SIGNIFICANT CHANGE UP (ref 24.5–35.6)
AST SERPL-CCNC: 12 U/L — SIGNIFICANT CHANGE UP (ref 4–40)
BILIRUB SERPL-MCNC: <0.2 MG/DL — SIGNIFICANT CHANGE UP (ref 0.2–1.2)
BUN SERPL-MCNC: 24 MG/DL — HIGH (ref 7–23)
CALCIUM SERPL-MCNC: 8.9 MG/DL — SIGNIFICANT CHANGE UP (ref 8.4–10.5)
CHLORIDE SERPL-SCNC: 103 MMOL/L — SIGNIFICANT CHANGE UP (ref 98–107)
CO2 SERPL-SCNC: 20 MMOL/L — LOW (ref 22–31)
CREAT SERPL-MCNC: 0.72 MG/DL — SIGNIFICANT CHANGE UP (ref 0.5–1.3)
CULTURE RESULTS: SIGNIFICANT CHANGE UP
CULTURE RESULTS: SIGNIFICANT CHANGE UP
EGFR: 109 ML/MIN/1.73M2 — SIGNIFICANT CHANGE UP
EGFR: 109 ML/MIN/1.73M2 — SIGNIFICANT CHANGE UP
GLUCOSE BLDC GLUCOMTR-MCNC: 189 MG/DL — HIGH (ref 70–99)
GLUCOSE BLDC GLUCOMTR-MCNC: 190 MG/DL — HIGH (ref 70–99)
GLUCOSE BLDC GLUCOMTR-MCNC: 199 MG/DL — HIGH (ref 70–99)
GLUCOSE BLDC GLUCOMTR-MCNC: 210 MG/DL — HIGH (ref 70–99)
GLUCOSE BLDC GLUCOMTR-MCNC: 225 MG/DL — HIGH (ref 70–99)
GLUCOSE SERPL-MCNC: 235 MG/DL — HIGH (ref 70–99)
HCT VFR BLD CALC: 29.8 % — LOW (ref 39–50)
HGB BLD-MCNC: 9.5 G/DL — LOW (ref 13–17)
INR BLD: 0.9 RATIO — SIGNIFICANT CHANGE UP (ref 0.85–1.16)
MAGNESIUM SERPL-MCNC: 1.9 MG/DL — SIGNIFICANT CHANGE UP (ref 1.6–2.6)
MCHC RBC-ENTMCNC: 27.1 PG — SIGNIFICANT CHANGE UP (ref 27–34)
MCHC RBC-ENTMCNC: 31.9 G/DL — LOW (ref 32–36)
MCV RBC AUTO: 84.9 FL — SIGNIFICANT CHANGE UP (ref 80–100)
NRBC # BLD AUTO: 0 K/UL — SIGNIFICANT CHANGE UP (ref 0–0)
NRBC # FLD: 0 K/UL — SIGNIFICANT CHANGE UP (ref 0–0)
NRBC BLD AUTO-RTO: 0 /100 WBCS — SIGNIFICANT CHANGE UP (ref 0–0)
PHOSPHATE SERPL-MCNC: 3.2 MG/DL — SIGNIFICANT CHANGE UP (ref 2.5–4.5)
PLATELET # BLD AUTO: 351 K/UL — SIGNIFICANT CHANGE UP (ref 150–400)
POTASSIUM SERPL-MCNC: 4 MMOL/L — SIGNIFICANT CHANGE UP (ref 3.5–5.3)
POTASSIUM SERPL-SCNC: 4 MMOL/L — SIGNIFICANT CHANGE UP (ref 3.5–5.3)
PROT SERPL-MCNC: 6.6 G/DL — SIGNIFICANT CHANGE UP (ref 6–8.3)
PROTHROM AB SERPL-ACNC: 10.7 SEC — SIGNIFICANT CHANGE UP (ref 9.9–13.4)
RBC # BLD: 3.51 M/UL — LOW (ref 4.2–5.8)
RBC # FLD: 12.4 % — SIGNIFICANT CHANGE UP (ref 10.3–14.5)
SODIUM SERPL-SCNC: 137 MMOL/L — SIGNIFICANT CHANGE UP (ref 135–145)
SPECIMEN SOURCE: SIGNIFICANT CHANGE UP
SPECIMEN SOURCE: SIGNIFICANT CHANGE UP
WBC # BLD: 6.81 K/UL — SIGNIFICANT CHANGE UP (ref 3.8–10.5)
WBC # FLD AUTO: 6.81 K/UL — SIGNIFICANT CHANGE UP (ref 3.8–10.5)

## 2025-04-01 PROCEDURE — 99232 SBSQ HOSP IP/OBS MODERATE 35: CPT

## 2025-04-01 PROCEDURE — 99232 SBSQ HOSP IP/OBS MODERATE 35: CPT | Mod: GC

## 2025-04-01 RX ORDER — INSULIN LISPRO 100 U/ML
9 INJECTION, SOLUTION INTRAVENOUS; SUBCUTANEOUS
Refills: 0 | Status: DISCONTINUED | OUTPATIENT
Start: 2025-04-01 | End: 2025-04-03

## 2025-04-01 RX ORDER — INSULIN GLARGINE-YFGN 100 [IU]/ML
25 INJECTION, SOLUTION SUBCUTANEOUS
Refills: 0 | Status: DISCONTINUED | OUTPATIENT
Start: 2025-04-02 | End: 2025-04-02

## 2025-04-01 RX ADMIN — INSULIN LISPRO 2: 100 INJECTION, SOLUTION INTRAVENOUS; SUBCUTANEOUS at 08:43

## 2025-04-01 RX ADMIN — MUPIROCIN CALCIUM 1 APPLICATION(S): 20 CREAM TOPICAL at 05:40

## 2025-04-01 RX ADMIN — Medication 1 APPLICATION(S): at 12:35

## 2025-04-01 RX ADMIN — MUPIROCIN CALCIUM 1 APPLICATION(S): 20 CREAM TOPICAL at 18:51

## 2025-04-01 RX ADMIN — INSULIN LISPRO 8 UNIT(S): 100 INJECTION, SOLUTION INTRAVENOUS; SUBCUTANEOUS at 08:44

## 2025-04-01 RX ADMIN — INSULIN LISPRO 8 UNIT(S): 100 INJECTION, SOLUTION INTRAVENOUS; SUBCUTANEOUS at 12:34

## 2025-04-01 RX ADMIN — ATORVASTATIN CALCIUM 20 MILLIGRAM(S): 80 TABLET, FILM COATED ORAL at 22:05

## 2025-04-01 RX ADMIN — INSULIN LISPRO 1: 100 INJECTION, SOLUTION INTRAVENOUS; SUBCUTANEOUS at 17:58

## 2025-04-01 RX ADMIN — INSULIN LISPRO 9 UNIT(S): 100 INJECTION, SOLUTION INTRAVENOUS; SUBCUTANEOUS at 17:58

## 2025-04-01 RX ADMIN — INSULIN LISPRO 1: 100 INJECTION, SOLUTION INTRAVENOUS; SUBCUTANEOUS at 12:34

## 2025-04-01 RX ADMIN — INSULIN GLARGINE-YFGN 25 UNIT(S): 100 INJECTION, SOLUTION SUBCUTANEOUS at 08:41

## 2025-04-01 NOTE — PROGRESS NOTE ADULT - ASSESSMENT
56 y/o M with PMH of DM2, sent in by podiatrist Dr. 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 Admitted for management of L foot dry gangrene / evaluation for possible amputation, found to have mild DKA resolving s/p ademlog 10u and initiation of inpatient insulin regimen, receiving vanc and zosyn while awaiting left foot second partial ray resection with Dr. Osuna on Wed 4/2 at 2.

## 2025-04-01 NOTE — PROGRESS NOTE ADULT - ASSESSMENT
The patient is a 55y Male with PMH of DM who presented to the hospital with LE infection.  Endocrinology consulted for hyperglycemia    #Uncontrolled Type 2 Diabetes Mellitus with hyperglycemia and   #DKA, mild (resolving)  - Follows with: Dr. Hernandez  - A1C with Estimated Average Glucose Result: 16.7 % (03-26-25)  - home regimen: Lantus 12 units (3/29: pt states he want non-adherent with Lantus as he was concerned about the side effect of insulin. Pt educated on importance of skipping medication and DM complications), not compliant with Admelog moderate dose sliding scale; has dexcom G7 at home   - eGFR: 113 mL/min/1.73m2 (03-27-25)  - Weight (kg): 64 (03-27-25)  - glucose elevated, mild DKA on presentation showing improvement      INPATIENT PLAN:  - Inpatient BG goal 100-180mg/dl: FS above goal, but with overall improvement.   - Will keep Lantus dose 25 units and moved it ot 6am since pt will be NPO after MN for surgery tomorrow- please make sure pt gets his morning lantus dose prior to being brought down to OR.   - Increase Admelog to 9 units TID before meals (HOLD if NPO or not eating)  - Continue low Admelog correctional scale TID AC  - Continue separate low Admelog correctional scale at HS  - Please check FSG before meals and QHS, or q6h while NPO; check 3 am FS   - consistent carb diet when eating  - nutrition consult placed  - Hypoglycemia Protocol   - Educated patient to please wait for FS prior to eating and to avoid eating between meals.  - BHB normal today (0.4 on 3/29 @ 12:13). No need to trend BHB unless bicard or pH is abnormal.       DISCHARGE PLANNING:  - Discharge recs basal bolus insulin, doses pending clinical course  - will need Endocrinology follow up. Please provide clinic info in DC paperwork for patient to make an appointment: - Patient to call [facility] at [place]:   [address] [address]  [place] [address] [phone]    #Hyperlipidemia  - LDL goal <70  - Last LDL: 132 mg/dL (12-16-24)  - check lipid panel   -c/w lipitor 20 mg daily   -lipid panel yearly outpt  - defer management to primary team    d/w primary team Dr. Trisha Mirza, [facility]  Nurse Practitioner  Division of Endocrinology & Diabetes  Contact on TEAMS    If out of hospital/unavailable when paged, please note: patient will be cared for by another provider on the endocrine service.  For urgent concerns: call the endocrine answering service for assistance to reach covering provider ([phone]). For non-urgent matters: please email [email or web address] for assistance.

## 2025-04-01 NOTE — PROGRESS NOTE ADULT - PROBLEM SELECTOR PLAN 1
L foot 2nd digit gangrene. L foot xray showed no gas, no osteomyelitis. Podiatry following; left foot partial 2nd ray resection with Dr. Osuna on Wed 4/2 at 2.   No signs of infection on L foot 2nd toe or R posteromedial ankle wound to subQ, no more leukocytosis, negative MRSA swab, no growth on blood cultures; therefore ok to d/c Abx (cleared with podiatry). Called podiatry to advance surgery date; they are unable to given it is non-emergent.    - d/c IV zosyn + vancomycin (cover for gram+ cocci gram- rods, anaerobes, MRSA)  - f/u INDY/PVR -> no occlusion/significant stenosis  - f/u blood cultures 3/31 -> NG  - f/u vascular consult -> no contraindication to surgery

## 2025-04-01 NOTE — PROGRESS NOTE ADULT - PROBLEM SELECTOR PLAN 2
Initially elevated glucoses and DKA, BHB NOW RESOLVED; persistent BHB elevation, received 2 liters LR on 3/28 with resolution of BHB    - IVF as needed for BHB, pt tolerating PO  - 3/30 additional 500cc (100/hr x5h)  - Endocrine following -> lantus 23, premeal 7 and ISS

## 2025-04-01 NOTE — PROGRESS NOTE ADULT - SUBJECTIVE AND OBJECTIVE BOX
Chief Complaint: type 2 DM    History: Tolerating PO diet. FS above goal, but with overall improvement.     MEDICATIONS  (STANDING):  atorvastatin 20 milliGRAM(s) Oral at bedtime  chlorhexidine 2% Cloths 1 Application(s) Topical daily  dextrose 5%. 1000 milliLiter(s) (100 mL/Hr) IV Continuous <Continuous>  dextrose 5%. 1000 milliLiter(s) (50 mL/Hr) IV Continuous <Continuous>  dextrose 50% Injectable 25 Gram(s) IV Push once  dextrose 50% Injectable 12.5 Gram(s) IV Push once  dextrose 50% Injectable 25 Gram(s) IV Push once  dextrose Oral Gel 15 Gram(s) Oral once  glucagon  Injectable 1 milliGRAM(s) IntraMuscular once  insulin lispro (ADMELOG) corrective regimen sliding scale   SubCutaneous at bedtime  insulin lispro (ADMELOG) corrective regimen sliding scale   SubCutaneous three times a day before meals  insulin lispro Injectable (ADMELOG) 9 Unit(s) SubCutaneous three times a day before meals  lactated ringers. 1000 milliLiter(s) (100 mL/Hr) IV Continuous <Continuous>  mupirocin 2% Ointment 1 Application(s) Topical two times a day    MEDICATIONS  (PRN):  acetaminophen     Tablet .. 650 milliGRAM(s) Oral every 6 hours PRN Temp greater or equal to 38C (100.4F), Mild Pain (1 - 3)  aluminum hydroxide/magnesium hydroxide/simethicone Suspension 30 milliLiter(s) Oral every 4 hours PRN Dyspepsia  melatonin 6 milliGRAM(s) Oral at bedtime PRN Insomnia      Allergies    No Known Allergies    Intolerances      Review of Systems:  Cardiovascular: No chest pain, palpitations  Respiratory: No SOB, no cough  GI: No nausea, vomiting, abdominal pain  : No dysuria  Endocrine: no polyuria, polydipsia      PHYSICAL EXAM:  VITALS: T(C): 36.4 (04-01-25 @ 13:11)  T(F): 97.5 (04-01-25 @ 13:11), Max: 97.9 (04-01-25 @ 05:39)  HR: 73 (04-01-25 @ 13:11) (70 - 73)  BP: 126/71 (04-01-25 @ 13:11) (126/71 - 149/88)  RR:  (17 - 18)  SpO2:  (97% - 99%)  Wt(kg): --  GENERAL: NAD, well-groomed, well-developed  EYES: No proptosis  HEENT:  Atraumatic, Normocephalic  RESPIRATORY: non labored breathing   CARDIOVASCULAR: Regular rate and rhythm  GI: Soft, nontender, non distended  PSYCH: Alert and oriented x 3, normal affect, normal mood       CAPILLARY BLOOD GLUCOSE      POCT Blood Glucose.: 199 mg/dL (01 Apr 2025 12:12)  POCT Blood Glucose.: 225 mg/dL (01 Apr 2025 08:19)  POCT Blood Glucose.: 210 mg/dL (01 Apr 2025 04:16)  POCT Blood Glucose.: 180 mg/dL (31 Mar 2025 21:15)  POCT Blood Glucose.: 179 mg/dL (31 Mar 2025 17:15)      04-01    137  |  103  |  24[H]  ----------------------------<  235[H]  4.0   |  20[L]  |  0.72    eGFR: 109    Ca    8.9      04-01  Mg     1.90     04-01  Phos  3.2     04-01    TPro  6.6  /  Alb  3.0[L]  /  TBili  <0.2  /  DBili  x   /  AST  12  /  ALT  10  /  AlkPhos  102  04-01          Thyroid Function Tests:        A1C with Estimated Average Glucose Result: 16.7 % (03-26-25 @ 22:23)  A1C with Estimated Average Glucose Result: 12.5 % (12-14-24 @ 00:48)          Diet, NPO after Midnight:      NPO Start Date: 01-Apr-2025,   NPO Start Time: 23:59  Except Medications  With Ice Chips/Sips of Water (04-01-25 @ 11:32)  Diet, Consistent Carbohydrate/No Snacks (03-27-25 @ 15:44)

## 2025-04-01 NOTE — PROGRESS NOTE ADULT - PROBLEM SELECTOR PLAN 3
Pt with normocytic anemia (Hgb trending downward slightly since admission from 11.1 to 9.4). Previous hospitalization December 2024 show Hgb around 9.5 (as it is currently). Iron studies from that admission show low iron at 26 with elevated TIBC of 124, normal ferritin of 161, likely consistent with anemia of chronic disease 2/2 to T2DM and associated lower extremity inflammation. Last colonoscopy May 2024, negative; GI bleed less likely etiology. RCRI: 0   3.9% Risk of Major Cardiac Event  Fitzpatrick: 0.1% risk of Myocardial infarction or cardiac arrest, intraoperatively or up to 30 days post op    Patient is medically optimized for procedure on 4/2/25

## 2025-04-01 NOTE — PROGRESS NOTE ADULT - ASSESSMENT
56 y/o M with PMH of DM2, sent in by podiatrist Dr. 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 Admitted for management of L foot dry gangrene / evaluation for possible amputation, found to have mild DKA resolving s/p ademlog 10u and initiation of inpatient insulin regimen, receiving vanc and zosyn while awaiting left foot second partial ray resection with Dr. Osuna on Wed 4/2 at 2.     54 y/o M with PMH of DM2, sent in by podiatrist Dr. 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 Admitted for management of L foot dry gangrene / evaluation for possible amputation, found to have mild DKA resolved s/p ademlog 10u and initiation of inpatient insulin regimen, s/p vanc and zosyn. Awaiting left foot second partial ray resection with Dr. Osuna on Wed 4/2 at 2.

## 2025-04-01 NOTE — PROGRESS NOTE ADULT - PROBLEM SELECTOR PLAN 2
Initially elevated glucoses and DKA, BHB NOW RESOLVED; persistent BHB elevation, received 2 liters LR on 3/28 with resolution of BHB    - IVF as needed for BHB, pt tolerating PO  - 3/30 additional 500cc (100/hr x5h)  - Endocrine following -> lantus 23, premeal 7 and ISS Initially elevated glucoses and DKA, BHB NOW RESOLVED; persistent BHB elevation, received 2 liters LR on 3/28 with resolution of BHB    - IVF as needed for BHB, pt tolerating PO  - 3/30 additional 500cc (100/hr x5h)  - Endocrine following -> lantus 25, premeal 8 and ISS Initially elevated glucoses and DKA, BHB NOW RESOLVED; persistent BHB elevation, received 2 liters LR on 3/28 with resolution of BHB    - IVF as needed for BHB, pt tolerating PO  - 3/30 additional 500cc (100/hr x5h)  - Endocrine following -> lantus 25, premeal 8 and ISS  - f/u A1C

## 2025-04-01 NOTE — PROGRESS NOTE ADULT - ASSESSMENT
55M presents for left foot 2nd digit gangrene  -Pt was seen and evaluated  -Afebrile, no leukocytosis   -Left foot 2nd digit full thickness dry gangrene, no wet changes, no malodor, no fluctuance, right foot posteromedial ankle wound to subQ, no drainage, no fluctuance, no malodor  -Left foot X-ray: no gas, no OM   -No culture obtained 2/2 no signs of acute infection  - INDY/PVR: RABI 1.32 RTBI 0.73 LABI 1.18 LTBI 0.44   -Vascular recs appreciated, okay to proceed with podiatric surgery  -Pod Plan: left foot partial 2nd ray resection wed 4/2 at 11:30am with Dr. Osuna   - NPO after midnight   - CBC, BMP, Coags, Type and Screen in AM   -Please document medical clearance for possible podiatric surgery under anesthesia  -Discussed with attending

## 2025-04-01 NOTE — PROGRESS NOTE ADULT - SUBJECTIVE AND OBJECTIVE BOX
Patient is a 55y old  Male who presents with a chief complaint of left foot gangrene (31 Mar 2025 13:16)      SUBJECTIVE / OVERNIGHT EVENTS:    MEDICATIONS  (STANDING):  atorvastatin 20 milliGRAM(s) Oral at bedtime  chlorhexidine 2% Cloths 1 Application(s) Topical daily  dextrose 5%. 1000 milliLiter(s) (100 mL/Hr) IV Continuous <Continuous>  dextrose 5%. 1000 milliLiter(s) (50 mL/Hr) IV Continuous <Continuous>  dextrose 50% Injectable 25 Gram(s) IV Push once  dextrose 50% Injectable 12.5 Gram(s) IV Push once  dextrose 50% Injectable 25 Gram(s) IV Push once  dextrose Oral Gel 15 Gram(s) Oral once  glucagon  Injectable 1 milliGRAM(s) IntraMuscular once  insulin glargine Injectable (LANTUS) 25 Unit(s) SubCutaneous <User Schedule>  insulin lispro (ADMELOG) corrective regimen sliding scale   SubCutaneous three times a day before meals  insulin lispro (ADMELOG) corrective regimen sliding scale   SubCutaneous at bedtime  insulin lispro Injectable (ADMELOG) 8 Unit(s) SubCutaneous three times a day before meals  lactated ringers. 1000 milliLiter(s) (100 mL/Hr) IV Continuous <Continuous>  mupirocin 2% Ointment 1 Application(s) Topical two times a day    MEDICATIONS  (PRN):  acetaminophen     Tablet .. 650 milliGRAM(s) Oral every 6 hours PRN Temp greater or equal to 38C (100.4F), Mild Pain (1 - 3)  aluminum hydroxide/magnesium hydroxide/simethicone Suspension 30 milliLiter(s) Oral every 4 hours PRN Dyspepsia  melatonin 6 milliGRAM(s) Oral at bedtime PRN Insomnia      CAPILLARY BLOOD GLUCOSE      POCT Blood Glucose.: 210 mg/dL (01 Apr 2025 04:16)  POCT Blood Glucose.: 180 mg/dL (31 Mar 2025 21:15)  POCT Blood Glucose.: 179 mg/dL (31 Mar 2025 17:15)  POCT Blood Glucose.: 264 mg/dL (31 Mar 2025 12:06)  POCT Blood Glucose.: 249 mg/dL (31 Mar 2025 08:34)    I&O's Summary      Vital Signs Last 24 Hrs  T(C): 36.6 (01 Apr 2025 05:39), Max: 36.7 (31 Mar 2025 12:00)  T(F): 97.9 (01 Apr 2025 05:39), Max: 98.1 (31 Mar 2025 12:00)  HR: 70 (01 Apr 2025 05:39) (70 - 75)  BP: 131/77 (01 Apr 2025 05:39) (131/77 - 149/88)  BP(mean): --  RR: 18 (01 Apr 2025 05:39) (18 - 18)  SpO2: 97% (01 Apr 2025 05:39) (97% - 99%)    Parameters below as of 01 Apr 2025 05:39  Patient On (Oxygen Delivery Method): room air        PHYSICAL EXAM:  GENERAL: NAD, well-developed  HEAD: Atraumatic, Normocephalic  EYES: EOMI, PERRLA, conjunctiva and sclera clear  NECK: Supple, No JVD  CHEST/LUNG: Clear to auscultation bilaterally; No wheezes or crackles  HEART: Normal S1/S2; Regular rate and rhythm; No murmurs, rubs, or gallops  ABDOMEN: Soft, Nontender, Nondistended; Bowel sounds present  EXTREMITIES: 2+ Peripheral Pulses; No clubbing, cyanosis, or edema  PSYCH: A&Ox3  NEUROLOGY: no focal neurologic deficit  SKIN: No rashes or lesions    LABS:                        9.4    6.85  )-----------( 318      ( 31 Mar 2025 06:55 )             29.5      03-31    136  |  100  |  18  ----------------------------<  278[H]  4.3   |  24  |  0.70    Ca    8.7      31 Mar 2025 06:55  Phos  3.3     03-31  Mg     2.00     03-31    TPro  6.6  /  Alb  3.0[L]  /  TBili  <0.2  /  DBili  x   /  AST  13  /  ALT  12  /  AlkPhos  109  03-31          Urinalysis Basic - ( 31 Mar 2025 06:55 )    Color: x / Appearance: x / SG: x / pH: x  Gluc: 278 mg/dL / Ketone: x  / Bili: x / Urobili: x   Blood: x / Protein: x / Nitrite: x   Leuk Esterase: x / RBC: x / WBC x   Sq Epi: x / Non Sq Epi: x / Bacteria: x        RADIOLOGY & ADDITIONAL TESTS:    Imaging Personally Reviewed:    Consultant(s) Notes Reviewed:      Care Discussed with Consultants/Other Providers:

## 2025-04-01 NOTE — PROGRESS NOTE ADULT - PROBLEM SELECTOR PLAN 5
INPATIENT PROGRESS NOTES    PATIENT NAME: Ardyce Cheadle  MRN: [id number]  SERVICE DATE:  October 29, 2021   SERVICE TIME:  9:35 AM      PRIMARY SERVICE: Pulmonary Disease    CHIEF COMPLAINTS: Hypoxia    INTERVAL HPI: Patient seen and examined at bedside, Interval Notes, orders reviewed. Nursing notes noted    He did not want to talk or answer questions, sleeping, no issues overnight, on 4 L O2 saturation 90%, no reported chest pain, no nausea no vomiting, no fever. Review of system:     GI Abdominal pain No  Skin Rash No    Social History     Tobacco Use    Smoking status: Former Smoker    Smokeless tobacco: Never Used   Substance Use Topics    Alcohol use: No     History reviewed. No pertinent family history. OBJECTIVE    There is no height or weight on file to calculate BMI. PHYSICAL EXAM:  Vitals:  BP (!) 169/72   Pulse 77   Temp 97.7 °F (36.5 °C) (Oral)   Resp 20   SpO2 (!) 83%     General: alert, cooperative, no distress  Head: normocephalic, atraumatic  Eyes:No gross abnormalities. ENT:  MMM no lesions  Neck:  supple and no masses  Chest : Diminished air sounds, minimal bibasilar rales, no wheezing, nontender, tympanic  Heart[de-identified] Heart sounds are normal.  Regular rate and rhythm without murmur, gallop or rub. ABD:  symmetric, soft, non-tender, no guarding or rebound  Musculoskeletal : no cyanosis, no clubbing and no edema  Neuro:  Grossly normal  Skin: No rashes or nodules noted.   Lymph node:  no cervical nodes  Urology: No Delacruz   Psychiatric: appropriate    DATA:   Recent Labs     10/27/21  1415 10/28/21  0734   WBC 6.4 7.4   HGB 14.8 13.6*   HCT 44.6 41.6*   MCV 94.7 96.0    218     Recent Labs     10/27/21  1415 10/27/21  1415 10/27/21  2051 10/28/21  0734     --   --  134*   K 4.8  --   --  4.0     --   --  97   CO2 26  --   --  21   BUN 28*  --   --  28*   CREATININE 1.30*  --   --  1.17   GLUCOSE 134*   < > 97 99   CALCIUM 9.7  --   --  9.0   PROT 7.7  --   --   -- LABALBU 4.5  --   --  3.8   BILITOT 1.4*  --   --   --    ALKPHOS 72  --   --   --    AST 20  --   --   --    ALT 11  --   --   --    LABGLOM 51.6*   < >  --  58.2*   GFRAA >60.0   < >  --  >60.0   GLOB 3.2  --   --   --     < > = values in this interval not displayed. MV Settings:          Recent Labs     10/28/21  0950   PHART 7.367   MLU4MWR 42   PO2ART 86*   SIF4PVC 23.9   BEART -1   E1SYIMGQ 96*       O2 Device: Nasal cannula  O2 Flow Rate (L/min): 4 L/min    ADULT DIET; Regular; 4 carb choices (60 gm/meal); No Caffeine     MEDICATIONS during current hospitalization:    Continuous Infusions:   dextrose      sodium chloride         Scheduled Meds:   ipratropium-albuterol  1 ampule Inhalation TID    sodium chloride flush  5-40 mL IntraVENous 2 times per day    aspirin  81 mg Oral Daily    atorvastatin  40 mg Oral Nightly    insulin lispro  0-6 Units SubCUTAneous TID WC    insulin lispro  0-3 Units SubCUTAneous Nightly    apixaban  5 mg Oral BID    ursodiol  300 mg Oral BID    carvedilol  6.25 mg Oral BID WC    Vitamin D  2,000 Units Oral Dinner    folic acid  1 mg Oral Daily    levothyroxine  25 mcg Oral Daily    ferrous sulfate  325 mg Oral Every Other Day    pantoprazole  40 mg Oral QAM AC       PRN Meds:albuterol, glucose, dextrose, glucagon (rDNA), dextrose, sodium chloride flush, sodium chloride, ondansetron **OR** ondansetron, acetaminophen **OR** acetaminophen, polyethylene glycol, nitroGLYCERIN    Radiology  CT HEAD WO CONTRAST    Result Date: 10/29/2021  EXAMINATION:  CT HEAD WO CONTRAST HISTORY:  History of right occipital stroke. TECHNIQUE:  Serial axial images without IV contrast were obtained from the vertex to the foramen magnum. Sagittal and coronal reconstructions. All CT scans at this facility use dose modulation, iterative reconstruction, and/or weight based dosing when appropriate to reduce radiation dose to as low as reasonably achievable. COMPARISON:  CT 9/16/2021. RESULT: Acute change/chronic change:   Interval evolution of the right occipital lobe infarct. No associated hemorrhagic transformation or evidence for acute infarct elsewhere. Chronic lacunar infarcts bilateral basal ganglia and bilateral cerebellum, similar to  prior. Extensive chronic microvascular ischemic changes, similar to prior. Vascular calcifications. Hemorrhage:    No evidence of acute intracranial hemorrhage. Mass Lesion / Mass Effect:   There is no evidence of an intracranial mass or extraaxial fluid collection. No significant mass effect. Parenchyma: There is mild generalized volume loss. The brain parenchyma is otherwise within normal limits for age. Ventricles:   Ventricular enlargement concordant with the degree of parenchymal volume loss. Paranasal sinuses and skull base:  Polypoid thickening right maxillary sinus. Other areas of mild mucosal thickening. Left mastoidectomy, grossly unchanged. Small right mastoid effusion. No evidence for acute fracture. Bilateral lens replacement surgery. Soft tissues unremarkable. Interval evolution of the right occipital infarct compared to prior study. No CT evidence for new acute infarct elsewhere or hemorrhagic transformation. Other chronic changes similar to prior. CTA CHEST W WO CONTRAST    Result Date: 10/27/2021  EXAM: CTA CHEST W WO CONTRAST History: Hypoxia. Altered mental status. Pulmonary embolism. Pneumonia. Technique: Multiple contiguous axial images of the chest were obtained from the thoracic inlet through the upper abdomen with contrast. Multiplanar reformats including maximum intensity projection images (MIPS) were obtained. Comparison: Portable chest radiograph October 27, 2021. CT chest July 5, 2019 Findings: Visualized portion of the thyroid gland is within normal limits. No axillary, mediastinal, or hilar lymphadenopathy. No thoracic aortic aneurysm.  Limited evaluation for aortic dissection given the suboptimal contrast opacification of the aorta. Given this, no dissection identified. Atherosclerotic calcification of the thoracic aorta. No filling defect within the pulmonary arteries. Pacemaker is present. Postsurgical changes of CABG. Heart size is within normal limits. No significant pericardial effusion. Esophagus is within normal limits. No pulmonary nodule or mass. No consolidation, pleural effusion, or pneumothorax. Small calcific pleural plaques posteriorly on the left. No acute osseous abnormality. Visualized upper abdomen demonstrates no acute abnormality. No pulmonary embolism or acute intrathoracic process. Calcified pleural plaques on the left can be seen with asbestos related disease. All CT scans at this facility use dose modulation, iterative reconstruction, and/or weight based dosing when appropriate to reduce radiation dose to as low as reasonably achievable. XR CHEST PORTABLE    Result Date: 10/27/2021  EXAMINATION: XR CHEST PORTABLE CLINICAL HISTORY: ALTERED MENTAL STATUS COMPARISONS: SEPTEMBER 16, 2021 FINDINGS: Median sternotomy. Pacemaker generator and wires unchanged in position. Cardiopericardial silhouette normal. Aorta calcified. Pulmonary vasculature normal. Lungs clear. NO ACUTE CARDIOPULMONARY DISEASE.     Echo Limited with Bubble Study    Result Date: 10/28/2021  Transthoracic Echocardiography Report (TTE)  Demographics   Patient Name    Elle Mancera Gender               Male   Patient Number  [id number]       Race                                                   Ethnicity   Visit Number    [id number]      Room Number          W187   Corporate ID                   Date of Study        10/28/2021   Accession       [id number]     Referring Physician  Number   Date of Birth   03/05/1927     Sonographer          Srini Jones   Age             82 year(s)     Interpreting         [facility])                                 Physician            Cardiology Dia Hernandez  Procedure Type of Study   TTE procedure:ECHOCARDIOGRAM LIMITED WITH BUBBLE STUDY. Procedure Date Date: 10/28/2021 Start: 12:31 PM Study Location: Portable Technical Quality: Adequate visualization Indications:LVF. Patient Status: Routine Height: 72 inches BP: 136/53 mmHg Allergies   - No known allergies. Conclusions   Summary  TDS. Bubble study not obtainable  Moderately dilated left atrium. Left ventricular ejection fraction is visually estimated at 40%. Normal right ventricle structure and function. ?Pacer artifact   Signature   ----------------------------------------------------------------  Electronically signed by Roya Aparicio(Interpreting physician)  on 10/28/2021 05:25 PM  ----------------------------------------------------------------   Findings  Left Ventricle Left ventricular ejection fraction is visually estimated at 40%. Right Ventricle Normal right ventricle structure and function. ?Pacer artifact Left Atrium Moderately dilated left atrium. Right Atrium Normal right atrium. Mitral Valve Normal mitral valve structure and function. Tricuspid Valve Normal tricuspid valve structure and function. Aortic Valve Normal aortic valve structure and function. Pulmonic Valve Normal pulmonic valve structure and function. Pericardial Effusion No evidence of pericardial effusion. Pleural Effusion No evidence of pleural effusion. Aorta \ Miscellaneous Miscellaneous normal findings were found. M-Mode Measurements (cm)   LVIDd: 4.37 cm                         LVIDs: 3.43 cm  IVSd: 0.97 cm                          IVSs: 1.09 cm  LVPWd: 0.86 cm                         LVPWs: 1.05 cm  Rt. Vent.  Dimension: 2.46 cm           AO Root Dimension: 2.48 cm                                         ACS: 1.46 cm                                         LA: 3.04 cm                                         LVOT: 1.98 cm  Doppler Measurements:                                   MV Peak E-Wave: 0.44 m/s  TR Velocity:1.52 m/s            MV Peak A-Wave: 0.88 m/s  TR Gradient:9.26 mmHg                                   Estimated RAP:3 mmHg                                  RVSP:12.26 mmHg  Valves  Mitral Valve   Peak E-Wave: 0.44 m/s           Peak A-Wave: 0.88 m/s                                  E/A Ratio: 0.5                                  Peak Gradient: 0.77 mmHg                                  Deceleration Time: 337.7 msec   Aortic Valve   Cusp Separation: 1.46 cm   Tricuspid Valve   Estimated RVSP: 12.26 mmHg               Estimated RAP: 3 mmHg  TR Velocity: 1.52 m/s                    TR Gradient: 9.26 mmHg   Pulmonic Valve            Estimated PASP: 12.26 mmHg   LVOT   LVOT Diameter: 1.98 cm  Structures  Left Atrium   LA Dimension: 3.04 cm  LA/Aorta: 1.23   Left Ventricle   Diastolic Dimension: 4.66 cm         Systolic Dimension: 9.78 cm  Septum Diastolic: 9.27 cm            Septum Systolic: 5.69 cm  PW Diastolic: 7.53 cm                PW Systolic: 6.85 cm                                       FS: 21.5 %  LV EDV/LV EDV Index: 86.12 ml        LV ESV/LV ESV Index: 48.58 ml  EF Calculated: 43.6 %   LVOT Diameter: 1.98 cm   Right Atrium   RA Systolic Pressure: 3 mmHg   Right Ventricle   Diastolic Dimension: 1.78 cm                                    RV Systolic Pressure: 39.16 mmHg  Aorta/ Miscellaneous Aorta   Aortic Root: 2.48 cm  LVOT Diameter: 1.98 cm      Echo shows EF 40%, bubble study was not obtainable        IMPRESSION AND SUGGESTION:  Patient is at risk due to   · Hypoxia, most probably due to underlying systolic heart failure along with microatelectasis,  · No parenchymal or vascular lung disease  · Rule out obstructive airway disease  · History of DVT, unprovoked, on Eliquis  · Increased troponin, likely demand ischemia      Recommendation  · Continue pulmonary hygiene  · Home O2 eval prior to discharge  · Outpatient PFT  · Continue Eliquis  · Follow-up with pulmonary as outpatient    MELLISSA KEY - DVT ppx: sq heparin

## 2025-04-01 NOTE — PROGRESS NOTE ADULT - PROBLEM SELECTOR PLAN 3
Pt with normocytic anemia (Hgb trending downward slightly since admission from 11.1 to 9.4). Previous hospitalization December 2024 show Hgb around 9.5 (as it is currently). Iron studies from that admission show low iron at 26 with elevated TIBC of 124, normal ferritin of 161, likely consistent with anemia of chronic disease 2/2 to T2DM and associated lower extremity inflammation. However, important to rule out blood loss etiology, ie GI bleed.    - ask pt when was last colonoscopy; f/u outpt GI

## 2025-04-01 NOTE — PROGRESS NOTE ADULT - SUBJECTIVE AND OBJECTIVE BOX
Patient is a 55y old  Male who presents with a chief complaint of left foot gangrene (01 Apr 2025 07:16)       INTERVAL HPI/OVERNIGHT EVENTS:  Patient seen and evaluated at bedside.  Pt is resting comfortable in NAD. Denies N/V/F/C.    Allergies    No Known Allergies    Intolerances        Vital Signs Last 24 Hrs  T(C): 36.6 (01 Apr 2025 05:39), Max: 36.7 (31 Mar 2025 12:00)  T(F): 97.9 (01 Apr 2025 05:39), Max: 98.1 (31 Mar 2025 12:00)  HR: 70 (01 Apr 2025 05:39) (70 - 75)  BP: 131/77 (01 Apr 2025 05:39) (131/77 - 149/88)  BP(mean): --  RR: 18 (01 Apr 2025 05:39) (18 - 18)  SpO2: 97% (01 Apr 2025 05:39) (97% - 99%)    Parameters below as of 01 Apr 2025 05:39  Patient On (Oxygen Delivery Method): room air        LABS:                        9.5    6.81  )-----------( 351      ( 01 Apr 2025 06:49 )             29.8     04-01    137  |  103  |  24[H]  ----------------------------<  235[H]  4.0   |  20[L]  |  0.72    Ca    8.9      01 Apr 2025 06:49  Phos  3.2     04-01  Mg     1.90     04-01    TPro  6.6  /  Alb  3.0[L]  /  TBili  <0.2  /  DBili  x   /  AST  12  /  ALT  10  /  AlkPhos  102  04-01    PT/INR - ( 01 Apr 2025 06:49 )   PT: 10.7 sec;   INR: 0.90 ratio         PTT - ( 01 Apr 2025 06:49 )  PTT:32.5 sec  Urinalysis Basic - ( 01 Apr 2025 06:49 )    Color: x / Appearance: x / SG: x / pH: x  Gluc: 235 mg/dL / Ketone: x  / Bili: x / Urobili: x   Blood: x / Protein: x / Nitrite: x   Leuk Esterase: x / RBC: x / WBC x   Sq Epi: x / Non Sq Epi: x / Bacteria: x      CAPILLARY BLOOD GLUCOSE      POCT Blood Glucose.: 225 mg/dL (01 Apr 2025 08:19)  POCT Blood Glucose.: 210 mg/dL (01 Apr 2025 04:16)  POCT Blood Glucose.: 180 mg/dL (31 Mar 2025 21:15)  POCT Blood Glucose.: 179 mg/dL (31 Mar 2025 17:15)  POCT Blood Glucose.: 264 mg/dL (31 Mar 2025 12:06)      Lower Extremity Physical Exam:  Vascular: DP/PT 0/4, B/L, CFT <3 seconds B/L, Temperature gradient warm to cool, B/L.   Neuro: Epicritic sensation diminished to the level of digits, B/L.  Musculoskeletal/Ortho: unremarkable  Skin: left foot 2nd digit full thickness dry gangrene, no wet changes, no malodor, no fluctuance, right foot posteromedial ankle wound to subQ, no drainage, no fluctuance, no malodor    RADIOLOGY & ADDITIONAL TESTS:

## 2025-04-01 NOTE — PROGRESS NOTE ADULT - PROBLEM SELECTOR PLAN 1
L foot 2nd digit gangrene. L foot xray showed no gas, no osteomyelitis. Podiatry following; left foot partial 2nd ray resection with Dr. Osuna on Wed 4/2 at 2.   No signs of infection on L foot 2nd toe or R posteromedial ankle wound to subQ, no more leukocytosis, negative MRSA swab, no growth on blood cultures; therefore ok to d/c Abx (cleared with podiatry). Called podiatry to advance surgery date; they are unable to given it is non-emergent.    - d/c IV zosyn + vancomycin (cover for gram+ cocci gram- rods, anaerobes, MRSA)  - f/u INDY/PVR -> no occlusion/significant stenosis  - f/u blood cultures 3/31 -> NG  - f/u vascular consult -> no contraindication to surgery L foot 2nd digit gangrene. L foot xray showed no gas, no osteomyelitis. Podiatry following; left foot partial 2nd ray resection with Dr. Osuna on Wed 4/2 at 2.   No signs of infection on L foot 2nd toe or R posteromedial ankle wound to subQ, no more leukocytosis, negative MRSA swab, no growth on blood cultures; therefore ok to d/c Abx (cleared with podiatry). Called podiatry to advance surgery date; they are unable to given it is non-emergent.    - s/p IV zosyn + vancomycin (cover for gram+ cocci gram- rods, anaerobes, MRSA)  - f/u INDY/PVR -> no occlusion/significant stenosis  - f/u blood cultures 4/1 -> NG  - f/u vascular consult -> no contraindication to surgery

## 2025-04-01 NOTE — PROGRESS NOTE ADULT - SUBJECTIVE AND OBJECTIVE BOX
Overnight Events/Interval Hx: Pt seen and examined at bedside. No acute events overnight, no new complaints/concerns. Denies fever, chills. Tolerating po, ambulating.     OBJECTIVE:  ICU Vital Signs Last 24 Hrs  T(C): 36.6 (01 Apr 2025 05:39), Max: 36.7 (31 Mar 2025 12:00)  T(F): 97.9 (01 Apr 2025 05:39), Max: 98.1 (31 Mar 2025 12:00)  HR: 70 (01 Apr 2025 05:39) (70 - 75)  BP: 131/77 (01 Apr 2025 05:39) (131/77 - 149/88)  BP(mean): --  ABP: --  ABP(mean): --  RR: 18 (01 Apr 2025 05:39) (18 - 18)  SpO2: 97% (01 Apr 2025 05:39) (97% - 99%)    O2 Parameters below as of 01 Apr 2025 05:39  Patient On (Oxygen Delivery Method): room air          CAPILLARY BLOOD GLUCOSE      POCT Blood Glucose.: 225 mg/dL (01 Apr 2025 08:19)      PHYSICAL EXAM  GENERAL: NAD, well-developed  HEAD: Atraumatic, Normocephalic  EYES: 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 R eye conjunctiva and sclera clear  NECK: Supple, No JVD  CHEST/LUNG: Clear to auscultation bilaterally; No wheezes or crackles  HEART: Normal S1/S2; Regular rate and rhythm; No murmurs, rubs, or gallops  ABDOMEN: Soft, Nontender, Nondistended; Bowel sounds present  EXTREMITIES: R foot warm. L foot warm/cool. Both feet wrapped. L foot 2nd digit full thickness dry gangrene, no wet changes, no malodor, no fluctuance. R foot posteromedial ankle wound wrapped.   PSYCH: A&Ox3  NEUROLOGY: no focal neurologic deficit  SKIN: No rashes or lesions      HOSPITAL MEDICATIONS:  MEDICATIONS  (STANDING):  atorvastatin 20 milliGRAM(s) Oral at bedtime  chlorhexidine 2% Cloths 1 Application(s) Topical daily  dextrose 5%. 1000 milliLiter(s) (100 mL/Hr) IV Continuous <Continuous>  dextrose 5%. 1000 milliLiter(s) (50 mL/Hr) IV Continuous <Continuous>  dextrose 50% Injectable 25 Gram(s) IV Push once  dextrose 50% Injectable 12.5 Gram(s) IV Push once  dextrose 50% Injectable 25 Gram(s) IV Push once  dextrose Oral Gel 15 Gram(s) Oral once  glucagon  Injectable 1 milliGRAM(s) IntraMuscular once  insulin glargine Injectable (LANTUS) 25 Unit(s) SubCutaneous <User Schedule>  insulin lispro (ADMELOG) corrective regimen sliding scale   SubCutaneous three times a day before meals  insulin lispro (ADMELOG) corrective regimen sliding scale   SubCutaneous at bedtime  insulin lispro Injectable (ADMELOG) 8 Unit(s) SubCutaneous three times a day before meals  lactated ringers. 1000 milliLiter(s) (100 mL/Hr) IV Continuous <Continuous>  mupirocin 2% Ointment 1 Application(s) Topical two times a day    MEDICATIONS  (PRN):  acetaminophen     Tablet .. 650 milliGRAM(s) Oral every 6 hours PRN Temp greater or equal to 38C (100.4F), Mild Pain (1 - 3)  aluminum hydroxide/magnesium hydroxide/simethicone Suspension 30 milliLiter(s) Oral every 4 hours PRN Dyspepsia  melatonin 6 milliGRAM(s) Oral at bedtime PRN Insomnia      LABS:                        9.5    6.81  )-----------( 351      ( 01 Apr 2025 06:49 )             29.8     Hgb Trend: 9.5<--, 9.4<--, 9.5<--, 9.9<--, 9.6<--  04-01    137  |  103  |  24[H]  ----------------------------<  235[H]  4.0   |  20[L]  |  0.72    Ca    8.9      01 Apr 2025 06:49  Phos  3.2     04-01  Mg     1.90     04-01    TPro  6.6  /  Alb  3.0[L]  /  TBili  <0.2  /  DBili  x   /  AST  12  /  ALT  10  /  AlkPhos  102  04-01    Creatinine Trend: 0.72<--, 0.70<--, 0.67<--, 0.63<--, 0.71<--, 0.80<--  PT/INR - ( 01 Apr 2025 06:49 )   PT: 10.7 sec;   INR: 0.90 ratio         PTT - ( 01 Apr 2025 06:49 )  PTT:32.5 sec  Urinalysis Basic - ( 01 Apr 2025 06:49 )    Color: x / Appearance: x / SG: x / pH: x  Gluc: 235 mg/dL / Ketone: x  / Bili: x / Urobili: x   Blood: x / Protein: x / Nitrite: x   Leuk Esterase: x / RBC: x / WBC x   Sq Epi: x / Non Sq Epi: x / Bacteria: x            MICROBIOLOGY: none new      RADIOLOGY: none new

## 2025-04-01 NOTE — PROGRESS NOTE ADULT - PROBLEM SELECTOR PLAN 4
h/o gastric bypass for the sake of blood sugar control. Pt denies diarrhea, constipation, any GI sx. Pt with normocytic anemia (Hgb trending downward slightly since admission from 11.1 to 9.4). Previous hospitalization December 2024 show Hgb around 9.5 (as it is currently). Iron studies from that admission show low iron at 26 with elevated TIBC of 124, normal ferritin of 161, likely consistent with anemia of chronic disease 2/2 to T2DM and associated lower extremity inflammation. Last colonoscopy May 2024, negative; GI bleed less likely etiology.

## 2025-04-02 LAB
ANION GAP SERPL CALC-SCNC: 14 MMOL/L — SIGNIFICANT CHANGE UP (ref 7–14)
APTT BLD: 32 SEC — SIGNIFICANT CHANGE UP (ref 24.5–35.6)
BLD GP AB SCN SERPL QL: NEGATIVE — SIGNIFICANT CHANGE UP
BUN SERPL-MCNC: 29 MG/DL — HIGH (ref 7–23)
CALCIUM SERPL-MCNC: 9.2 MG/DL — SIGNIFICANT CHANGE UP (ref 8.4–10.5)
CHLORIDE SERPL-SCNC: 103 MMOL/L — SIGNIFICANT CHANGE UP (ref 98–107)
CO2 SERPL-SCNC: 21 MMOL/L — LOW (ref 22–31)
CREAT SERPL-MCNC: 0.76 MG/DL — SIGNIFICANT CHANGE UP (ref 0.5–1.3)
EGFR: 107 ML/MIN/1.73M2 — SIGNIFICANT CHANGE UP
EGFR: 107 ML/MIN/1.73M2 — SIGNIFICANT CHANGE UP
GLUCOSE BLDC GLUCOMTR-MCNC: 158 MG/DL — HIGH (ref 70–99)
GLUCOSE BLDC GLUCOMTR-MCNC: 186 MG/DL — HIGH (ref 70–99)
GLUCOSE BLDC GLUCOMTR-MCNC: 205 MG/DL — HIGH (ref 70–99)
GLUCOSE BLDC GLUCOMTR-MCNC: 213 MG/DL — HIGH (ref 70–99)
GLUCOSE BLDC GLUCOMTR-MCNC: 217 MG/DL — HIGH (ref 70–99)
GLUCOSE BLDC GLUCOMTR-MCNC: 219 MG/DL — HIGH (ref 70–99)
GLUCOSE BLDC GLUCOMTR-MCNC: 244 MG/DL — HIGH (ref 70–99)
GLUCOSE BLDC GLUCOMTR-MCNC: 260 MG/DL — HIGH (ref 70–99)
GLUCOSE SERPL-MCNC: 243 MG/DL — HIGH (ref 70–99)
HCT VFR BLD CALC: 31 % — LOW (ref 39–50)
HGB BLD-MCNC: 9.8 G/DL — LOW (ref 13–17)
INR BLD: 0.91 RATIO — SIGNIFICANT CHANGE UP (ref 0.85–1.16)
MAGNESIUM SERPL-MCNC: 1.9 MG/DL — SIGNIFICANT CHANGE UP (ref 1.6–2.6)
MCHC RBC-ENTMCNC: 27.4 PG — SIGNIFICANT CHANGE UP (ref 27–34)
MCHC RBC-ENTMCNC: 31.6 G/DL — LOW (ref 32–36)
MCV RBC AUTO: 86.6 FL — SIGNIFICANT CHANGE UP (ref 80–100)
NRBC # BLD AUTO: 0 K/UL — SIGNIFICANT CHANGE UP (ref 0–0)
NRBC # FLD: 0 K/UL — SIGNIFICANT CHANGE UP (ref 0–0)
NRBC BLD AUTO-RTO: 0 /100 WBCS — SIGNIFICANT CHANGE UP (ref 0–0)
PHOSPHATE SERPL-MCNC: 3.1 MG/DL — SIGNIFICANT CHANGE UP (ref 2.5–4.5)
PLATELET # BLD AUTO: 396 K/UL — SIGNIFICANT CHANGE UP (ref 150–400)
POTASSIUM SERPL-MCNC: 4.4 MMOL/L — SIGNIFICANT CHANGE UP (ref 3.5–5.3)
POTASSIUM SERPL-SCNC: 4.4 MMOL/L — SIGNIFICANT CHANGE UP (ref 3.5–5.3)
PROTHROM AB SERPL-ACNC: 10.8 SEC — SIGNIFICANT CHANGE UP (ref 9.9–13.4)
RBC # BLD: 3.58 M/UL — LOW (ref 4.2–5.8)
RBC # FLD: 12.6 % — SIGNIFICANT CHANGE UP (ref 10.3–14.5)
RH IG SCN BLD-IMP: POSITIVE — SIGNIFICANT CHANGE UP
SODIUM SERPL-SCNC: 138 MMOL/L — SIGNIFICANT CHANGE UP (ref 135–145)
WBC # BLD: 7.01 K/UL — SIGNIFICANT CHANGE UP (ref 3.8–10.5)
WBC # FLD AUTO: 7.01 K/UL — SIGNIFICANT CHANGE UP (ref 3.8–10.5)

## 2025-04-02 PROCEDURE — 99232 SBSQ HOSP IP/OBS MODERATE 35: CPT | Mod: GC

## 2025-04-02 PROCEDURE — 88305 TISSUE EXAM BY PATHOLOGIST: CPT | Mod: 26

## 2025-04-02 PROCEDURE — 88311 DECALCIFY TISSUE: CPT | Mod: 26

## 2025-04-02 RX ORDER — HYDROMORPHONE/SOD CHLOR,ISO/PF 2 MG/10 ML
0.5 SYRINGE (ML) INJECTION EVERY 4 HOURS
Refills: 0 | Status: DISCONTINUED | OUTPATIENT
Start: 2025-04-02 | End: 2025-04-03

## 2025-04-02 RX ORDER — ONDANSETRON HCL/PF 4 MG/2 ML
4 VIAL (ML) INJECTION ONCE
Refills: 0 | Status: DISCONTINUED | OUTPATIENT
Start: 2025-04-02 | End: 2025-04-02

## 2025-04-02 RX ORDER — MAGNESIUM SULFATE 500 MG/ML
1 SYRINGE (ML) INJECTION ONCE
Refills: 0 | Status: COMPLETED | OUTPATIENT
Start: 2025-04-02 | End: 2025-04-02

## 2025-04-02 RX ORDER — FENTANYL CITRATE-0.9 % NACL/PF 100MCG/2ML
25 SYRINGE (ML) INTRAVENOUS
Refills: 0 | Status: DISCONTINUED | OUTPATIENT
Start: 2025-04-02 | End: 2025-04-02

## 2025-04-02 RX ORDER — INSULIN GLARGINE-YFGN 100 [IU]/ML
27 INJECTION, SOLUTION SUBCUTANEOUS
Refills: 0 | Status: DISCONTINUED | OUTPATIENT
Start: 2025-04-03 | End: 2025-04-03

## 2025-04-02 RX ADMIN — INSULIN LISPRO 3: 100 INJECTION, SOLUTION INTRAVENOUS; SUBCUTANEOUS at 07:06

## 2025-04-02 RX ADMIN — INSULIN LISPRO 1: 100 INJECTION, SOLUTION INTRAVENOUS; SUBCUTANEOUS at 17:51

## 2025-04-02 RX ADMIN — INSULIN GLARGINE-YFGN 25 UNIT(S): 100 INJECTION, SOLUTION SUBCUTANEOUS at 07:02

## 2025-04-02 RX ADMIN — INSULIN LISPRO 9 UNIT(S): 100 INJECTION, SOLUTION INTRAVENOUS; SUBCUTANEOUS at 17:52

## 2025-04-02 RX ADMIN — INSULIN LISPRO 9 UNIT(S): 100 INJECTION, SOLUTION INTRAVENOUS; SUBCUTANEOUS at 12:18

## 2025-04-02 RX ADMIN — Medication 100 GRAM(S): at 17:54

## 2025-04-02 RX ADMIN — INSULIN LISPRO 2: 100 INJECTION, SOLUTION INTRAVENOUS; SUBCUTANEOUS at 10:55

## 2025-04-02 RX ADMIN — MUPIROCIN CALCIUM 1 APPLICATION(S): 20 CREAM TOPICAL at 17:54

## 2025-04-02 RX ADMIN — ATORVASTATIN CALCIUM 20 MILLIGRAM(S): 80 TABLET, FILM COATED ORAL at 22:57

## 2025-04-02 RX ADMIN — MUPIROCIN CALCIUM 1 APPLICATION(S): 20 CREAM TOPICAL at 05:18

## 2025-04-02 NOTE — PROGRESS NOTE ADULT - SUBJECTIVE AND OBJECTIVE BOX
Podiatry Pager #: [phone]    Patient is a 55y old  Male who presents with a chief complaint of left foot gangrene (02 Apr 2025 07:32)      INTERVAL HPI/OVERNIGHT EVENTS:   Pt is scheduled for left foot partial 2nd ray resection with Dr. Osuna at 11:30AM. Patient is aware of procedure and is NPO since midnight.    MEDICATIONS  (STANDING):  atorvastatin 20 milliGRAM(s) Oral at bedtime  chlorhexidine 2% Cloths 1 Application(s) Topical daily  dextrose 5%. 1000 milliLiter(s) (100 mL/Hr) IV Continuous <Continuous>  dextrose 5%. 1000 milliLiter(s) (50 mL/Hr) IV Continuous <Continuous>  dextrose 50% Injectable 25 Gram(s) IV Push once  dextrose 50% Injectable 12.5 Gram(s) IV Push once  dextrose 50% Injectable 25 Gram(s) IV Push once  dextrose Oral Gel 15 Gram(s) Oral once  glucagon  Injectable 1 milliGRAM(s) IntraMuscular once  insulin glargine Injectable (LANTUS) 25 Unit(s) SubCutaneous <User Schedule>  insulin lispro (ADMELOG) corrective regimen sliding scale   SubCutaneous at bedtime  insulin lispro (ADMELOG) corrective regimen sliding scale   SubCutaneous three times a day before meals  insulin lispro Injectable (ADMELOG) 9 Unit(s) SubCutaneous three times a day before meals  lactated ringers. 1000 milliLiter(s) (100 mL/Hr) IV Continuous <Continuous>  mupirocin 2% Ointment 1 Application(s) Topical two times a day    MEDICATIONS  (PRN):  acetaminophen     Tablet .. 650 milliGRAM(s) Oral every 6 hours PRN Temp greater or equal to 38C (100.4F), Mild Pain (1 - 3)  aluminum hydroxide/magnesium hydroxide/simethicone Suspension 30 milliLiter(s) Oral every 4 hours PRN Dyspepsia  melatonin 6 milliGRAM(s) Oral at bedtime PRN Insomnia      Allergies    No Known Allergies    Intolerances        Vital Signs Last 24 Hrs  T(C): 36.5 (02 Apr 2025 05:07), Max: 36.6 (01 Apr 2025 22:05)  T(F): 97.7 (02 Apr 2025 05:07), Max: 97.9 (01 Apr 2025 22:05)  HR: 72 (02 Apr 2025 05:07) (72 - 73)  BP: 149/92 (02 Apr 2025 05:07) (126/71 - 149/92)  BP(mean): --  RR: 18 (02 Apr 2025 05:07) (17 - 18)  SpO2: 98% (02 Apr 2025 05:07) (98% - 99%)    Parameters below as of 02 Apr 2025 05:07  Patient On (Oxygen Delivery Method): room air        LABS:                        9.8    7.01  )-----------( 396      ( 02 Apr 2025 06:20 )             31.0     04-01    137  |  103  |  24[H]  ----------------------------<  235[H]  4.0   |  20[L]  |  0.72    Ca    8.9      01 Apr 2025 06:49  Phos  3.2     04-01  Mg     1.90     04-01    TPro  6.6  /  Alb  3.0[L]  /  TBili  <0.2  /  DBili  x   /  AST  12  /  ALT  10  /  AlkPhos  102  04-01    PT/INR - ( 02 Apr 2025 06:20 )   PT: 10.8 sec;   INR: 0.91 ratio         PTT - ( 02 Apr 2025 06:20 )  PTT:32.0 sec  Urinalysis Basic - ( 01 Apr 2025 06:49 )    Color: x / Appearance: x / SG: x / pH: x  Gluc: 235 mg/dL / Ketone: x  / Bili: x / Urobili: x   Blood: x / Protein: x / Nitrite: x   Leuk Esterase: x / RBC: x / WBC x   Sq Epi: x / Non Sq Epi: x / Bacteria: x      CAPILLARY BLOOD GLUCOSE      POCT Blood Glucose.: 260 mg/dL (02 Apr 2025 07:00)  POCT Blood Glucose.: 217 mg/dL (02 Apr 2025 03:13)  POCT Blood Glucose.: 190 mg/dL (01 Apr 2025 22:13)  POCT Blood Glucose.: 189 mg/dL (01 Apr 2025 17:08)  POCT Blood Glucose.: 199 mg/dL (01 Apr 2025 12:12)  POCT Blood Glucose.: 225 mg/dL (01 Apr 2025 08:19)      RADIOLOGY & ADDITIONAL TESTS:    Plan:   To OR today at 11:30AM with Dr. Osuna for left foot partial 2nd ray resection.   CXR on sunrise.  EKG on sunrise.  Medical clearance since 4/1 and documented in chart.  Consent signed and in chart.  Procedure was explained to patient in detail. All alternatives, risks and complications were discussed. All questions answered.

## 2025-04-02 NOTE — PROGRESS NOTE ADULT - PROBLEM SELECTOR PLAN 5
h/o gastric bypass for the sake of blood sugar control. Pt denies diarrhea, constipation, any GI sx. - DVT ppx: sq heparin  Dispo: Pending Vascular and Podiatry recs

## 2025-04-02 NOTE — PROGRESS NOTE ADULT - PROBLEM SELECTOR PLAN 4
Pt with normocytic anemia (Hgb trending downward slightly since admission from 11.1 to 9.4). Previous hospitalization December 2024 show Hgb around 9.5 (as it is currently). Iron studies from that admission show low iron at 26 with elevated TIBC of 124, normal ferritin of 161, likely consistent with anemia of chronic disease 2/2 to T2DM and associated lower extremity inflammation. Last colonoscopy May 2024, negative; GI bleed less likely etiology. h/o gastric bypass for the sake of blood sugar control. Pt denies diarrhea, constipation, any GI sx.

## 2025-04-02 NOTE — PRE-OP CHECKLIST - 2.
Skin- L foot and R foot dressing in place for wounds, b/l LE scabs and scars Skin- L foot (2nd digit) and R foot (inner ankle) dressing in place for wounds, b/l LE scabs and scars

## 2025-04-02 NOTE — BRIEF OPERATIVE NOTE - OPERATION/FINDINGS
s/p L foot Partial Second Ray Resection, closed  - Inadequate intraop bleeding   - bone at proximal resection was hard and of good quality  - No purulence noted  - Incision was primarily closed using 3-0 Nylon

## 2025-04-02 NOTE — PROGRESS NOTE ADULT - PROBLEM SELECTOR PLAN 3
RCRI: 0   3.9% Risk of Major Cardiac Event  Fitzpatrick: 0.1% risk of Myocardial infarction or cardiac arrest, intraoperatively or up to 30 days post op    Patient is medically optimized for procedure on 4/2/25 Pt with normocytic anemia (Hgb trending downward slightly since admission from 11.1 to 9.4). Previous hospitalization December 2024 show Hgb around 9.5 (as it is currently). Iron studies from that admission show low iron at 26 with elevated TIBC of 124, normal ferritin of 161, likely consistent with anemia of chronic disease 2/2 to T2DM and associated lower extremity inflammation. Last colonoscopy May 2024, negative; GI bleed less likely etiology.

## 2025-04-02 NOTE — BRIEF OPERATIVE NOTE - COMMENTS
s/p L foot Partial Second Ray Resection, closed  - Low concern for residual infection  - High concern for viability  - No further podiatric intervention necessary, stable for discharge from podiatry standpoint, pending final vasc recs

## 2025-04-02 NOTE — PROGRESS NOTE ADULT - ASSESSMENT
54 y/o M with PMH of DM2, sent in by podiatrist Dr. 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 Admitted for management of L foot dry gangrene / evaluation for possible amputation, found to have mild DKA resolved s/p ademlog 10u and initiation of inpatient insulin regimen, s/p vanc and zosyn. Awaiting left foot second partial ray resection with Dr. Osuna on Wed 4/2 at 2.     54 y/o M with PMH of DM2, sent in by podiatrist Dr. 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 Admitted for management of L foot dry gangrene / evaluation for possible amputation, found to have mild DKA resolved s/p ademlog 10u and initiation of inpatient insulin regimen, s/p vanc and zosyn. s/p left foot second ray resection w/podiatry. Pending vascular recs.

## 2025-04-02 NOTE — PROGRESS NOTE ADULT - PROBLEM SELECTOR PLAN 1
L foot 2nd digit gangrene. L foot xray showed no gas, no osteomyelitis. Podiatry following; left foot partial 2nd ray resection with Dr. Osuna on Wed 4/2 at 2.   No signs of infection on L foot 2nd toe or R posteromedial ankle wound to subQ, no more leukocytosis, negative MRSA swab, no growth on blood cultures; therefore ok to d/c Abx (cleared with podiatry). Called podiatry to advance surgery date; they are unable to given it is non-emergent.    - s/p IV zosyn + vancomycin (cover for gram+ cocci gram- rods, anaerobes, MRSA)  - f/u INDY/PVR -> no occlusion/significant stenosis  - f/u blood cultures 4/1 -> NG  - f/u vascular consult -> no contraindication to surgery L foot 2nd digit gangrene. L foot xray showed no gas, no osteomyelitis. Podiatry following; left foot partial 2nd ray resection with Dr. Osuna on Wed 4/2 at 2.   No signs of infection on L foot 2nd toe or R posteromedial ankle wound to subQ, no more leukocytosis, negative MRSA swab, no growth on blood cultures; therefore ok to d/c Abx (cleared with podiatry). Called podiatry to advance surgery date; they are unable to given it is non-emergent.    - s/p L 2nd ray resection w/Podiatry. Pending vascular recs  - s/p IV zosyn + vancomycin (cover for gram+ cocci gram- rods, anaerobes, MRSA)  - f/u INDY/PVR -> no occlusion/significant stenosis  - f/u blood cultures 4/1 -> NG  - f/u vascular consult -> no contraindication to surgery

## 2025-04-02 NOTE — PRE-OP CHECKLIST - 3.
pt has hyperpigmentation and darken areas to b/l legs pt has hyperpigmentation and darken areas to b/l legs, top of left foot pink area

## 2025-04-02 NOTE — PROGRESS NOTE ADULT - SUBJECTIVE AND OBJECTIVE BOX
Patient is a 55y old  Male who presents with a chief complaint of left foot gangrene (01 Apr 2025 13:35)      SUBJECTIVE / OVERNIGHT EVENTS:    MEDICATIONS  (STANDING):  atorvastatin 20 milliGRAM(s) Oral at bedtime  chlorhexidine 2% Cloths 1 Application(s) Topical daily  dextrose 5%. 1000 milliLiter(s) (100 mL/Hr) IV Continuous <Continuous>  dextrose 5%. 1000 milliLiter(s) (50 mL/Hr) IV Continuous <Continuous>  dextrose 50% Injectable 25 Gram(s) IV Push once  dextrose 50% Injectable 12.5 Gram(s) IV Push once  dextrose 50% Injectable 25 Gram(s) IV Push once  dextrose Oral Gel 15 Gram(s) Oral once  glucagon  Injectable 1 milliGRAM(s) IntraMuscular once  insulin glargine Injectable (LANTUS) 25 Unit(s) SubCutaneous <User Schedule>  insulin lispro (ADMELOG) corrective regimen sliding scale   SubCutaneous at bedtime  insulin lispro (ADMELOG) corrective regimen sliding scale   SubCutaneous three times a day before meals  insulin lispro Injectable (ADMELOG) 9 Unit(s) SubCutaneous three times a day before meals  lactated ringers. 1000 milliLiter(s) (100 mL/Hr) IV Continuous <Continuous>  mupirocin 2% Ointment 1 Application(s) Topical two times a day    MEDICATIONS  (PRN):  acetaminophen     Tablet .. 650 milliGRAM(s) Oral every 6 hours PRN Temp greater or equal to 38C (100.4F), Mild Pain (1 - 3)  aluminum hydroxide/magnesium hydroxide/simethicone Suspension 30 milliLiter(s) Oral every 4 hours PRN Dyspepsia  melatonin 6 milliGRAM(s) Oral at bedtime PRN Insomnia      CAPILLARY BLOOD GLUCOSE      POCT Blood Glucose.: 260 mg/dL (02 Apr 2025 07:00)  POCT Blood Glucose.: 217 mg/dL (02 Apr 2025 03:13)  POCT Blood Glucose.: 190 mg/dL (01 Apr 2025 22:13)  POCT Blood Glucose.: 189 mg/dL (01 Apr 2025 17:08)  POCT Blood Glucose.: 199 mg/dL (01 Apr 2025 12:12)  POCT Blood Glucose.: 225 mg/dL (01 Apr 2025 08:19)    I&O's Summary      Vital Signs Last 24 Hrs  T(C): 36.5 (02 Apr 2025 05:07), Max: 36.6 (01 Apr 2025 22:05)  T(F): 97.7 (02 Apr 2025 05:07), Max: 97.9 (01 Apr 2025 22:05)  HR: 72 (02 Apr 2025 05:07) (72 - 73)  BP: 149/92 (02 Apr 2025 05:07) (126/71 - 149/92)  BP(mean): --  RR: 18 (02 Apr 2025 05:07) (17 - 18)  SpO2: 98% (02 Apr 2025 05:07) (98% - 99%)    Parameters below as of 02 Apr 2025 05:07  Patient On (Oxygen Delivery Method): room air        PHYSICAL EXAM:  GENERAL: NAD, well-developed  HEAD: Atraumatic, Normocephalic  EYES: EOMI, PERRLA, conjunctiva and sclera clear  NECK: Supple, No JVD  CHEST/LUNG: Clear to auscultation bilaterally; No wheezes or crackles  HEART: Normal S1/S2; Regular rate and rhythm; No murmurs, rubs, or gallops  ABDOMEN: Soft, Nontender, Nondistended; Bowel sounds present  EXTREMITIES: 2+ Peripheral Pulses; No clubbing, cyanosis, or edema  PSYCH: A&Ox3  NEUROLOGY: no focal neurologic deficit  SKIN: No rashes or lesions    LABS:                        9.8    7.01  )-----------( 396      ( 02 Apr 2025 06:20 )             31.0      04-01    137  |  103  |  24[H]  ----------------------------<  235[H]  4.0   |  20[L]  |  0.72    Ca    8.9      01 Apr 2025 06:49  Phos  3.2     04-01  Mg     1.90     04-01    TPro  6.6  /  Alb  3.0[L]  /  TBili  <0.2  /  DBili  x   /  AST  12  /  ALT  10  /  AlkPhos  102  04-01    PT/INR - ( 02 Apr 2025 06:20 )   PT: 10.8 sec;   INR: 0.91 ratio         PTT - ( 02 Apr 2025 06:20 )  PTT:32.0 sec      Urinalysis Basic - ( 01 Apr 2025 06:49 )    Color: x / Appearance: x / SG: x / pH: x  Gluc: 235 mg/dL / Ketone: x  / Bili: x / Urobili: x   Blood: x / Protein: x / Nitrite: x   Leuk Esterase: x / RBC: x / WBC x   Sq Epi: x / Non Sq Epi: x / Bacteria: x        RADIOLOGY & ADDITIONAL TESTS:    Imaging Personally Reviewed:    Consultant(s) Notes Reviewed:      Care Discussed with Consultants/Other Providers:   Patient is a 55y old  Male who presents with a chief complaint of left foot gangrene (01 Apr 2025 13:35)      SUBJECTIVE / OVERNIGHT EVENTS:  No overnight events    Patient going for procedure w/Podiatry today     MEDICATIONS  (STANDING):  atorvastatin 20 milliGRAM(s) Oral at bedtime  chlorhexidine 2% Cloths 1 Application(s) Topical daily  dextrose 5%. 1000 milliLiter(s) (100 mL/Hr) IV Continuous <Continuous>  dextrose 5%. 1000 milliLiter(s) (50 mL/Hr) IV Continuous <Continuous>  dextrose 50% Injectable 25 Gram(s) IV Push once  dextrose 50% Injectable 12.5 Gram(s) IV Push once  dextrose 50% Injectable 25 Gram(s) IV Push once  dextrose Oral Gel 15 Gram(s) Oral once  glucagon  Injectable 1 milliGRAM(s) IntraMuscular once  insulin glargine Injectable (LANTUS) 25 Unit(s) SubCutaneous <User Schedule>  insulin lispro (ADMELOG) corrective regimen sliding scale   SubCutaneous at bedtime  insulin lispro (ADMELOG) corrective regimen sliding scale   SubCutaneous three times a day before meals  insulin lispro Injectable (ADMELOG) 9 Unit(s) SubCutaneous three times a day before meals  lactated ringers. 1000 milliLiter(s) (100 mL/Hr) IV Continuous <Continuous>  mupirocin 2% Ointment 1 Application(s) Topical two times a day    MEDICATIONS  (PRN):  acetaminophen     Tablet .. 650 milliGRAM(s) Oral every 6 hours PRN Temp greater or equal to 38C (100.4F), Mild Pain (1 - 3)  aluminum hydroxide/magnesium hydroxide/simethicone Suspension 30 milliLiter(s) Oral every 4 hours PRN Dyspepsia  melatonin 6 milliGRAM(s) Oral at bedtime PRN Insomnia      CAPILLARY BLOOD GLUCOSE      POCT Blood Glucose.: 260 mg/dL (02 Apr 2025 07:00)  POCT Blood Glucose.: 217 mg/dL (02 Apr 2025 03:13)  POCT Blood Glucose.: 190 mg/dL (01 Apr 2025 22:13)  POCT Blood Glucose.: 189 mg/dL (01 Apr 2025 17:08)  POCT Blood Glucose.: 199 mg/dL (01 Apr 2025 12:12)  POCT Blood Glucose.: 225 mg/dL (01 Apr 2025 08:19)    I&O's Summary      Vital Signs Last 24 Hrs  T(C): 36.5 (02 Apr 2025 05:07), Max: 36.6 (01 Apr 2025 22:05)  T(F): 97.7 (02 Apr 2025 05:07), Max: 97.9 (01 Apr 2025 22:05)  HR: 72 (02 Apr 2025 05:07) (72 - 73)  BP: 149/92 (02 Apr 2025 05:07) (126/71 - 149/92)  BP(mean): --  RR: 18 (02 Apr 2025 05:07) (17 - 18)  SpO2: 98% (02 Apr 2025 05:07) (98% - 99%)    Parameters below as of 02 Apr 2025 05:07  Patient On (Oxygen Delivery Method): room air        PHYSICAL EXAM:  GENERAL: NAD, well-developed  HEAD: Atraumatic, Normocephalic  EYES: EOMI, PERRLA, conjunctiva and sclera clear  NECK: Supple, No JVD  CHEST/LUNG: Clear to auscultation bilaterally; No wheezes or crackles  HEART: Normal S1/S2; Regular rate and rhythm; No murmurs, rubs, or gallops  ABDOMEN: Soft, Nontender, Nondistended; Bowel sounds present  EXTREMITIES: Gangrene of L 2nd toe   PSYCH: A&Ox3  NEUROLOGY: no focal neurologic deficit  SKIN: No rashes or lesions    LABS:                        9.8    7.01  )-----------( 396      ( 02 Apr 2025 06:20 )             31.0      04-01    137  |  103  |  24[H]  ----------------------------<  235[H]  4.0   |  20[L]  |  0.72    Ca    8.9      01 Apr 2025 06:49  Phos  3.2     04-01  Mg     1.90     04-01    TPro  6.6  /  Alb  3.0[L]  /  TBili  <0.2  /  DBili  x   /  AST  12  /  ALT  10  /  AlkPhos  102  04-01    PT/INR - ( 02 Apr 2025 06:20 )   PT: 10.8 sec;   INR: 0.91 ratio         PTT - ( 02 Apr 2025 06:20 )  PTT:32.0 sec      Urinalysis Basic - ( 01 Apr 2025 06:49 )    Color: x / Appearance: x / SG: x / pH: x  Gluc: 235 mg/dL / Ketone: x  / Bili: x / Urobili: x   Blood: x / Protein: x / Nitrite: x   Leuk Esterase: x / RBC: x / WBC x   Sq Epi: x / Non Sq Epi: x / Bacteria: x        RADIOLOGY & ADDITIONAL TESTS:    Imaging Personally Reviewed:    Consultant(s) Notes Reviewed:      Care Discussed with Consultants/Other Providers:

## 2025-04-02 NOTE — CHART NOTE - NSCHARTNOTEFT_GEN_A_CORE
Endocrine follow up   Chart reviewed  see last progress note for full plan of care    Interval hx: when rounding pt was off unit in OR. Pt was NPO after MN for OR today. FS this morning was above goal and this afternoon was 186.     CAPILLARY BLOOD GLUCOSE      POCT Blood Glucose.: 186 mg/dL (02 Apr 2025 11:35)  POCT Blood Glucose.: 219 mg/dL (02 Apr 2025 10:40)  POCT Blood Glucose.: 205 mg/dL (02 Apr 2025 09:12)  POCT Blood Glucose.: 244 mg/dL (02 Apr 2025 08:20)  POCT Blood Glucose.: 260 mg/dL (02 Apr 2025 07:00)  POCT Blood Glucose.: 217 mg/dL (02 Apr 2025 03:13)  POCT Blood Glucose.: 190 mg/dL (01 Apr 2025 22:13)  POCT Blood Glucose.: 189 mg/dL (01 Apr 2025 17:08)                          9.8    7.01  )-----------( 396      ( 02 Apr 2025 06:20 )             31.0     04-02    138  |  103  |  29[H]  ----------------------------<  243[H]  4.4   |  21[L]  |  0.76    Ca    9.2      02 Apr 2025 06:20  Phos  3.1     04-02  Mg     1.90     04-02    TPro  6.6  /  Alb  3.0[L]  /  TBili  <0.2  /  DBili  x   /  AST  12  /  ALT  10  /  AlkPhos  102  04-01    Vital Signs Last 24 Hrs  T(C): 36.6 (02 Apr 2025 11:30), Max: 36.9 (02 Apr 2025 08:50)  T(F): 97.8 (02 Apr 2025 11:30), Max: 98.4 (02 Apr 2025 08:50)  HR: 65 (02 Apr 2025 11:30) (64 - 76)  BP: 141/84 (02 Apr 2025 11:15) (123/76 - 155/91)  BP(mean): 101 (02 Apr 2025 11:15) (89 - 101)  RR: 12 (02 Apr 2025 11:30) (12 - 18)  SpO2: 97% (02 Apr 2025 11:30) (93% - 99%)    Parameters below as of 02 Apr 2025 10:40  Patient On (Oxygen Delivery Method): room air          MEDICATIONS  (STANDING):  atorvastatin 20 milliGRAM(s) Oral at bedtime  chlorhexidine 2% Cloths 1 Application(s) Topical daily  dextrose 5%. 1000 milliLiter(s) (100 mL/Hr) IV Continuous <Continuous>  dextrose 5%. 1000 milliLiter(s) (50 mL/Hr) IV Continuous <Continuous>  dextrose 50% Injectable 25 Gram(s) IV Push once  dextrose 50% Injectable 12.5 Gram(s) IV Push once  dextrose 50% Injectable 25 Gram(s) IV Push once  dextrose Oral Gel 15 Gram(s) Oral once  glucagon  Injectable 1 milliGRAM(s) IntraMuscular once  insulin lispro (ADMELOG) corrective regimen sliding scale   SubCutaneous at bedtime  insulin lispro (ADMELOG) corrective regimen sliding scale   SubCutaneous three times a day before meals  insulin lispro Injectable (ADMELOG) 9 Unit(s) SubCutaneous three times a day before meals  lactated ringers. 1000 milliLiter(s) (100 mL/Hr) IV Continuous <Continuous>  magnesium sulfate  IVPB 1 Gram(s) IV Intermittent once  mupirocin 2% Ointment 1 Application(s) Topical two times a day    Diet, Consistent Carbohydrate/No Snacks (03-27-25 @ 15:44) [Active]    55y Male with PMH of DM who presented to the hospital with LE infection.  Endocrinology consulted for hyperglycemia    #Uncontrolled Type 2 Diabetes Mellitus with hyperglycemia and   #DKA, mild (resolving)  - Follows with: Dr. Hernandez  - A1C with Estimated Average Glucose Result: 16.7 % (03-26-25)  - home regimen: Lantus 12 units (3/29: pt states he want non-adherent with Lantus as he was concerned about the side effect of insulin. Pt educated on importance of skipping medication and DM complications), not compliant with Admelog moderate dose sliding scale; has dexcom G7 at home   - eGFR: 113 mL/min/1.73m2 (03-27-25)  - Weight (kg): 64 (03-27-25)  - glucose elevated, mild DKA on presentation showing improvement      INPATIENT PLAN:  - Inpatient BG goal 100-180mg/dl: FS above goal this morning and at gola this afternooon, overall improvement. Pt was NPO after MN for OR today.   - Will Increase Lantus dose to 27 units daily at 8am since no longer NPO.   - Continue Admelog 9 units TID before meals (HOLD if NPO or not eating)  - Continue low Admelog correctional scale TID AC  - Continue separate low Admelog correctional scale at HS  - Please check FSG before meals and QHS, or q6h while NPO; check 3 am FS   - consistent carb diet when eating  - nutrition consult placed  - Hypoglycemia Protocol   - Educated patient to please wait for FS prior to eating and to avoid eating between meals.  - BHB normal today (0.4 on 3/29 @ 12:13). No need to trend BHB unless bicard or pH is abnormal.     TAVON Davila-BC  Nurse Practitioner  Division of Endocrinology & Diabetes  Contact on Teams

## 2025-04-02 NOTE — PROGRESS NOTE ADULT - PROBLEM SELECTOR PLAN 2
Initially elevated glucoses and DKA, BHB NOW RESOLVED; persistent BHB elevation, received 2 liters LR on 3/28 with resolution of BHB    - IVF as needed for BHB, pt tolerating PO  - 3/30 additional 500cc (100/hr x5h)  - Endocrine following -> lantus 25, premeal 8 and ISS

## 2025-04-02 NOTE — PRE-OP CHECKLIST - SELECT TESTS ORDERED
BMP/CBC/PT/PTT/Type and Screen/POCT Blood Glucose fs at 9:05- 205/BMP/CBC/PT/PTT/Type and Screen/POCT Blood Glucose fs at 9:12- 205/BMP/CBC/PT/PTT/Type and Screen/POCT Blood Glucose

## 2025-04-02 NOTE — BRIEF OPERATIVE NOTE - NSICDXBRIEFPROCEDURE_GEN_ALL_CORE_FT
PROCEDURES:  Partial amputation of second ray of left foot by open approach 02-Apr-2025 10:50:41  Derrick Carter

## 2025-04-03 ENCOUNTER — TRANSCRIPTION ENCOUNTER (OUTPATIENT)
Age: 55
End: 2025-04-03

## 2025-04-03 VITALS
RESPIRATION RATE: 18 BRPM | OXYGEN SATURATION: 97 % | DIASTOLIC BLOOD PRESSURE: 83 MMHG | HEART RATE: 71 BPM | TEMPERATURE: 98 F | SYSTOLIC BLOOD PRESSURE: 136 MMHG

## 2025-04-03 PROBLEM — E11.65 TYPE 2 DIABETES MELLITUS WITH HYPERGLYCEMIA: Chronic | Status: ACTIVE | Noted: 2025-03-27

## 2025-04-03 LAB
ADD ON TEST-SPECIMEN IN LAB: SIGNIFICANT CHANGE UP
ANION GAP SERPL CALC-SCNC: 15 MMOL/L — HIGH (ref 7–14)
B-OH-BUTYR SERPL-SCNC: 1.1 MMOL/L — HIGH (ref 0–0.4)
BLOOD GAS VENOUS COMPREHENSIVE RESULT: SIGNIFICANT CHANGE UP
BUN SERPL-MCNC: 33 MG/DL — HIGH (ref 7–23)
CALCIUM SERPL-MCNC: 9.2 MG/DL — SIGNIFICANT CHANGE UP (ref 8.4–10.5)
CHLORIDE SERPL-SCNC: 102 MMOL/L — SIGNIFICANT CHANGE UP (ref 98–107)
CO2 SERPL-SCNC: 18 MMOL/L — LOW (ref 22–31)
CREAT SERPL-MCNC: 0.85 MG/DL — SIGNIFICANT CHANGE UP (ref 0.5–1.3)
EGFR: 104 ML/MIN/1.73M2 — SIGNIFICANT CHANGE UP
EGFR: 104 ML/MIN/1.73M2 — SIGNIFICANT CHANGE UP
GLUCOSE BLDC GLUCOMTR-MCNC: 123 MG/DL — HIGH (ref 70–99)
GLUCOSE BLDC GLUCOMTR-MCNC: 183 MG/DL — HIGH (ref 70–99)
GLUCOSE BLDC GLUCOMTR-MCNC: 215 MG/DL — HIGH (ref 70–99)
GLUCOSE BLDC GLUCOMTR-MCNC: 259 MG/DL — HIGH (ref 70–99)
GLUCOSE SERPL-MCNC: 232 MG/DL — HIGH (ref 70–99)
HCT VFR BLD CALC: 29.6 % — LOW (ref 39–50)
HGB BLD-MCNC: 9.5 G/DL — LOW (ref 13–17)
MAGNESIUM SERPL-MCNC: 1.9 MG/DL — SIGNIFICANT CHANGE UP (ref 1.6–2.6)
MCHC RBC-ENTMCNC: 27.5 PG — SIGNIFICANT CHANGE UP (ref 27–34)
MCHC RBC-ENTMCNC: 32.1 G/DL — SIGNIFICANT CHANGE UP (ref 32–36)
MCV RBC AUTO: 85.8 FL — SIGNIFICANT CHANGE UP (ref 80–100)
NRBC # BLD AUTO: 0 K/UL — SIGNIFICANT CHANGE UP (ref 0–0)
NRBC # FLD: 0 K/UL — SIGNIFICANT CHANGE UP (ref 0–0)
NRBC BLD AUTO-RTO: 0 /100 WBCS — SIGNIFICANT CHANGE UP (ref 0–0)
PHOSPHATE SERPL-MCNC: 3.1 MG/DL — SIGNIFICANT CHANGE UP (ref 2.5–4.5)
PLATELET # BLD AUTO: 375 K/UL — SIGNIFICANT CHANGE UP (ref 150–400)
POTASSIUM SERPL-MCNC: 4.1 MMOL/L — SIGNIFICANT CHANGE UP (ref 3.5–5.3)
POTASSIUM SERPL-SCNC: 4.1 MMOL/L — SIGNIFICANT CHANGE UP (ref 3.5–5.3)
RBC # BLD: 3.45 M/UL — LOW (ref 4.2–5.8)
RBC # FLD: 12.7 % — SIGNIFICANT CHANGE UP (ref 10.3–14.5)
SODIUM SERPL-SCNC: 135 MMOL/L — SIGNIFICANT CHANGE UP (ref 135–145)
WBC # BLD: 10.9 K/UL — HIGH (ref 3.8–10.5)
WBC # FLD AUTO: 10.9 K/UL — HIGH (ref 3.8–10.5)

## 2025-04-03 PROCEDURE — 99239 HOSP IP/OBS DSCHRG MGMT >30: CPT | Mod: GC

## 2025-04-03 PROCEDURE — 99232 SBSQ HOSP IP/OBS MODERATE 35: CPT

## 2025-04-03 RX ORDER — MAGNESIUM SULFATE 500 MG/ML
1 SYRINGE (ML) INJECTION ONCE
Refills: 0 | Status: COMPLETED | OUTPATIENT
Start: 2025-04-03 | End: 2025-04-03

## 2025-04-03 RX ORDER — INSULIN GLARGINE-YFGN 100 [IU]/ML
30 INJECTION, SOLUTION SUBCUTANEOUS
Refills: 0 | Status: DISCONTINUED | OUTPATIENT
Start: 2025-04-04 | End: 2025-04-03

## 2025-04-03 RX ORDER — INSULIN LISPRO 100 U/ML
10 INJECTION, SOLUTION INTRAVENOUS; SUBCUTANEOUS
Qty: 3 | Refills: 2
Start: 2025-04-03 | End: 2025-07-01

## 2025-04-03 RX ORDER — INSULIN GLARGINE-YFGN 100 [IU]/ML
30 INJECTION, SOLUTION SUBCUTANEOUS
Qty: 2 | Refills: 3
Start: 2025-04-03 | End: 2025-07-31

## 2025-04-03 RX ORDER — INSULIN LISPRO 100 U/ML
10 INJECTION, SOLUTION INTRAVENOUS; SUBCUTANEOUS
Refills: 0 | Status: DISCONTINUED | OUTPATIENT
Start: 2025-04-03 | End: 2025-04-03

## 2025-04-03 RX ORDER — SODIUM CHLORIDE 9 G/1000ML
1000 INJECTION, SOLUTION INTRAVENOUS
Refills: 0 | Status: DISCONTINUED | OUTPATIENT
Start: 2025-04-03 | End: 2025-04-03

## 2025-04-03 RX ADMIN — INSULIN LISPRO 1: 100 INJECTION, SOLUTION INTRAVENOUS; SUBCUTANEOUS at 12:15

## 2025-04-03 RX ADMIN — MUPIROCIN CALCIUM 1 APPLICATION(S): 20 CREAM TOPICAL at 05:33

## 2025-04-03 RX ADMIN — INSULIN LISPRO 9 UNIT(S): 100 INJECTION, SOLUTION INTRAVENOUS; SUBCUTANEOUS at 09:03

## 2025-04-03 RX ADMIN — Medication 100 GRAM(S): at 09:27

## 2025-04-03 RX ADMIN — SODIUM CHLORIDE 75 MILLILITER(S): 9 INJECTION, SOLUTION INTRAVENOUS at 14:00

## 2025-04-03 RX ADMIN — MUPIROCIN CALCIUM 1 APPLICATION(S): 20 CREAM TOPICAL at 17:14

## 2025-04-03 RX ADMIN — INSULIN LISPRO 10 UNIT(S): 100 INJECTION, SOLUTION INTRAVENOUS; SUBCUTANEOUS at 17:12

## 2025-04-03 RX ADMIN — INSULIN LISPRO 3: 100 INJECTION, SOLUTION INTRAVENOUS; SUBCUTANEOUS at 09:03

## 2025-04-03 RX ADMIN — Medication 1 APPLICATION(S): at 17:16

## 2025-04-03 RX ADMIN — INSULIN GLARGINE-YFGN 27 UNIT(S): 100 INJECTION, SOLUTION SUBCUTANEOUS at 09:02

## 2025-04-03 RX ADMIN — INSULIN LISPRO 9 UNIT(S): 100 INJECTION, SOLUTION INTRAVENOUS; SUBCUTANEOUS at 12:15

## 2025-04-03 NOTE — PROGRESS NOTE ADULT - SUBJECTIVE AND OBJECTIVE BOX
Vascular Surgery Progress Note     Subjective:  Patient seen and examined.       Vital Signs:  Vital Signs Last 24 Hrs  T(C): 36.8 (03 Apr 2025 05:06), Max: 36.8 (03 Apr 2025 05:06)  T(F): 98.2 (03 Apr 2025 05:06), Max: 98.2 (03 Apr 2025 05:06)  HR: 73 (03 Apr 2025 05:06) (64 - 73)  BP: 158/79 (03 Apr 2025 05:06) (123/76 - 158/79)  BP(mean): 101 (02 Apr 2025 11:15) (89 - 101)  RR: 17 (03 Apr 2025 05:06) (12 - 18)  SpO2: 99% (03 Apr 2025 05:06) (93% - 99%)    Parameters below as of 03 Apr 2025 05:06  Patient On (Oxygen Delivery Method): room air        CAPILLARY BLOOD GLUCOSE      POCT Blood Glucose.: 259 mg/dL (03 Apr 2025 08:43)  POCT Blood Glucose.: 215 mg/dL (03 Apr 2025 02:53)  POCT Blood Glucose.: 213 mg/dL (02 Apr 2025 21:29)  POCT Blood Glucose.: 158 mg/dL (02 Apr 2025 17:13)  POCT Blood Glucose.: 186 mg/dL (02 Apr 2025 11:35)  POCT Blood Glucose.: 219 mg/dL (02 Apr 2025 10:40)      I&O's Detail        Physical Exam:  General: NAD, resting comfortably  Pulmonary: nonlabored respirations  Abdominal: soft, ND/NT  Extremities: LE dressing c/d/i          Labs:    04-03    135  |  102  |  33[H]  ----------------------------<  232[H]  4.1   |  18[L]  |  0.85    Ca    9.2      03 Apr 2025 05:30  Phos  3.1     04-03  Mg     1.90     04-03                              9.5    10.90 )-----------( 375      ( 03 Apr 2025 05:30 )             29.6     PT/INR - ( 02 Apr 2025 06:20 )   PT: 10.8 sec;   INR: 0.91 ratio         PTT - ( 02 Apr 2025 06:20 )  PTT:32.0 sec

## 2025-04-03 NOTE — PROGRESS NOTE ADULT - SUBJECTIVE AND OBJECTIVE BOX
Patient is a 55y old  Male who presents with a chief complaint of left foot gangrene (03 Apr 2025 07:32)       INTERVAL HPI/OVERNIGHT EVENTS:  Patient seen and evaluated at bedside.  Pt is resting comfortable in NAD. Denies N/V/F/C.    Allergies    No Known Allergies    Intolerances        Vital Signs Last 24 Hrs  T(C): 36.8 (03 Apr 2025 05:06), Max: 36.8 (03 Apr 2025 05:06)  T(F): 98.2 (03 Apr 2025 05:06), Max: 98.2 (03 Apr 2025 05:06)  HR: 73 (03 Apr 2025 05:06) (64 - 73)  BP: 158/79 (03 Apr 2025 05:06) (123/76 - 158/79)  BP(mean): 101 (02 Apr 2025 11:15) (89 - 101)  RR: 17 (03 Apr 2025 05:06) (12 - 18)  SpO2: 99% (03 Apr 2025 05:06) (93% - 99%)    Parameters below as of 03 Apr 2025 05:06  Patient On (Oxygen Delivery Method): room air        LABS:                        9.5    10.90 )-----------( 375      ( 03 Apr 2025 05:30 )             29.6     04-03    135  |  102  |  33[H]  ----------------------------<  232[H]  4.1   |  18[L]  |  0.85    Ca    9.2      03 Apr 2025 05:30  Phos  3.1     04-03  Mg     1.90     04-03      PT/INR - ( 02 Apr 2025 06:20 )   PT: 10.8 sec;   INR: 0.91 ratio         PTT - ( 02 Apr 2025 06:20 )  PTT:32.0 sec  Urinalysis Basic - ( 03 Apr 2025 05:30 )    Color: x / Appearance: x / SG: x / pH: x  Gluc: 232 mg/dL / Ketone: x  / Bili: x / Urobili: x   Blood: x / Protein: x / Nitrite: x   Leuk Esterase: x / RBC: x / WBC x   Sq Epi: x / Non Sq Epi: x / Bacteria: x      CAPILLARY BLOOD GLUCOSE      POCT Blood Glucose.: 259 mg/dL (03 Apr 2025 08:43)  POCT Blood Glucose.: 215 mg/dL (03 Apr 2025 02:53)  POCT Blood Glucose.: 213 mg/dL (02 Apr 2025 21:29)  POCT Blood Glucose.: 158 mg/dL (02 Apr 2025 17:13)  POCT Blood Glucose.: 186 mg/dL (02 Apr 2025 11:35)  POCT Blood Glucose.: 219 mg/dL (02 Apr 2025 10:40)      Lower Extremity Physical Exam:  Vascular: DP/PT 0/4, B/L, CFT <3 seconds B/L, Temperature gradient warm to cool, B/L.   Neuro: Epicritic sensation diminished to the level of digits, B/L.  Musculoskeletal/Ortho: unremarkable  Skin: 4/2 s/p left foot partial 2nd ray resection, closed: sutures intact, warm flaps, no hematoma. Right foot posteromedial ankle wound to subQ, no drainage, no fluctuance, no malodor    RADIOLOGY & ADDITIONAL TESTS:

## 2025-04-03 NOTE — PROGRESS NOTE ADULT - TIME BILLING
- Ordering, reviewing, and interpreting labs, testing, and imaging  - Independently obtaining a review of systems and performing a physical exam  - Reviewing consultant recommendations/communicating with consultants  - Counselling and educating patient regarding interpretation of aforementioned items and plan of care    Time spent excludes teaching and separately reported services.

## 2025-04-03 NOTE — PROGRESS NOTE ADULT - PROBLEM SELECTOR PLAN 3
Pt with normocytic anemia (Hgb trending downward slightly since admission from 11.1 to 9.4). Previous hospitalization December 2024 show Hgb around 9.5 (as it is currently). Iron studies from that admission show low iron at 26 with elevated TIBC of 124, normal ferritin of 161, likely consistent with anemia of chronic disease 2/2 to T2DM and associated lower extremity inflammation. Last colonoscopy May 2024, negative; GI bleed less likely etiology.

## 2025-04-03 NOTE — PROGRESS NOTE ADULT - PROBLEM SELECTOR PLAN 1
L foot 2nd digit gangrene. L foot xray showed no gas, no osteomyelitis. Podiatry following; left foot partial 2nd ray resection with Dr. Osuna on Wed 4/2 at 2.   No signs of infection on L foot 2nd toe or R posteromedial ankle wound to subQ, no more leukocytosis, negative MRSA swab, no growth on blood cultures; therefore ok to d/c Abx (cleared with podiatry). Called podiatry to advance surgery date; they are unable to given it is non-emergent.    - s/p L 2nd ray resection w/Podiatry. Pending vascular recs  - s/p IV zosyn + vancomycin (cover for gram+ cocci gram- rods, anaerobes, MRSA)  - f/u INDY/PVR -> no occlusion/significant stenosis  - f/u blood cultures 4/1 -> NG L foot 2nd digit gangrene. L foot xray showed no gas, no osteomyelitis. Podiatry following; left foot partial 2nd ray resection with Dr. Osuna on Wed 4/2.  No signs of infection on L foot 2nd toe or R posteromedial ankle wound to subQ, no more leukocytosis, negative MRSA swab, no growth on blood cultures; therefore ok to d/c Abx (cleared with podiatry).     - s/p L 2nd ray resection w/Podiatry 4/2. podiatry cleared for d/c  - s/p IV zosyn + vancomycin (cover for gram+ cocci gram- rods, anaerobes, MRSA)  - f/u INDY/PVR -> no occlusion/significant stenosis  - f/u blood cultures 4/1 -> NG

## 2025-04-03 NOTE — PROGRESS NOTE ADULT - ASSESSMENT
55M 4/2 s/p left foot partial 2nd ray resection, closed  -Pt was seen and evaluated  -Afebrile, WBC 10.90  -4/2 s/p left foot partial 2nd ray resection, closed: sutures intact, warm flaps, no hematoma. Right foot posteromedial ankle wound to subQ, no drainage, no fluctuance, no malodor  -Intraop findings: low concern for residual infection, high concern for viability  -Left foot 2nd metatarsal clean bone margin pathology: pending  -Pt is stable for discharge from podiatry standpoint pending final vascular recs  -Vascular recs appreciated,   -Discussed with attending

## 2025-04-03 NOTE — PROGRESS NOTE ADULT - PROVIDER SPECIALTY LIST ADULT
Endocrinology
Podiatry
Vascular Surgery
Endocrinology
Internal Medicine
Podiatry
Vascular Surgery
Endocrinology
Podiatry
Podiatry
Internal Medicine
Podiatry
Endocrinology
Internal Medicine

## 2025-04-03 NOTE — DISCHARGE NOTE NURSING/CASE MANAGEMENT/SOCIAL WORK - PATIENT PORTAL LINK FT
You can access the FollowMyHealth Patient Portal offered by [facility] by registering at the following website: [email or web address]. By joining [facility]’s FollowMyHealth portal, you will also be able to view your health information using other applications (apps) compatible with our system.

## 2025-04-03 NOTE — PROGRESS NOTE ADULT - SUBJECTIVE AND OBJECTIVE BOX
Overnight Events/Interval Hx: No acute events overnight. Pt seen and examined at bedside. No complaints of pain.    OBJECTIVE:  ICU Vital Signs Last 24 Hrs  T(C): 36.8 (03 Apr 2025 05:06), Max: 36.8 (03 Apr 2025 05:06)  T(F): 98.2 (03 Apr 2025 05:06), Max: 98.2 (03 Apr 2025 05:06)  HR: 73 (03 Apr 2025 05:06) (69 - 73)  BP: 158/79 (03 Apr 2025 05:06) (137/81 - 158/79)  BP(mean): --  ABP: --  ABP(mean): --  RR: 17 (03 Apr 2025 05:06) (17 - 18)  SpO2: 99% (03 Apr 2025 05:06) (97% - 99%)    O2 Parameters below as of 03 Apr 2025 05:06  Patient On (Oxygen Delivery Method): room air        CAPILLARY BLOOD GLUCOSE      POCT Blood Glucose.: 259 mg/dL (03 Apr 2025 08:43)      PHYSICAL EXAM  GENERAL: NAD, well-developed  HEAD: Atraumatic, Normocephalic  EYES: EOMI, PERRLA, conjunctiva and sclera clear  NECK: Supple, No JVD  CHEST/LUNG: Clear to auscultation bilaterally; No wheezes or crackles  HEART: Normal S1/S2; Regular rate and rhythm; No murmurs, rubs, or gallops  ABDOMEN: Soft, Nontender, Nondistended; Bowel sounds present  EXTREMITIES: L 2nd toe s/p resection  PSYCH: A&Ox3  NEUROLOGY: no focal neurologic deficit  SKIN: No rashes or lesions        HOSPITAL MEDICATIONS:  MEDICATIONS  (STANDING):  atorvastatin 20 milliGRAM(s) Oral at bedtime  chlorhexidine 2% Cloths 1 Application(s) Topical daily  dextrose 5%. 1000 milliLiter(s) (100 mL/Hr) IV Continuous <Continuous>  dextrose 5%. 1000 milliLiter(s) (50 mL/Hr) IV Continuous <Continuous>  dextrose 50% Injectable 25 Gram(s) IV Push once  dextrose 50% Injectable 12.5 Gram(s) IV Push once  dextrose 50% Injectable 25 Gram(s) IV Push once  dextrose Oral Gel 15 Gram(s) Oral once  glucagon  Injectable 1 milliGRAM(s) IntraMuscular once  insulin glargine Injectable (LANTUS) 27 Unit(s) SubCutaneous <User Schedule>  insulin lispro (ADMELOG) corrective regimen sliding scale   SubCutaneous at bedtime  insulin lispro (ADMELOG) corrective regimen sliding scale   SubCutaneous three times a day before meals  insulin lispro Injectable (ADMELOG) 9 Unit(s) SubCutaneous three times a day before meals  mupirocin 2% Ointment 1 Application(s) Topical two times a day    MEDICATIONS  (PRN):  acetaminophen     Tablet .. 650 milliGRAM(s) Oral every 6 hours PRN Temp greater or equal to 38C (100.4F), Mild Pain (1 - 3)  aluminum hydroxide/magnesium hydroxide/simethicone Suspension 30 milliLiter(s) Oral every 4 hours PRN Dyspepsia  HYDROmorphone  Injectable 0.5 milliGRAM(s) IV Push every 4 hours PRN Severe Pain (7 - 10)  melatonin 6 milliGRAM(s) Oral at bedtime PRN Insomnia      LABS:                        9.5    10.90 )-----------( 375      ( 03 Apr 2025 05:30 )             29.6     Hgb Trend: 9.5<--, 9.8<--, 9.5<--, 9.4<--, 9.5<--  04-03    135  |  102  |  33[H]  ----------------------------<  232[H]  4.1   |  18[L]  |  0.85    Ca    9.2      03 Apr 2025 05:30  Phos  3.1     04-03  Mg     1.90     04-03      Creatinine Trend: 0.85<--, 0.76<--, 0.72<--, 0.70<--, 0.67<--, 0.63<--  PT/INR - ( 02 Apr 2025 06:20 )   PT: 10.8 sec;   INR: 0.91 ratio         PTT - ( 02 Apr 2025 06:20 )  PTT:32.0 sec  Urinalysis Basic - ( 03 Apr 2025 05:30 )    Color: x / Appearance: x / SG: x / pH: x  Gluc: 232 mg/dL / Ketone: x  / Bili: x / Urobili: x   Blood: x / Protein: x / Nitrite: x   Leuk Esterase: x / RBC: x / WBC x   Sq Epi: x / Non Sq Epi: x / Bacteria: x            MICROBIOLOGY:       RADIOLOGY:  [ ] Reviewed by me   Overnight Events/Interval Hx: No acute events overnight. Pt seen and examined at bedside. No complaints of pain.    OBJECTIVE:  ICU Vital Signs Last 24 Hrs  T(C): 36.8 (03 Apr 2025 05:06), Max: 36.8 (03 Apr 2025 05:06)  T(F): 98.2 (03 Apr 2025 05:06), Max: 98.2 (03 Apr 2025 05:06)  HR: 73 (03 Apr 2025 05:06) (69 - 73)  BP: 158/79 (03 Apr 2025 05:06) (137/81 - 158/79)  BP(mean): --  ABP: --  ABP(mean): --  RR: 17 (03 Apr 2025 05:06) (17 - 18)  SpO2: 99% (03 Apr 2025 05:06) (97% - 99%)    O2 Parameters below as of 03 Apr 2025 05:06  Patient On (Oxygen Delivery Method): room air        CAPILLARY BLOOD GLUCOSE      POCT Blood Glucose.: 259 mg/dL (03 Apr 2025 08:43)      PHYSICAL EXAM  GENERAL: NAD, well-developed  HEAD: Atraumatic, Normocephalic  EYES: EOMI, PERRLA, conjunctiva and sclera clear  NECK: Supple, No JVD  CHEST/LUNG: Clear to auscultation bilaterally; No wheezes or crackles  HEART: Normal S1/S2; Regular rate and rhythm; No murmurs, rubs, or gallops  ABDOMEN: Soft, Nontender, Nondistended; Bowel sounds present  EXTREMITIES: L foot wrapped s/p 2nd toe resection  PSYCH: A&Ox3  NEUROLOGY: no focal neurologic deficit  SKIN: No rashes or lesions        HOSPITAL MEDICATIONS:  MEDICATIONS  (STANDING):  atorvastatin 20 milliGRAM(s) Oral at bedtime  chlorhexidine 2% Cloths 1 Application(s) Topical daily  dextrose 5%. 1000 milliLiter(s) (100 mL/Hr) IV Continuous <Continuous>  dextrose 5%. 1000 milliLiter(s) (50 mL/Hr) IV Continuous <Continuous>  dextrose 50% Injectable 25 Gram(s) IV Push once  dextrose 50% Injectable 12.5 Gram(s) IV Push once  dextrose 50% Injectable 25 Gram(s) IV Push once  dextrose Oral Gel 15 Gram(s) Oral once  glucagon  Injectable 1 milliGRAM(s) IntraMuscular once  insulin glargine Injectable (LANTUS) 27 Unit(s) SubCutaneous <User Schedule>  insulin lispro (ADMELOG) corrective regimen sliding scale   SubCutaneous at bedtime  insulin lispro (ADMELOG) corrective regimen sliding scale   SubCutaneous three times a day before meals  insulin lispro Injectable (ADMELOG) 9 Unit(s) SubCutaneous three times a day before meals  mupirocin 2% Ointment 1 Application(s) Topical two times a day    MEDICATIONS  (PRN):  acetaminophen     Tablet .. 650 milliGRAM(s) Oral every 6 hours PRN Temp greater or equal to 38C (100.4F), Mild Pain (1 - 3)  aluminum hydroxide/magnesium hydroxide/simethicone Suspension 30 milliLiter(s) Oral every 4 hours PRN Dyspepsia  HYDROmorphone  Injectable 0.5 milliGRAM(s) IV Push every 4 hours PRN Severe Pain (7 - 10)  melatonin 6 milliGRAM(s) Oral at bedtime PRN Insomnia      LABS:                        9.5    10.90 )-----------( 375      ( 03 Apr 2025 05:30 )             29.6     Hgb Trend: 9.5<--, 9.8<--, 9.5<--, 9.4<--, 9.5<--  04-03    135  |  102  |  33[H]  ----------------------------<  232[H]  4.1   |  18[L]  |  0.85    Ca    9.2      03 Apr 2025 05:30  Phos  3.1     04-03  Mg     1.90     04-03      Creatinine Trend: 0.85<--, 0.76<--, 0.72<--, 0.70<--, 0.67<--, 0.63<--  PT/INR - ( 02 Apr 2025 06:20 )   PT: 10.8 sec;   INR: 0.91 ratio         PTT - ( 02 Apr 2025 06:20 )  PTT:32.0 sec  Urinalysis Basic - ( 03 Apr 2025 05:30 )    Color: x / Appearance: x / SG: x / pH: x  Gluc: 232 mg/dL / Ketone: x  / Bili: x / Urobili: x   Blood: x / Protein: x / Nitrite: x   Leuk Esterase: x / RBC: x / WBC x   Sq Epi: x / Non Sq Epi: x / Bacteria: x            MICROBIOLOGY: none new      RADIOLOGY: none new

## 2025-04-03 NOTE — PROGRESS NOTE ADULT - ASSESSMENT
The patient is a 55y Male with PMH of DM who presented to the hospital with LE infection.  Endocrinology consulted for hyperglycemia    #Uncontrolled Type 2 Diabetes Mellitus with hyperglycemia and   #DKA, mild (resolving)  - Follows with: Dr. Hernandez  - A1C with Estimated Average Glucose Result: 16.7 % (03-26-25)  - home regimen: Lantus 12 units (3/29: pt states he want non-adherent with Lantus as he was concerned about the side effect of insulin. Pt educated on importance of skipping medication and DM complications), not compliant with Admelog moderate dose sliding scale; has dexcom G7 at home   - eGFR: 113 mL/min/1.73m2 (03-27-25)  - Weight (kg): 64 (03-27-25)  - glucose elevated, mild DKA on presentation showing improvement      INPATIENT PLAN:  - Inpatient BG goal 100-180mg/dl: FS above goal this morning and this afternoon was 183, but with overall improvement.   - Increase Lantus to 30 units 8am (first dose of increase will be tomorrow 4/4, pt received 27 units this morning).  - Increase Admelog to 10 units TID before meals (HOLD if NPO or not eating)  - Continue low Admelog correctional scale TID AC  - Continue separate low Admelog correctional scale at HS  - Please check FSG before meals and QHS, or q6h while NPO; check 3 am FS   - consistent carb diet when eating  - nutrition consult placed  - Hypoglycemia Protocol   - Educated patient to please wait for FS prior to eating and to avoid eating between meals.  - BHB normal (0.4 on 3/29 @ 12:13). No need to trend BHB unless bicarb or pH is abnormal.       DISCHARGE PLANNING:  - Discharge recs: Lantus 30 units in the morning (was getting 8am here) + admelog 10 units premeal TID.   - will need Endocrinology follow up. Please provide clinic info in DC paperwork for patient to make an appointment: - Patient to call [facility] at [place]:   [address]  [place] [address] [phone]    #Hyperlipidemia  - LDL goal <70  - Last LDL: 132 mg/dL (12-16-24)  - check lipid panel   -c/w lipitor 20 mg daily   -lipid panel yearly outpt  - defer management to primary team    d/w primary team Dr. Trisha Mirza, [facility]-BC  Nurse Practitioner  Division of Endocrinology & Diabetes  Contact on TEAMS    If out of hospital/unavailable when paged, please note: patient will be cared for by another provider on the endocrine service.  For urgent concerns: call the endocrine answering service for assistance to reach covering provider ([phone]). For non-urgent matters: please email [email or web address] for assistance.

## 2025-04-03 NOTE — PROGRESS NOTE ADULT - REASON FOR ADMISSION
left foot gangrene

## 2025-04-03 NOTE — PROGRESS NOTE ADULT - PROBLEM SELECTOR PLAN 2
Initially elevated glucoses and DKA, BHB NOW RESOLVED; persistent BHB elevation, received 2 liters LR on 3/28 with resolution of BHB    - IVF as needed for BHB, pt tolerating PO  - 3/30 additional 500cc (100/hr x5h)  - Endocrine following -> lantus 27, premeal 9 and ISS Initially elevated glucoses and DKA, BHB NOW RESOLVED; persistent BHB elevation, received 2 liters LR on 3/28 with resolution of BHB.    - pt hyperglycemic to 259 with anion gap 15 and bicarb 18; order VBG and BHB to ensure no mild DKA prior to d/c  - IVF as needed for BHB, pt tolerating PO  - 3/30 additional 500cc (100/hr x5h)  - Endocrine following -> lantus 27, premeal 9 and ISS Initially elevated glucoses and DKA, BHB NOW RESOLVED; persistent BHB elevation, received 2 liters LR on 3/28 with resolution of BHB.    - pt hyperglycemic to 259 with anion gap 15 and bicarb 18; order VBG and BHB to ensure no mild DKA prior to d/c  - IVF as needed for BHB, pt tolerating PO  - 3/30 additional 500cc (100/hr x5h)  - Endocrine following -> d/c recs are lantus 30 at 8am, premeal 10. provide endocrinology clinic info for outpt follow up

## 2025-04-03 NOTE — PHYSICAL THERAPY INITIAL EVALUATION ADULT - MANUAL MUSCLE TESTING RESULTS, REHAB EVAL
Bilateral UE muscle strength grossly at least 4+/5 Throughout; Bilateral LE muscle strength grossly at least 4+/5 throughout.

## 2025-04-03 NOTE — PROGRESS NOTE ADULT - PROBLEM SELECTOR PROBLEM 1
Gangrene of lower extremity
Gangrene of lower extremity
DKA (diabetic ketoacidosis)
Gangrene of lower extremity
DKA (diabetic ketoacidosis)
Gangrene of lower extremity
DKA (diabetic ketoacidosis)
DKA (diabetic ketoacidosis)
Ketosis due to diabetes
Gangrene of lower extremity
Gangrene of lower extremity

## 2025-04-03 NOTE — PROGRESS NOTE ADULT - PROBLEM SELECTOR PLAN 5
- DVT ppx: sq heparin  Dispo: Pending Vascular and Podiatry recs - DVT ppx: sq heparin  Dispo: vascular and podiatry have cleared for d/c. pending final endocrine recs and social work housing coordination. - DVT ppx: sq heparin  Dispo: vascular, podiatry, and endocrine have cleared for d/c with final recs. pending social work housing coordination and VBG, BHB labs.

## 2025-04-03 NOTE — PROGRESS NOTE ADULT - ATTENDING COMMENTS
s/p partial left toe amputation   reportedly with poor intra-operative bleeding  PVRs re-reviewed with toe pressure of 79mmHg and adequate digital waveforms no indication at this time for angiogram or revascularization consideration  should toe amputation not heal may need more proximal toe amputation, would also consider vasospasm as reason for poor bleeding intra-op  will monitor closely
55M with PMH of DM2 (basal/bolus insulin), gastric bypass sent in by podiatry for evaluation of dry gangrene. On arrival - pt noted to have markedly elevated glucose w/ anion gap acidosis + elevated BHB. Pt was hydrated and given SQ insulin. Gap narrowed and glycemic control improved. CXR and XR of foot unrevealing for any acute infectious process. Pt admitted for further evaluation.    No new complaints.    Discussed DM diet with patient    #DM2 w/ hyperglycemia. DKA resolved  #Diabetic foot infection, dry gangrene of left foot  #SANJIV    Endo on board, recs appreciated.  Will continue basal/bolus regimen  FS remains uncontrolled, will follow Endo recs.    A1c 16., DM education.    Continue IV vanco/zosyn. De-escalate pending cx data. F/u MRSA PCR.  Vascular on board - no intervention  Podiatry planning for 2nd partial ray resection on 4/2 .
55M with PMH of DM2 (basal/bolus insulin), gastric bypass sent in by podiatry for evaluation of dry gangrene. On arrival - pt noted to have markedly elevated glucose w/ anion gap acidosis + elevated BHB. Pt was hydrated and given SQ insulin. Gap narrowed and glycemic control improved. CXR and XR of foot unrevealing for any acute infectious process. Pt admitted for further evaluation.    No new complaints.  FS remains elevated  Patient denies eating outside food however nurse notes that at times he may ask for extra meals    #DM2 w/ hyperglycemia. DKA  #Diabetic foot infection, dry gangrene of left foot  #SANJIV Jarrett on board, recs appreciated.  Will continue basal/bolus regimen    Lantus up to 20 now.  Added lipitor  AG open again.  Will give another 2L IVF  A1c 16., DM education.    Continue IV vanco/zosyn. De-escalate pending cx data. F/u MRSA PCR.  Vascular on board - no intervention  Podiatry planning for 2nd partial ray resection on 4/2 .
55M with PMH of DM2 (basal/bolus insulin), gastric bypass sent in by podiatry for evaluation of dry gangrene. On arrival - pt noted to have markedly elevated glucose w/ anion gap acidosis + elevated BHB. Pt was hydrated and given SQ insulin. Gap narrowed and glycemic control improved. CXR and XR of foot unrevealing for any acute infectious process. Pt admitted for further evaluation.    No new complaints.      #DM2 w/ hyperglycemia. DKA  #Diabetic foot infection, dry gangrene of left foot  #SANJIV Jarrett on board, recs appreciated.  Will continue basal/bolus regimen  AG still elevated, will give 2L IVF  A1c 16., DM education.    Continue IV vanco/zosyn. De-escalate pending cx data. F/u MRSA PCR.  Vascular on board - no intervention  Podiatry planning for 2nd partial ray resection on 4/3
Mr. Reyes is a 56 yo M w/ PMH of Type 2 Diabetes Mellitus, poorly controlled (Hemoglobin A1c 16.7%) and h/o gastric bypass presents with progressive left 2nd toe gangrene and severe hyperglycemia. Under podiatric care since December 2024, noted rapid progression of atraumatic toe lesion over 2 weeks and new ankle swelling. Presented with DKA (glucose 720, pH 7.31, BHB 5) and leukocytosis (14.25). S/p left foot 2nd ray resection yesterday. Planned for d/c today.     # Left foot gangrene, s/p left 2nd ray resection 4/2/25  - remains afebrile, no leukocytosis since being off abx  - podiatry concerned for viability; vascular re-reviewed PVRs - no indication for angiogram or revascularization - needs to f/u w/ podiatry and vascular surgery as outpt  - wound care    # Diabetic Ketoacidosis with severe hyperglycemia  - DKA resolved but still with uncontrolled hyperglycemia; had elevated AG today w/ mild elevation of BHB but pH ok  - Endo f/u appreciated - ok for d/c home with lantus 30 units subcutaneous daily in AM (was getting it in morning while in hospital) and admelog 10 units TID AC  - f/u w/ PCP and endo post-discharge    # Disposition: d/c home today; d/w SW/CM/NM during IDRs.    Discussed with patient at bedside.
Mr. Reyes is a 56 yo M w/ PMH of Type 2 Diabetes Mellitus, poorly controlled (Hemoglobin A1c 16.7%) and h/o gastric bypass presents with progressive left 2nd toe gangrene and severe hyperglycemia. Under podiatric care since December 2024, noted rapid progression of atraumatic toe lesion over 2 weeks and new ankle swelling. Presented with DKA (glucose 720, pH 7.31, BHB 5) and leukocytosis (14.25). Home insulin regimen: Lantus 12 units nightly, Admelog 7 units before meals.    No overnight events. Pt feels well. Denies any chest pain, SOB, fever, chills.     Assessment/Plan:    # Left foot gangrene  - Clinical status: - Afebrile, WBC normalized to 7.91 - Inflammatory markers: ESR 81, CRP 11.80   - XR foot: no OM/gas  - Blood cultures negative  - Podiatry evaluation – no fluctuance, drainage, malodor, no cultures taken as no signs of infection; can d/c vancomycin/Zosyn   - INDY/PVR: RABI 1.32/RTBI 0.73, LABI 1.18/LTBI 0.44 - no significant stenosis/occlusion   - Vascular surgery cleared for procedure  - Surgical plan: Left foot partial 2nd ray resection scheduled for 4/2 at 2PM with Dr. Osuna    # Diabetic Ketoacidosis with severe hyperglycemia  - DKA now resolved but still with uncontrolled hyperglycemia  - BHB normalized (0.4 on 3/29)   - Lantus increased to 23 units at 8AM; Continue Admelog 7 units TID AC; Low-dose correctional scale TID AC and HS   - FSG checks AC/HS, q6h if NPO, plus 3AM   - Nutrition consult; Diabetes education needed  - Endocrinology f/u appreciated    # Disposition: Planned for OR 4/2
Mr. Reyes is a 56 yo M w/ PMH of Type 2 Diabetes Mellitus, poorly controlled (Hemoglobin A1c 16.7%) and h/o gastric bypass presents with progressive left 2nd toe gangrene and severe hyperglycemia. Under podiatric care since December 2024, noted rapid progression of atraumatic toe lesion over 2 weeks and new ankle swelling. Presented with DKA (glucose 720, pH 7.31, BHB 5) and leukocytosis (14.25).     No overnight events. Pt feels well. No chest pain, SOB, fever, chills. Aware of surgical plans for tomorrow.     # Left foot gangrene  - Clinical status: remains afebrile, no leukocytosis  - XR foot: no OM/gas  - Blood cultures negative  - Podiatry evaluation – no fluctuance, drainage, malodor, no cultures taken as no signs of infection; can d/c vancomycin/Zosyn   - INDY/PVR: no significant stenosis/occlusion   - Vascular surgery cleared for procedure  - Surgical plan: Left foot partial 2nd ray resection scheduled for tomorrow 4/2 at 2PM   - agree w/ risk stratification as above; may proceed to OR without further testing    # Diabetic Ketoacidosis with severe hyperglycemia  - DKA now resolved but still with uncontrolled hyperglycemia  - BHB normalized  - Endo f/u appreciated - give Lantus 25 units at 6am tomorrow since pt will be NPO after MN for surgery tomorrow; admelog 9 units TID AC but hold while NPO; c/w low-dose correctional scale  - FSG checks AC/HS, q6h if NPO, plus 3AM   - Nutrition consult; Diabetes education needed    # Disposition: d/c planning after routine post-op monitoring; d/w SW/CM/NM during IDRs
Mr. Reyes is a 56 yo M w/ PMH of Type 2 Diabetes Mellitus, poorly controlled (Hemoglobin A1c 16.7%) and h/o gastric bypass presents with progressive left 2nd toe gangrene and severe hyperglycemia. Under podiatric care since December 2024, noted rapid progression of atraumatic toe lesion over 2 weeks and new ankle swelling. Presented with DKA (glucose 720, pH 7.31, BHB 5) and leukocytosis (14.25). S/p left foot 2nd ray resection today.    # Left foot gangrene  - s/p left 2nd ray resection today, podiatry concerned for viability; vascular f/u  - Clinical status: remains afebrile, no leukocytosis since being off abx      # Diabetic Ketoacidosis with severe hyperglycemia  - DKA now resolved but still with uncontrolled hyperglycemia  - BHB normalized  - Endo f/u appreciated - increase Lantus to 27units; admelog 9 units TID AC; c/w admelog correctional scale  - monitoring FSG checks AC/HS  - Nutrition consult; Diabetes education needed    # Disposition: d/c planning; PT eval; d/w SW/CM/NM during IDRs.

## 2025-04-03 NOTE — PHYSICAL THERAPY INITIAL EVALUATION ADULT - GENERAL OBSERVATIONS, REHAB EVAL
Consult received, chart reviewed. Patient received in bed semi-supine, no apparent distress, HR 73. Pt. agreeable to participate in PT.

## 2025-04-03 NOTE — DISCHARGE NOTE NURSING/CASE MANAGEMENT/SOCIAL WORK - NSDCFUADDAPPT_GEN_ALL_CORE_FT
Podiatry Discharge Instructions:  Follow up: Please follow up with Dr. Osuna within 1 week of discharge from the hospital, please call [phone] for appointment and discuss that you recently were seen in the hospital.  Wound Care: Please leave your dressing clean dry intact until your follow up appointment   Weight bearing: Please weight bear as tolerated to L heel in a surgical shoe.

## 2025-04-03 NOTE — PHYSICAL THERAPY INITIAL EVALUATION ADULT - NSPTDISCHREC_GEN_A_CORE
outpatient PT services to address current functional limitations to optimize safety with gait and balance/Outpatient PT

## 2025-04-03 NOTE — PHYSICAL THERAPY INITIAL EVALUATION ADULT - ADDITIONAL COMMENTS
Pt. reports he lives in a house with aunt that has 5 steps to enter.  Prior to admission Pt was independent with all mobility and ambulated without an assistive device. Pt denies any recent falls.     Pt. left comfortable sitting edge of bed post PT Evaluation, no apparent distress, call bell in reach, all lines intact, HR 95 bpm. RN made aware of pt. status and participation in PT.

## 2025-04-03 NOTE — PROGRESS NOTE ADULT - SUBJECTIVE AND OBJECTIVE BOX
Chief Complaint: type 2 DM    History: Tolerating PO diet. No hypoglycemia. FS above goal this morning and this afternoon was 183.    MEDICATIONS  (STANDING):  atorvastatin 20 milliGRAM(s) Oral at bedtime  chlorhexidine 2% Cloths 1 Application(s) Topical daily  dextrose 5%. 1000 milliLiter(s) (100 mL/Hr) IV Continuous <Continuous>  dextrose 5%. 1000 milliLiter(s) (50 mL/Hr) IV Continuous <Continuous>  dextrose 50% Injectable 25 Gram(s) IV Push once  dextrose 50% Injectable 12.5 Gram(s) IV Push once  dextrose 50% Injectable 25 Gram(s) IV Push once  dextrose Oral Gel 15 Gram(s) Oral once  glucagon  Injectable 1 milliGRAM(s) IntraMuscular once  insulin lispro (ADMELOG) corrective regimen sliding scale   SubCutaneous at bedtime  insulin lispro (ADMELOG) corrective regimen sliding scale   SubCutaneous three times a day before meals  insulin lispro Injectable (ADMELOG) 10 Unit(s) SubCutaneous three times a day before meals  mupirocin 2% Ointment 1 Application(s) Topical two times a day    MEDICATIONS  (PRN):  acetaminophen     Tablet .. 650 milliGRAM(s) Oral every 6 hours PRN Temp greater or equal to 38C (100.4F), Mild Pain (1 - 3)  aluminum hydroxide/magnesium hydroxide/simethicone Suspension 30 milliLiter(s) Oral every 4 hours PRN Dyspepsia  HYDROmorphone  Injectable 0.5 milliGRAM(s) IV Push every 4 hours PRN Severe Pain (7 - 10)  melatonin 6 milliGRAM(s) Oral at bedtime PRN Insomnia      Allergies    No Known Allergies    Intolerances      Review of Systems:  Cardiovascular: No chest pain, palpitations  Respiratory: No SOB, no cough  GI: No nausea, vomiting, abdominal pain  : No dysuria  Endocrine: no polyuria, polydipsia      PHYSICAL EXAM:  VITALS: T(C): 36.8 (04-03-25 @ 05:06)  T(F): 98.2 (04-03-25 @ 05:06), Max: 98.2 (04-03-25 @ 05:06)  HR: 73 (04-03-25 @ 05:06) (69 - 73)  BP: 158/79 (04-03-25 @ 05:06) (137/81 - 158/79)  RR:  (17 - 18)  SpO2:  (97% - 99%)  Wt(kg): --  GENERAL: NAD, well-groomed, well-developed  EYES: No proptosis  HEENT:  Atraumatic, Normocephalic  RESPIRATORY: non labored breathing   CARDIOVASCULAR: Regular rate and rhythm  GI: Soft, nontender, non distended  PSYCH: Alert and oriented x 3, normal affect, normal mood     CAPILLARY BLOOD GLUCOSE      POCT Blood Glucose.: 183 mg/dL (03 Apr 2025 12:12)  POCT Blood Glucose.: 259 mg/dL (03 Apr 2025 08:43)  POCT Blood Glucose.: 215 mg/dL (03 Apr 2025 02:53)  POCT Blood Glucose.: 213 mg/dL (02 Apr 2025 21:29)  POCT Blood Glucose.: 158 mg/dL (02 Apr 2025 17:13)      04-03    135  |  102  |  33[H]  ----------------------------<  232[H]  4.1   |  18[L]  |  0.85    eGFR: 104    Ca    9.2      04-03  Mg     1.90     04-03  Phos  3.1     04-03    TPro  6.6  /  Alb  3.0[L]  /  TBili  <0.2  /  DBili  x   /  AST  12  /  ALT  10  /  AlkPhos  102  04-01          Thyroid Function Tests:        A1C with Estimated Average Glucose Result: 16.7 % (03-26-25 @ 22:23)  A1C with Estimated Average Glucose Result: 12.5 % (12-14-24 @ 00:48)          Diet, Consistent Carbohydrate/No Snacks (03-27-25 @ 15:44)

## 2025-04-03 NOTE — PHYSICAL THERAPY INITIAL EVALUATION ADULT - PERTINENT HX OF CURRENT PROBLEM, REHAB EVAL
54 y/o Male sent in by podiatrist Dr. 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 Admitted for management of L foot dry gangrene, found to have mild DKA resolved status post  ademlog 10u and initiation of inpatient insulin regimen, status post  vanc and zosyn. status post  left foot second ray resection w/podiatry. Pending vascular recs.

## 2025-04-03 NOTE — PROGRESS NOTE ADULT - ASSESSMENT
55M PMH of DM2 (home med lantus 12 units qhs, last HgA1c 12 in 12/2024), gastric bypass, sent in by podiatrist Dr. Fernandez for progressively worsening left foot 2nd toe dry gangrene for admission without concerns for OM at this time. Vascular surgery consulted for evaluation. Denies claudications. Reports of multiple episodes of LE wounds, but healed on itself in the past. s/p L toe amputation on 4/2/25. Vascular surgery reconsulted due to c/f inadequate intraoperative bleeding.     Recommendations:  - INDY/PVRs w/ toe pressure reviewed - R 115 and L 79 with good waveform. no acute vascular surgery interventions at the moment given INDY/PVR toe pressure results  - continue local wound care and medical management  - monitor wound healing  - endocrine consult for diabetes management   - appreciate care per primary team     Plan discussed with fellow on behalf of attending     Surgery C Team  Please page the team for any patient related questions or concerns. Pager number : [phone]

## 2025-04-03 NOTE — DISCHARGE NOTE NURSING/CASE MANAGEMENT/SOCIAL WORK - FINANCIAL ASSISTANCE
[facility] provides services at a reduced cost to those who are determined to be eligible through [facility]’s financial assistance program. Information regarding [facility]’s financial assistance program can be found by going to [email or web address] or by calling [phone].

## 2025-04-03 NOTE — PROGRESS NOTE ADULT - ASSESSMENT
56 y/o M with PMH of DM2, sent in by podiatrist Dr. 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 Admitted for management of L foot dry gangrene / evaluation for possible amputation, found to have mild DKA resolved s/p ademlog 10u and initiation of inpatient insulin regimen, s/p vanc and zosyn. s/p left foot second ray resection w/podiatry. Pending vascular recs.     54 y/o M with PMH of DM2, sent in by podiatrist Dr. 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 Admitted for management of L foot dry gangrene / evaluation for possible amputation, found to have mild DKA resolved s/p ademlog 10u and initiation of inpatient insulin regimen, s/p vanc and zosyn. s/p left foot second ray resection w/podiatry.

## 2025-04-04 PROCEDURE — 73630 X-RAY EXAM OF FOOT: CPT | Mod: 26,LT

## 2025-04-09 ENCOUNTER — APPOINTMENT (OUTPATIENT)
Dept: INTERNAL MEDICINE | Facility: CLINIC | Age: 55
End: 2025-04-09

## 2025-04-09 LAB — SURGICAL PATHOLOGY STUDY: SIGNIFICANT CHANGE UP

## 2025-09-02 ENCOUNTER — APPOINTMENT (OUTPATIENT)
Dept: ENDOCRINOLOGY | Facility: CLINIC | Age: 55
End: 2025-09-02

## (undated) DEVICE — BLADE SURGICAL #15 CARBON

## (undated) DEVICE — DRSG XEROFORM 1 X 8"

## (undated) DEVICE — DRAPE SURGICAL #1010

## (undated) DEVICE — LABELS BLANK W PEN

## (undated) DEVICE — WARMING BLANKET FULL ADULT

## (undated) DEVICE — PACK LIJ BASIC ORTHO

## (undated) DEVICE — CANISTER DISPOSABLE THIN WALL 3000CC

## (undated) DEVICE — PREP CHLORAPREP HI-LITE ORANGE 26ML

## (undated) DEVICE — FRAZIER SUCTION TIP 8FR

## (undated) DEVICE — DRSG STOCKINETTE IMPERVIOUS XL 12 X 48"

## (undated) DEVICE — TOURNIQUET CUFF 18" DUAL PORT DUAL BLADDER W PLC (BLACK)

## (undated) DEVICE — DRSG ACE BANDAGE 4" NS

## (undated) DEVICE — SYR LUER LOK 10CC

## (undated) DEVICE — SOL IRR BAG NS 0.9% 3000ML

## (undated) DEVICE — ANESTHESIA CIRCUIT ADULT HMEF

## (undated) DEVICE — DRAPE TOWEL BLUE 17" X 24"

## (undated) DEVICE — VENODYNE/SCD SLEEVE CALF MEDIUM

## (undated) DEVICE — TOURNIQUET ESMARK 6"

## (undated) DEVICE — DRSG STOCKINETTE TUBULAR 6"

## (undated) DEVICE — NDL HYPO REGULAR BEVEL 25G X 1.5" (BLUE)

## (undated) DEVICE — ELCTR GROUNDING PAD ADULT COVIDIEN

## (undated) DEVICE — DRSG KLING 4"